# Patient Record
Sex: MALE | Race: WHITE | Employment: OTHER | URBAN - METROPOLITAN AREA
[De-identification: names, ages, dates, MRNs, and addresses within clinical notes are randomized per-mention and may not be internally consistent; named-entity substitution may affect disease eponyms.]

---

## 2017-02-06 NOTE — TELEPHONE ENCOUNTER
LOV 7/8/16. LOV for anxiety was 7/13/15. No future appointments. Refill request for Citalopram.Depression/anxiety have not been addressed since 7/2015. Routed to Dr Kevin Romeo.

## 2017-03-01 ENCOUNTER — TELEPHONE (OUTPATIENT)
Dept: FAMILY MEDICINE CLINIC | Facility: CLINIC | Age: 57
End: 2017-03-01

## 2017-03-01 NOTE — TELEPHONE ENCOUNTER
Pt had a scheduled appt for 3/2/17 w/Dr Roney Odell. ..he thought it was for today 3/1/17 at 8:45. I did offer other times for him but he DECLINED. Pt was upset and stated that he will probably go to another office. Appt was cancelled per pt

## 2017-03-01 NOTE — TELEPHONE ENCOUNTER
LOV 7/8/16 was acute. LOV for depression was 7/13/15. No future appointments. Pt states that he received an automated call yesterday stating that he had appointment today. When he came for appt, he was told that his appt is actually tomorrow.  Pt was offe

## 2017-03-07 NOTE — PROGRESS NOTES
Milly Torres is a 64year old male. Patient presents with:  Establish Care: per pt   Medication Request      HPI:   Pt is switching offices, as this is much closer and easier to get to for him. Needs a refill of citalopram. Takes 40 mg per day.  Has b Yes           0.0 oz/week       0 Standard drinks or equivalent per week       Comment: special occ - weddings        BP Readings from Last 6 Encounters:  03/07/17 : 110/68  07/08/16 : 128/76  06/30/16 : 138/87  02/02/16 : 120/80  12/30/15 : 132/78  08/05/ Take 1 tablet (40 mg total) by mouth daily. The patient indicates understanding of these issues and agrees to the plan.

## 2017-05-11 PROCEDURE — 80053 COMPREHEN METABOLIC PANEL: CPT | Performed by: FAMILY MEDICINE

## 2017-05-11 PROCEDURE — 80061 LIPID PANEL: CPT | Performed by: FAMILY MEDICINE

## 2017-05-11 PROCEDURE — 83036 HEMOGLOBIN GLYCOSYLATED A1C: CPT | Performed by: FAMILY MEDICINE

## 2017-05-11 NOTE — PROGRESS NOTES
Helen Yang is a 64year old male who presents for a complete physical exam.   HPI:   Pt complains of nothing today, other than achy joints from arthritis. This gets worse with the weather, especially rain.      Depression: on citalopram. He is doing ok bronchitis    • Cellulitis    • Conjunctivitis    • Tinea pedis    • Vertigo    • Preoperative examination, unspecified    • Pre-operative cardiovascular examination    • Visual impairment      glassses   • Bowel obstruction [facility]) March 2015   • Depression arthritis as above.    NEURO: denies headaches that are new or different, numbness all over from previous spinal injury, not getting worse or changing   PSYCHE: + depression as above, no anxiety, no SI or HI.   HEMATOLOGIC: denies hx of anemia  ENDOCRINE: d Orders Placed This Encounter  Lipid Panel [E]  Comp Metabolic Panel (14) [E]  Hemoglobin A1C [E]  *Venipuncture    Meds & Refills for this Visit:  Signed Prescriptions Disp Refills    Citalopram Hydrobromide 40 MG Oral Tab 90 tablet 1      Sig: Take 1

## 2017-05-15 ENCOUNTER — TELEPHONE (OUTPATIENT)
Dept: FAMILY MEDICINE CLINIC | Facility: CLINIC | Age: 57
End: 2017-05-15

## 2017-05-15 NOTE — TELEPHONE ENCOUNTER
Yes, we took care of everything. You can cancel the physical. He needs to schedule for his colonoscopy consult with Dr. Ulysees Kayser, however (just a reminder). Thanks.

## 2017-06-14 ENCOUNTER — OFFICE VISIT (OUTPATIENT)
Dept: FAMILY MEDICINE CLINIC | Facility: CLINIC | Age: 57
End: 2017-06-14

## 2017-06-14 VITALS
RESPIRATION RATE: 18 BRPM | DIASTOLIC BLOOD PRESSURE: 60 MMHG | SYSTOLIC BLOOD PRESSURE: 132 MMHG | HEART RATE: 78 BPM | OXYGEN SATURATION: 98 % | TEMPERATURE: 99 F

## 2017-06-14 DIAGNOSIS — J02.9 PHARYNGITIS, UNSPECIFIED ETIOLOGY: Primary | ICD-10-CM

## 2017-06-14 DIAGNOSIS — J06.9 UPPER RESPIRATORY INFECTION WITH COUGH AND CONGESTION: ICD-10-CM

## 2017-06-14 PROCEDURE — 87880 STREP A ASSAY W/OPTIC: CPT | Performed by: NURSE PRACTITIONER

## 2017-06-14 PROCEDURE — 87081 CULTURE SCREEN ONLY: CPT | Performed by: NURSE PRACTITIONER

## 2017-06-14 PROCEDURE — 99213 OFFICE O/P EST LOW 20 MIN: CPT | Performed by: NURSE PRACTITIONER

## 2017-06-14 NOTE — PROGRESS NOTES
CHIEF COMPLAINT:   Patient presents with:  Nasal Congestion  Sore Throat  Fever        HPI:   Malina Nielsen is a 64year old male presents to clinic with complaint of sore throat. Patient has had for 2 days. Symptoms have been contsant since onset.   Pa NOSE: nostrils patent, clear nasal discharge, nasal mucosa mildly erythematous  THROAT: oral mucosa pink, moist. Posterior pharynx erythematous and injected. No exudates. Tonsils 0/4.   Breath is not malodorous   NECK: supple  LUNGS: clear to auscultation b 6. Follow up with PMD in 3-4 days for reeval. Follow up sooner or go to the emergency department immediately if symptoms worsen, change, or if you have any concerns.       Self-Care for Sore Throats  Sore throats happen for many reasons, such as colds, mika · When you’re around someone with a sore throat or cold, wash your hands often to keep viruses or bacteria from spreading. · Don’t strain your vocal cords.   Call your healthcare provider  Contact your healthcare provider if you have:  · A temperature over · Gargle every 2 hours with 1/4 teaspoon of salt dissolved in 1/2 cup of warm water. Suck on throat lozenges and cough drops to moisten your throat. · Cough medicines are available but it is unclear how effective they actually are.   · Take acetaminophen o

## 2017-06-26 ENCOUNTER — HOSPITAL ENCOUNTER (OUTPATIENT)
Dept: GENERAL RADIOLOGY | Age: 57
Discharge: HOME | End: 2017-06-26
Attending: FAMILY MEDICINE
Payer: COMMERCIAL

## 2017-06-26 ENCOUNTER — OFFICE VISIT (OUTPATIENT)
Dept: FAMILY MEDICINE CLINIC | Facility: CLINIC | Age: 57
End: 2017-06-26

## 2017-06-26 VITALS
TEMPERATURE: 97 F | RESPIRATION RATE: 16 BRPM | SYSTOLIC BLOOD PRESSURE: 122 MMHG | HEIGHT: 70 IN | DIASTOLIC BLOOD PRESSURE: 78 MMHG | WEIGHT: 227.63 LBS | HEART RATE: 76 BPM | BODY MASS INDEX: 32.59 KG/M2

## 2017-06-26 DIAGNOSIS — M17.11 ARTHRITIS OF RIGHT KNEE: ICD-10-CM

## 2017-06-26 DIAGNOSIS — M25.561 ACUTE PAIN OF RIGHT KNEE: Primary | ICD-10-CM

## 2017-06-26 DIAGNOSIS — M25.561 ACUTE PAIN OF RIGHT KNEE: ICD-10-CM

## 2017-06-26 PROCEDURE — 99214 OFFICE O/P EST MOD 30 MIN: CPT | Performed by: FAMILY MEDICINE

## 2017-06-26 PROCEDURE — 73562 X-RAY EXAM OF KNEE 3: CPT | Performed by: FAMILY MEDICINE

## 2017-06-26 RX ORDER — MELOXICAM 15 MG/1
15 TABLET ORAL DAILY
Qty: 30 TABLET | Refills: 0 | Status: SHIPPED | OUTPATIENT
Start: 2017-06-26 | End: 2017-07-23

## 2017-06-26 NOTE — PROGRESS NOTES
Sandra Ortiz is a 64year old male.     CC:  Patient presents with:  Musculoskeletal Problem: per pt, right knee issues      HPI:  The patient has noted musculoskeletal pain:  Location: right  knee  Duration: 2 week(s)  Injury: none recently, he did ha UNLISTED      Comment: cervical vertebral fusion  1999: VASECTOMY   Family History   Problem Relation Age of Onset   • Cancer Mother      brain   • Cancer Paternal Grandfather      brain   • Other[other] [OTHER] Son      POTS, cyclical vomiting    • Psychi knee exam:  Full ROM  No effusion or tenderness. NEURO: not examined     ASSESSMENT AND PLAN    1. Acute pain of right knee  Await xray  Start Mobic  - XR KNEE (1 OR 2 VIEWS), RIGHT (CPT=73560); Future    2.  Arthritis of right knee  As above  - XR KNEE (

## 2017-07-24 RX ORDER — MELOXICAM 15 MG/1
TABLET ORAL
Qty: 30 TABLET | Refills: 0 | Status: SHIPPED | OUTPATIENT
Start: 2017-07-24 | End: 2017-08-27

## 2017-08-28 RX ORDER — MELOXICAM 15 MG/1
TABLET ORAL
Qty: 30 TABLET | Refills: 0 | Status: SHIPPED | OUTPATIENT
Start: 2017-08-28 | End: 2017-09-26

## 2017-09-26 RX ORDER — MELOXICAM 15 MG/1
TABLET ORAL
Qty: 30 TABLET | Refills: 0 | Status: SHIPPED | OUTPATIENT
Start: 2017-09-26 | End: 2017-10-28

## 2017-09-26 NOTE — TELEPHONE ENCOUNTER
Script sent. I see he had a knee injection today, if pain gets better after that, he should stop the mobic.

## 2017-10-20 NOTE — TELEPHONE ENCOUNTER
Patient states the injection helped with the swelling but did not help with the pain.   Has f/u with orth 10/24/17

## 2017-10-28 RX ORDER — MELOXICAM 15 MG/1
TABLET ORAL
Qty: 30 TABLET | Refills: 0 | Status: SHIPPED | OUTPATIENT
Start: 2017-10-28 | End: 2017-11-29

## 2017-10-28 NOTE — TELEPHONE ENCOUNTER
Script sent, but if this is not helping his knee pain, then he should not keep taking it as it will cause other issues with stomach, kidneys and increase risk of heart disease. Looks like he is seeing Dr. Leila Broderick for knee and has MRI pending.

## 2017-10-28 NOTE — TELEPHONE ENCOUNTER
Patient states medication does not help with his knee pain but it does help with he general arthritis pain.   Patient states he thought he was taking meloxicam because it is better for him than aleve and would not cause problems with stomach,kidney or incre

## 2017-11-07 ENCOUNTER — TELEPHONE (OUTPATIENT)
Dept: FAMILY MEDICINE CLINIC | Facility: CLINIC | Age: 57
End: 2017-11-07

## 2017-11-07 ENCOUNTER — OFFICE VISIT (OUTPATIENT)
Dept: FAMILY MEDICINE CLINIC | Facility: CLINIC | Age: 57
End: 2017-11-07

## 2017-11-07 VITALS
BODY MASS INDEX: 32.73 KG/M2 | HEART RATE: 70 BPM | WEIGHT: 228.63 LBS | DIASTOLIC BLOOD PRESSURE: 70 MMHG | TEMPERATURE: 98 F | RESPIRATION RATE: 14 BRPM | SYSTOLIC BLOOD PRESSURE: 120 MMHG | HEIGHT: 70 IN

## 2017-11-07 DIAGNOSIS — K08.9 POOR DENTITION: ICD-10-CM

## 2017-11-07 DIAGNOSIS — F32.1 MODERATE SINGLE CURRENT EPISODE OF MAJOR DEPRESSIVE DISORDER (HCC): ICD-10-CM

## 2017-11-07 DIAGNOSIS — G47.63 BRUXISM, SLEEP-RELATED: ICD-10-CM

## 2017-11-07 DIAGNOSIS — Z82.61 FAMILY HISTORY OF RHEUMATOID ARTHRITIS: ICD-10-CM

## 2017-11-07 DIAGNOSIS — M20.002 ACQUIRED BILATERAL FINGER DEFORMITY: Primary | ICD-10-CM

## 2017-11-07 DIAGNOSIS — M20.001 ACQUIRED BILATERAL FINGER DEFORMITY: Primary | ICD-10-CM

## 2017-11-07 DIAGNOSIS — R25.1 TREMOR OF BOTH HANDS: ICD-10-CM

## 2017-11-07 PROCEDURE — 99214 OFFICE O/P EST MOD 30 MIN: CPT | Performed by: FAMILY MEDICINE

## 2017-11-07 PROCEDURE — 84443 ASSAY THYROID STIM HORMONE: CPT | Performed by: FAMILY MEDICINE

## 2017-11-07 PROCEDURE — 86431 RHEUMATOID FACTOR QUANT: CPT | Performed by: FAMILY MEDICINE

## 2017-11-07 PROCEDURE — 82607 VITAMIN B-12: CPT | Performed by: FAMILY MEDICINE

## 2017-11-07 PROCEDURE — 86140 C-REACTIVE PROTEIN: CPT | Performed by: FAMILY MEDICINE

## 2017-11-07 PROCEDURE — 84550 ASSAY OF BLOOD/URIC ACID: CPT | Performed by: FAMILY MEDICINE

## 2017-11-07 PROCEDURE — 36415 COLL VENOUS BLD VENIPUNCTURE: CPT | Performed by: FAMILY MEDICINE

## 2017-11-07 PROCEDURE — 85652 RBC SED RATE AUTOMATED: CPT | Performed by: FAMILY MEDICINE

## 2017-11-07 PROCEDURE — 82746 ASSAY OF FOLIC ACID SERUM: CPT | Performed by: FAMILY MEDICINE

## 2017-11-07 PROCEDURE — 86780 TREPONEMA PALLIDUM: CPT | Performed by: FAMILY MEDICINE

## 2017-11-07 PROCEDURE — 86038 ANTINUCLEAR ANTIBODIES: CPT | Performed by: FAMILY MEDICINE

## 2017-11-07 RX ORDER — BUPROPION HYDROCHLORIDE 150 MG/1
150 TABLET ORAL DAILY
Qty: 30 TABLET | Refills: 1 | Status: SHIPPED | OUTPATIENT
Start: 2017-11-07 | End: 2017-12-12

## 2017-11-07 NOTE — PROGRESS NOTES
Dorian Galvan is a 64year old male. Patient presents with:  Arthritis      HPI:   Arthritis: taking meloxicam every night. Not helping much.  Saw ortho and recommended knee replacement, but he needs to get his teeth taken care of first. He has severe br Rfl: 1      Past Medical History:   Diagnosis Date   • Acute bronchitis    • Bowel obstruction March 2015   • Cellulitis    • Conjunctivitis    • Depression    • Pre-operative cardiovascular examination    • Preoperative examination, unspecified    • Tinea frustrations     ASSESSMENT AND PLAN:     Acquired bilateral finger deformity  (primary encounter diagnosis)  Family history of rheumatoid arthritis  Tremor of both hands  Moderate single current episode of major depressive disorder (hcc)  Poor dentition Visit:  Signed Prescriptions Disp Refills    BuPROPion HCl ER, XL, 150 MG Oral Tablet 24 Hr 30 tablet 1      Sig: Take 1 tablet (150 mg total) by mouth daily. The patient indicates understanding of these issues and agrees to the plan.

## 2017-11-07 NOTE — PATIENT INSTRUCTIONS
[id number]  [facility] , [address] [place] [address]  Appointments and information: [phone]  [email or web address]

## 2017-11-07 NOTE — TELEPHONE ENCOUNTER
Spoke with pharmacist at [facility] who states wellbutrin may increase the serum concentration of citalopram

## 2017-11-16 DIAGNOSIS — F32.9 MAJOR DEPRESSIVE DISORDER WITH SINGLE EPISODE, REMISSION STATUS UNSPECIFIED: ICD-10-CM

## 2017-11-16 RX ORDER — CITALOPRAM 40 MG/1
TABLET ORAL
Qty: 90 TABLET | Refills: 1 | Status: SHIPPED | OUTPATIENT
Start: 2017-11-16 | End: 2018-05-27

## 2017-11-29 RX ORDER — MELOXICAM 15 MG/1
TABLET ORAL
Qty: 30 TABLET | Refills: 5 | Status: SHIPPED | OUTPATIENT
Start: 2017-11-29 | End: 2018-04-01 | Stop reason: ALTCHOICE

## 2017-12-12 ENCOUNTER — HOSPITAL ENCOUNTER (OUTPATIENT)
Dept: GENERAL RADIOLOGY | Age: 57
Discharge: HOME | End: 2017-12-12
Attending: FAMILY MEDICINE
Payer: COMMERCIAL

## 2017-12-12 ENCOUNTER — OFFICE VISIT (OUTPATIENT)
Dept: FAMILY MEDICINE CLINIC | Facility: CLINIC | Age: 57
End: 2017-12-12

## 2017-12-12 VITALS
SYSTOLIC BLOOD PRESSURE: 130 MMHG | DIASTOLIC BLOOD PRESSURE: 82 MMHG | OXYGEN SATURATION: 98 % | BODY MASS INDEX: 32 KG/M2 | WEIGHT: 225 LBS | RESPIRATION RATE: 14 BRPM | TEMPERATURE: 98 F | HEART RATE: 79 BPM

## 2017-12-12 DIAGNOSIS — M54.2 NECK PAIN OF OVER 3 MONTHS DURATION: ICD-10-CM

## 2017-12-12 DIAGNOSIS — F32.1 MODERATE SINGLE CURRENT EPISODE OF MAJOR DEPRESSIVE DISORDER (HCC): ICD-10-CM

## 2017-12-12 DIAGNOSIS — M25.512 ACUTE PAIN OF LEFT SHOULDER: Primary | ICD-10-CM

## 2017-12-12 DIAGNOSIS — M25.512 ACUTE PAIN OF LEFT SHOULDER: ICD-10-CM

## 2017-12-12 DIAGNOSIS — M24.012 LOOSE BODY IN SHOULDER JOINT, LEFT: ICD-10-CM

## 2017-12-12 PROCEDURE — 73030 X-RAY EXAM OF SHOULDER: CPT | Performed by: FAMILY MEDICINE

## 2017-12-12 PROCEDURE — 99214 OFFICE O/P EST MOD 30 MIN: CPT | Performed by: FAMILY MEDICINE

## 2017-12-12 RX ORDER — GABAPENTIN 100 MG/1
100 CAPSULE ORAL 2 TIMES DAILY
Qty: 60 CAPSULE | Refills: 0 | Status: SHIPPED | OUTPATIENT
Start: 2017-12-12 | End: 2018-01-26 | Stop reason: DRUGHIGH

## 2017-12-12 RX ORDER — BUPROPION HYDROCHLORIDE 300 MG/1
300 TABLET ORAL DAILY
Qty: 30 TABLET | Refills: 1 | Status: SHIPPED | OUTPATIENT
Start: 2017-12-12 | End: 2018-01-23 | Stop reason: DRUGHIGH

## 2017-12-12 NOTE — PROGRESS NOTES
Robina Vargas is a 64year old male. Patient presents with:  Shoulder Pain: Left shoulder-thinks tore rotator cuff  Neck Pain      HPI:   Left shoulder pain x 2 weeks, but bothering him more x  2 days.  Right now it is a dull ache, burns when he lays on - weddings        BP Readings from Last 6 Encounters:  12/12/17 : 130/82  11/07/17 : 120/70  06/26/17 : 122/78  06/14/17 : 132/60  05/11/17 : 124/80  03/07/17 : 110/68      Wt Readings from Last 6 Encounters:  12/12/17 : 225 lb  11/07/17 : 228 lb 9.6 oz  0 (CPT=73030); Future  -     gabapentin 100 MG Oral Cap; Take 1 capsule (100 mg total) by mouth 2 (two) times daily.  Start nightly, increase to BID after 2-3 days if tolerated  -     OP REFERRAL TO [facility]  -     ORTHOPEDIC - INTERNAL

## 2017-12-26 ENCOUNTER — OFFICE VISIT (OUTPATIENT)
Dept: FAMILY MEDICINE CLINIC | Facility: CLINIC | Age: 57
End: 2017-12-26

## 2017-12-26 VITALS
WEIGHT: 220 LBS | OXYGEN SATURATION: 97 % | SYSTOLIC BLOOD PRESSURE: 126 MMHG | BODY MASS INDEX: 32 KG/M2 | TEMPERATURE: 99 F | RESPIRATION RATE: 14 BRPM | DIASTOLIC BLOOD PRESSURE: 80 MMHG | HEART RATE: 71 BPM

## 2017-12-26 DIAGNOSIS — H65.01 RIGHT ACUTE SEROUS OTITIS MEDIA, RECURRENCE NOT SPECIFIED: Primary | ICD-10-CM

## 2017-12-26 DIAGNOSIS — R10.11 RUQ ABDOMINAL PAIN: ICD-10-CM

## 2017-12-26 DIAGNOSIS — K29.50 CHRONIC GASTRITIS WITHOUT BLEEDING, UNSPECIFIED GASTRITIS TYPE: ICD-10-CM

## 2017-12-26 PROCEDURE — 99214 OFFICE O/P EST MOD 30 MIN: CPT | Performed by: FAMILY MEDICINE

## 2017-12-26 RX ORDER — OMEPRAZOLE 40 MG/1
40 CAPSULE, DELAYED RELEASE ORAL DAILY
Qty: 30 CAPSULE | Refills: 1 | Status: SHIPPED | OUTPATIENT
Start: 2017-12-26 | End: 2018-05-17 | Stop reason: ALTCHOICE

## 2017-12-26 RX ORDER — AMOXICILLIN 500 MG/1
500 CAPSULE ORAL 3 TIMES DAILY
Qty: 30 CAPSULE | Refills: 0 | Status: SHIPPED | OUTPATIENT
Start: 2017-12-26 | End: 2018-01-05

## 2017-12-26 NOTE — PROGRESS NOTES
Ana Valdovinos is a 62year old male. Patient presents with: Follow - Up: RA  Ear Pain  Stomach Pain: ulcer? HPI:   Quinten Alva off a ladder last Thursday from a three step ladder, landed on the back side of him right hip. No pap or snap.  Was able to get DAILY Disp: 30 tablet Rfl: 5   CITALOPRAM HYDROBROMIDE 40 MG Oral Tab TAKE 1 TABLET BY MOUTH DAILY Disp: 90 tablet Rfl: 1      Past Medical History:   Diagnosis Date   • Acute bronchitis    • Bowel obstruction March 2015   • Cellulitis    • Conjunctivitis in RUQ, negative rod's sign, lesser tenderness in RLQ. EXTREMITIES: no edema  Musc: no bony tenderness of hips, no pain with hip and knee flexion and internal/external rotation, tenderness more in right buttocks.      ASSESSMENT AND PLAN:     Right acu

## 2017-12-28 ENCOUNTER — TELEPHONE (OUTPATIENT)
Dept: FAMILY MEDICINE CLINIC | Facility: CLINIC | Age: 57
End: 2017-12-28

## 2017-12-28 NOTE — TELEPHONE ENCOUNTER
Spoke with Ryan Hernandez at [facility] who states PPIs need PA    Spoke with Tisha Martin in [place] department who will fax form to our office.

## 2018-01-05 NOTE — TELEPHONE ENCOUNTER
[facility]. Per Dr Shannon Griffiths patient can take OTC omeprazole. Patient notified. States he already picked up OTC med.   Advised patient to let office know how his symptoms are in 2 weeks

## 2018-01-09 ENCOUNTER — TELEPHONE (OUTPATIENT)
Dept: FAMILY MEDICINE CLINIC | Facility: CLINIC | Age: 58
End: 2018-01-09

## 2018-01-09 ENCOUNTER — HOSPITAL ENCOUNTER (OUTPATIENT)
Dept: GENERAL RADIOLOGY | Age: 58
Discharge: HOME | End: 2018-01-09
Attending: INTERNAL MEDICINE
Payer: COMMERCIAL

## 2018-01-09 DIAGNOSIS — M79.641 BILATERAL HAND PAIN: ICD-10-CM

## 2018-01-09 DIAGNOSIS — M79.642 BILATERAL HAND PAIN: ICD-10-CM

## 2018-01-09 DIAGNOSIS — R76.8 RHEUMATOID FACTOR POSITIVE: ICD-10-CM

## 2018-01-09 DIAGNOSIS — M79.643 PAIN OF HAND, UNSPECIFIED LATERALITY: Primary | ICD-10-CM

## 2018-01-09 PROCEDURE — 86803 HEPATITIS C AB TEST: CPT | Performed by: INTERNAL MEDICINE

## 2018-01-09 PROCEDURE — 73130 X-RAY EXAM OF HAND: CPT | Performed by: INTERNAL MEDICINE

## 2018-01-09 PROCEDURE — 86235 NUCLEAR ANTIGEN ANTIBODY: CPT | Performed by: INTERNAL MEDICINE

## 2018-01-09 NOTE — TELEPHONE ENCOUNTER
Patient seen the Rheumatologist, Dr Ellen Solares today and has a follow up appt on Feb 5, 2018. A referral was not done. Can Dr Rosemary Ardon please put a referral in to cover today's visit and the follow up visit in February.  Please call patient when Referral is done

## 2018-01-11 ENCOUNTER — TELEPHONE (OUTPATIENT)
Dept: FAMILY MEDICINE CLINIC | Facility: CLINIC | Age: 58
End: 2018-01-11

## 2018-01-11 DIAGNOSIS — Z79.1 NSAID LONG-TERM USE: Primary | ICD-10-CM

## 2018-01-11 NOTE — TELEPHONE ENCOUNTER
Patient notified and verbalized understanding. States he is fine with continuing and monitoring the enzymes. Per Dr Harley Ramos, recheck CMP in 2 months. Patient notified and verbalized understanding.   Future lab ordered, recall in epic

## 2018-01-11 NOTE — TELEPHONE ENCOUNTER
Most antiinflammatories can cause elevations in liver and kidney enzymes. If we avoid those, then we are left with either narcotics, or antidepressants and nerve pain meds.  I don't like to start narcotics for chronic pain because they are not good to take

## 2018-01-11 NOTE — TELEPHONE ENCOUNTER
Rheumatologist did lab work on patient, they called him and said his liver enzymes are slightly elevated and they want him to discontinue taking the meloxicam. He is wondering if there is anything he can take in place of the meloxicam. Please call back.

## 2018-01-23 ENCOUNTER — TELEPHONE (OUTPATIENT)
Dept: FAMILY MEDICINE CLINIC | Facility: CLINIC | Age: 58
End: 2018-01-23

## 2018-01-23 RX ORDER — BUPROPION HYDROCHLORIDE 450 MG/1
1 TABLET, FILM COATED, EXTENDED RELEASE ORAL DAILY
Qty: 30 TABLET | Refills: 1 | Status: SHIPPED | OUTPATIENT
Start: 2018-01-23 | End: 2018-02-22

## 2018-01-23 NOTE — TELEPHONE ENCOUNTER
Last refilled on 12/12/17 for # 30 with 1 rf. Last seen on 12/26/17. Future Appointments  Date Time Provider Alok Alleni   2/5/2018 10:00 AM Moisés Chavez MD [facility]      The last time he was seen it was increased.  (12/12/17 increas

## 2018-01-23 NOTE — TELEPHONE ENCOUNTER
PT CALLED AND ADV THAT HE IS OUT OF MEDS:    BuPROPion HCl ER, XL, 300 MG Oral Tablet 24 Hr    PT DID NOT KNOW IF DR WANTED PT TO CONTINUE ON MEDS OR TRY SOMETHING DIFFERENT    IF DR WANTS PT TO CONTINUE PLEASE SEND REFILL TO     [facility] &

## 2018-01-23 NOTE — TELEPHONE ENCOUNTER
Was he \"cranky\" before he stopped the medication, or since then? I can bump it up to 450 mg po qd if he did not notice much of a difference. Otherwise, lets get him back on the 300 mg dose. Let me know.

## 2018-01-26 ENCOUNTER — TELEPHONE (OUTPATIENT)
Dept: FAMILY MEDICINE CLINIC | Facility: CLINIC | Age: 58
End: 2018-01-26

## 2018-01-26 RX ORDER — GABAPENTIN 300 MG/1
300 CAPSULE ORAL 3 TIMES DAILY
Qty: 90 CAPSULE | Refills: 0 | Status: SHIPPED | OUTPATIENT
Start: 2018-01-26 | End: 2018-02-25

## 2018-01-26 NOTE — TELEPHONE ENCOUNTER
Patient is out of gabapentin not sure if he is suppose to keep taking it? If he is then we need to call in a refill to [facility] on [address]. Please call patient back.

## 2018-01-26 NOTE — TELEPHONE ENCOUNTER
Patient notified and verbalized understanding. States he would like to try increase dose. Per KE, will send script for TID but start at BID. If BID works keep at that dose.  If it makes him too sleepy let office know    Patient notified and verbalized

## 2018-01-26 NOTE — TELEPHONE ENCOUNTER
We can either stop it or try a higher dose. You are taking a very low dose of that, 300 mg bid or tid might help more if it doesn't make you too sleepy.

## 2018-01-30 ENCOUNTER — HOSPITAL ENCOUNTER (OUTPATIENT)
Dept: ULTRASOUND IMAGING | Facility: HOSPITAL | Age: 58
Discharge: HOME | End: 2018-01-30
Attending: INTERNAL MEDICINE
Payer: COMMERCIAL

## 2018-01-30 DIAGNOSIS — M79.642 BILATERAL HAND PAIN: ICD-10-CM

## 2018-01-30 DIAGNOSIS — R76.8 RHEUMATOID FACTOR POSITIVE: ICD-10-CM

## 2018-01-30 DIAGNOSIS — M79.641 BILATERAL HAND PAIN: ICD-10-CM

## 2018-01-30 PROCEDURE — 76881 US COMPL JOINT R-T W/IMG: CPT | Performed by: INTERNAL MEDICINE

## 2018-02-01 ENCOUNTER — OFFICE VISIT (OUTPATIENT)
Dept: FAMILY MEDICINE CLINIC | Facility: CLINIC | Age: 58
End: 2018-02-01

## 2018-02-01 VITALS
HEART RATE: 72 BPM | WEIGHT: 225 LBS | RESPIRATION RATE: 16 BRPM | BODY MASS INDEX: 31 KG/M2 | SYSTOLIC BLOOD PRESSURE: 138 MMHG | DIASTOLIC BLOOD PRESSURE: 90 MMHG | TEMPERATURE: 97 F

## 2018-02-01 DIAGNOSIS — T50.905A ADVERSE EFFECT OF DRUG, INITIAL ENCOUNTER: ICD-10-CM

## 2018-02-01 DIAGNOSIS — R42 VERTIGO: Primary | ICD-10-CM

## 2018-02-01 PROCEDURE — 99214 OFFICE O/P EST MOD 30 MIN: CPT | Performed by: FAMILY MEDICINE

## 2018-02-01 RX ORDER — MECLIZINE HYDROCHLORIDE 25 MG/1
25 TABLET ORAL 3 TIMES DAILY PRN
Qty: 15 TABLET | Refills: 0 | Status: SHIPPED | OUTPATIENT
Start: 2018-02-01 | End: 2018-05-17 | Stop reason: ALTCHOICE

## 2018-02-01 NOTE — PROGRESS NOTES
Guadalupe Hardin is a 62year old male. Patient presents with:  Vertigo: per pt      HPI:   Had one episode of vertigo 3 days ago.  2nd night not as bad, didn't take it last night because he has to get up in the AM. Takes it at 10 pm.   Started gabapentin M special occ - weddings        BP Readings from Last 6 Encounters:  02/01/18 : 138/90  01/09/18 : 120/80  12/26/17 : 126/80  12/12/17 : 130/82  11/07/17 : 120/70  06/26/17 : 122/78      Wt Readings from Last 6 Encounters:  02/01/18 : 225 lb  01/09/18 : 224 Prescriptions Disp Refills    Meclizine HCl 25 MG Oral Tab 15 tablet 0      Sig: Take 1 tablet (25 mg total) by mouth 3 (three) times daily as needed for Dizziness. The patient indicates understanding of these issues and agrees to the plan.

## 2018-02-05 PROBLEM — R76.8 RHEUMATOID FACTOR POSITIVE: Status: ACTIVE | Noted: 2018-02-05

## 2018-02-15 ENCOUNTER — TELEPHONE (OUTPATIENT)
Dept: FAMILY MEDICINE CLINIC | Facility: CLINIC | Age: 58
End: 2018-02-15

## 2018-02-15 NOTE — TELEPHONE ENCOUNTER
Pt had a tooth pulled and is taking Hydrocodon at night. Still awake at 3:00 am. Does KE think it is interaction?

## 2018-02-15 NOTE — TELEPHONE ENCOUNTER
Patient states he is taking hydrocodone for pain after tooth being pulled. Is also on gabapentin, citalopram and wellbutrin which he always takes. Patient states he is not taking prednisone every day as he forgets.  If he remembers it he takes in the [employer]

## 2018-02-15 NOTE — TELEPHONE ENCOUNTER
Try stopping the norco (which is the same as hydrocodone) and see if that helps. Hopefully the tooth pain should be getting better by now. If you need it, don't take it so close to bed time.

## 2018-02-26 PROBLEM — M15.0 PRIMARY OSTEOARTHRITIS INVOLVING MULTIPLE JOINTS: Status: ACTIVE | Noted: 2018-02-26

## 2018-02-26 PROBLEM — M15.9 PRIMARY OSTEOARTHRITIS INVOLVING MULTIPLE JOINTS: Status: ACTIVE | Noted: 2018-02-26

## 2018-02-26 RX ORDER — GABAPENTIN 300 MG/1
CAPSULE ORAL
Qty: 90 CAPSULE | Refills: 0 | Status: SHIPPED | OUTPATIENT
Start: 2018-02-26 | End: 2018-05-17 | Stop reason: ALTCHOICE

## 2018-02-26 NOTE — TELEPHONE ENCOUNTER
LOV:  2/1/18  Last Refill:  1/26/18  #90 0 RF    Future Appointments  Date Time Provider Alok Darling   2/26/2018 10:20 AM Reshma Scott MD [facility]

## 2018-03-14 ENCOUNTER — TELEPHONE (OUTPATIENT)
Dept: FAMILY MEDICINE CLINIC | Facility: CLINIC | Age: 58
End: 2018-03-14

## 2018-03-27 PROCEDURE — 86704 HEP B CORE ANTIBODY TOTAL: CPT | Performed by: INTERNAL MEDICINE

## 2018-03-27 PROCEDURE — 86256 FLUORESCENT ANTIBODY TITER: CPT | Performed by: INTERNAL MEDICINE

## 2018-03-27 PROCEDURE — 83516 IMMUNOASSAY NONANTIBODY: CPT | Performed by: INTERNAL MEDICINE

## 2018-03-27 PROCEDURE — 86706 HEP B SURFACE ANTIBODY: CPT | Performed by: INTERNAL MEDICINE

## 2018-03-27 PROCEDURE — 87340 HEPATITIS B SURFACE AG IA: CPT | Performed by: INTERNAL MEDICINE

## 2018-04-16 NOTE — PROGRESS NOTES
Sandra Ortiz is a 62year old male. Patient presents with:  Depression      HPI:   Depression: not feeling more depressed. More easily angered. More irritable. His wife is not happy with how he is doing.  She tends to make him more angry and set him off tobacco: Never Used                      Alcohol use:  No               Comment: special occ - weddings        BP Readings from Last 6 Encounters:  04/16/18 : 134/82  03/27/18 : 122/82  02/26/18 : 132/72  02/05/18 : 120/80  02/01/18 : 138/90  01/09/18 : 120 in this encounter. Meds & Refills for this Visit:  Signed Prescriptions Disp Refills    BuPROPion HCl ER, XL, 300 MG Oral Tablet 24 Hr 30 tablet 0      Sig: Take 1 tablet (300 mg total) by mouth daily.                  The patient indicates und

## 2018-05-17 ENCOUNTER — OFFICE VISIT (OUTPATIENT)
Dept: FAMILY MEDICINE CLINIC | Facility: CLINIC | Age: 58
End: 2018-05-17

## 2018-05-17 VITALS
TEMPERATURE: 99 F | SYSTOLIC BLOOD PRESSURE: 120 MMHG | BODY MASS INDEX: 31 KG/M2 | HEART RATE: 84 BPM | RESPIRATION RATE: 16 BRPM | WEIGHT: 221.38 LBS | DIASTOLIC BLOOD PRESSURE: 76 MMHG

## 2018-05-17 DIAGNOSIS — F32.1 CURRENT MODERATE EPISODE OF MAJOR DEPRESSIVE DISORDER WITHOUT PRIOR EPISODE (HCC): ICD-10-CM

## 2018-05-17 DIAGNOSIS — M25.561 CHRONIC PAIN OF RIGHT KNEE: ICD-10-CM

## 2018-05-17 DIAGNOSIS — M15.9 PRIMARY OSTEOARTHRITIS INVOLVING MULTIPLE JOINTS: Primary | ICD-10-CM

## 2018-05-17 DIAGNOSIS — R76.8 RHEUMATOID FACTOR POSITIVE: ICD-10-CM

## 2018-05-17 DIAGNOSIS — M17.11 PRIMARY OSTEOARTHRITIS OF RIGHT KNEE: ICD-10-CM

## 2018-05-17 DIAGNOSIS — G89.29 CHRONIC PAIN OF RIGHT KNEE: ICD-10-CM

## 2018-05-17 PROCEDURE — 99214 OFFICE O/P EST MOD 30 MIN: CPT | Performed by: FAMILY MEDICINE

## 2018-05-17 RX ORDER — BUPROPION HYDROCHLORIDE 300 MG/1
300 TABLET ORAL DAILY
Qty: 30 TABLET | Refills: 0 | Status: SHIPPED | OUTPATIENT
Start: 2018-05-17 | End: 2018-05-22 | Stop reason: DRUGHIGH

## 2018-05-17 NOTE — PROGRESS NOTES
Aman Su is a 62year old male. Patient presents with: Follow - Up: per pt, follow up on depression  Colon Cancer Screening      HPI:   Depression: not feeling more depressed. He is overall better than last time he was here. PHQ is better.    Esme 226 lb 4.8 oz  03/27/18 : 225 lb  02/26/18 : 219 lb  02/05/18 : 225 lb  02/01/18 : 225 lb      REVIEW OF SYSTEMS:   GENERAL HEALTH: feels well no complaints other than above   SKIN: denies any unusual skin lesions or rashes  RESPIRATORY: denies shortness o covered. No orders of the defined types were placed in this encounter.               Meds & Refills for this Visit:  Signed Prescriptions Disp Refills    BuPROPion HCl ER, XL, 300 MG Oral Tablet 24 Hr 30 tablet 0      Sig: Take 1 tablet (300 mg tota

## 2018-05-21 ENCOUNTER — TELEPHONE (OUTPATIENT)
Dept: FAMILY MEDICINE CLINIC | Facility: CLINIC | Age: 58
End: 2018-05-21

## 2018-05-21 NOTE — TELEPHONE ENCOUNTER
Patient needs script for Bupropion 450 mg.   This was discussed at 5/17/18 OV    Routed to Dr Shannon Griffiths to advise

## 2018-05-22 RX ORDER — BUPROPION HYDROCHLORIDE 450 MG/1
450 TABLET, FILM COATED, EXTENDED RELEASE ORAL DAILY
Qty: 90 TABLET | Refills: 0 | Status: SHIPPED | OUTPATIENT
Start: 2018-05-22 | End: 2018-08-28

## 2018-05-23 ENCOUNTER — TELEPHONE (OUTPATIENT)
Dept: FAMILY MEDICINE CLINIC | Facility: CLINIC | Age: 58
End: 2018-05-23

## 2018-05-23 NOTE — TELEPHONE ENCOUNTER
Pt stopped in and picked up disc and radiology report that was previously requested. Had pt sign prepared med auth form and made bar code from this note and sent to scanning.

## 2018-05-27 DIAGNOSIS — F32.9 MAJOR DEPRESSIVE DISORDER WITH SINGLE EPISODE, REMISSION STATUS UNSPECIFIED: ICD-10-CM

## 2018-05-29 RX ORDER — CITALOPRAM 40 MG/1
TABLET ORAL
Qty: 90 TABLET | Refills: 1 | Status: ON HOLD | OUTPATIENT
Start: 2018-05-29 | End: 2018-09-19

## 2018-06-21 DIAGNOSIS — F32.1 CURRENT MODERATE EPISODE OF MAJOR DEPRESSIVE DISORDER WITHOUT PRIOR EPISODE (HCC): ICD-10-CM

## 2018-06-21 RX ORDER — BUPROPION HYDROCHLORIDE 300 MG/1
TABLET ORAL
Qty: 30 TABLET | Refills: 0 | OUTPATIENT
Start: 2018-06-21

## 2018-06-21 NOTE — TELEPHONE ENCOUNTER
Last refill - Bupropion  mg - 5/22/18 - #90   Last office visit - 5/17/18  Contacted patient. States he has been taking 300 mg. Has not started 450 mg yet. He will start the Bupropion  mg now.

## 2018-07-12 ENCOUNTER — TELEPHONE (OUTPATIENT)
Dept: FAMILY MEDICINE CLINIC | Facility: CLINIC | Age: 58
End: 2018-07-12

## 2018-07-12 NOTE — TELEPHONE ENCOUNTER
Patient walked into office with questions regarding treatment of knee issues. States he initially saw Dr Cynthia Pierce and had a second opinion at [facility]. Per Dr Cynthia Pierce he is not a candidate for knee replacement until he has his teeth issues fixed.  Has had

## 2018-07-12 NOTE — TELEPHONE ENCOUNTER
Patient notified and verbalized understanding. Patient states he has no further questions at this time.

## 2018-07-12 NOTE — TELEPHONE ENCOUNTER
It is not my place to decide what surgical procedure he needs. Generally a scope is a lot easier and much easier recovery than a total replacement. You can bring your MRI in to the surgeon so he has it for his files.  You can also ask him why he is recommen

## 2018-07-17 ENCOUNTER — HOSPITAL ENCOUNTER (OUTPATIENT)
Dept: GENERAL RADIOLOGY | Age: 58
Discharge: HOME | End: 2018-07-17
Attending: ORTHOPAEDIC SURGERY
Payer: COMMERCIAL

## 2018-07-17 DIAGNOSIS — M17.11 PRIMARY OSTEOARTHRITIS OF RIGHT KNEE: ICD-10-CM

## 2018-07-17 PROCEDURE — 73562 X-RAY EXAM OF KNEE 3: CPT | Performed by: ORTHOPAEDIC SURGERY

## 2018-07-17 PROCEDURE — 73564 X-RAY EXAM KNEE 4 OR MORE: CPT | Performed by: ORTHOPAEDIC SURGERY

## 2018-07-19 ENCOUNTER — HOSPITAL ENCOUNTER (OUTPATIENT)
Dept: MRI IMAGING | Facility: HOSPITAL | Age: 58
Discharge: HOME | End: 2018-07-19
Attending: INTERNAL MEDICINE
Payer: COMMERCIAL

## 2018-07-19 DIAGNOSIS — M79.641 RIGHT HAND PAIN: ICD-10-CM

## 2018-07-19 DIAGNOSIS — R76.8 RHEUMATOID FACTOR POSITIVE: ICD-10-CM

## 2018-07-19 PROCEDURE — A9576 INJ PROHANCE MULTIPACK: HCPCS | Performed by: INTERNAL MEDICINE

## 2018-07-19 PROCEDURE — 73220 MRI UPPR EXTREMITY W/O&W/DYE: CPT | Performed by: INTERNAL MEDICINE

## 2018-07-28 ENCOUNTER — TELEPHONE (OUTPATIENT)
Dept: FAMILY MEDICINE CLINIC | Facility: CLINIC | Age: 58
End: 2018-07-28

## 2018-07-28 NOTE — TELEPHONE ENCOUNTER
DOROTHYF 5/22/18 #90    Called the pharmacy and he has 2 refills left on the script, I asked them to get this ready for the pt and they are but the pharmcy doesn't stock this med and they have to order it and they will get it on Monday      Spoke with the p

## 2018-07-28 NOTE — TELEPHONE ENCOUNTER
Patient needs a refill on his FORFIVO  MG. He is out of medication. BuPROPion HCl ER, XL, 450 MG Oral Tablet 24 Hr 90 tablet 0 5/22/2018 6/21/2018   Sig :  Take 450 mg by mouth daily. Route: [facility] [place] [address].

## 2018-08-08 ENCOUNTER — TELEPHONE (OUTPATIENT)
Dept: FAMILY MEDICINE CLINIC | Facility: CLINIC | Age: 58
End: 2018-08-08

## 2018-08-08 ENCOUNTER — HOSPITAL ENCOUNTER (OUTPATIENT)
Age: 58
Discharge: HOME | End: 2018-08-08
Attending: FAMILY MEDICINE
Payer: COMMERCIAL

## 2018-08-08 ENCOUNTER — APPOINTMENT (OUTPATIENT)
Dept: CT IMAGING | Age: 58
End: 2018-08-08
Attending: FAMILY MEDICINE
Payer: COMMERCIAL

## 2018-08-08 VITALS
HEART RATE: 73 BPM | OXYGEN SATURATION: 97 % | BODY MASS INDEX: 30.1 KG/M2 | RESPIRATION RATE: 20 BRPM | WEIGHT: 215 LBS | TEMPERATURE: 98 F | DIASTOLIC BLOOD PRESSURE: 80 MMHG | SYSTOLIC BLOOD PRESSURE: 126 MMHG | HEIGHT: 71 IN

## 2018-08-08 DIAGNOSIS — N20.0 NEPHROLITHIASIS: Primary | ICD-10-CM

## 2018-08-08 DIAGNOSIS — R30.0 DYSURIA: ICD-10-CM

## 2018-08-08 LAB
#LYMPHOCYTE IC: 2.3 X10ˆ3/UL (ref 0.9–3.2)
#MXD IC: 0.9 X10ˆ3/UL (ref 0.1–1)
#NEUTROPHIL IC: 3.7 X10ˆ3/UL (ref 1.3–6.7)
CREAT SERPL-MCNC: 0.8 MG/DL (ref 0.7–1.3)
GLUCOSE BLD-MCNC: 146 MG/DL (ref 70–99)
HCT IC: 38.2 % (ref 37–54)
HGB IC: 13.7 G/DL (ref 13–17)
ISTAT BUN: 15 MG/DL (ref 8–20)
ISTAT CHLORIDE: 107 MMOL/L (ref 101–111)
ISTAT HEMATOCRIT: 37 % (ref 37–53)
ISTAT IONIZED CALCIUM: 0.83 MMOL/L
ISTAT POTASSIUM: 3.9 MMOL/L (ref 3.6–5.1)
ISTAT SODIUM: 138 MMOL/L (ref 136–144)
LYMPHOCYTES NFR BLD AUTO: 33.9 %
MCH IC: 31.4 PG (ref 27–33.2)
MCHC IC: 35.9 G/DL (ref 31–37)
MCV IC: 87.6 FL (ref 80–99)
MIXED CELL %: 12.7 %
NEUTROPHILS NFR BLD AUTO: 53.4 %
POCT BILIRUBIN URINE: NEGATIVE
POCT BLOOD URINE: NEGATIVE
POCT GLUCOSE URINE: NEGATIVE MG/DL
POCT KETONE URINE: NEGATIVE MG/DL
POCT LEUKOCYTE ESTERASE URINE: NEGATIVE
POCT NITRITE URINE: NEGATIVE
POCT PH URINE: 5.5 (ref 5–8)
POCT PROTEIN URINE: 30 MG/DL
POCT SPECIFIC GRAVITY URINE: 1.03
POCT URINE CLARITY: CLEAR
POCT URINE COLOR: YELLOW
POCT UROBILINOGEN URINE: 0.2 MG/DL
RBC IC: 4.36 X10ˆ6/UL (ref 4.3–5.7)
WBC IC: 6.9 X10ˆ3/UL (ref 4–13)

## 2018-08-08 PROCEDURE — 87086 URINE CULTURE/COLONY COUNT: CPT | Performed by: FAMILY MEDICINE

## 2018-08-08 PROCEDURE — 80047 BASIC METABLC PNL IONIZED CA: CPT

## 2018-08-08 PROCEDURE — 99204 OFFICE O/P NEW MOD 45 MIN: CPT

## 2018-08-08 PROCEDURE — 36415 COLL VENOUS BLD VENIPUNCTURE: CPT

## 2018-08-08 PROCEDURE — 81002 URINALYSIS NONAUTO W/O SCOPE: CPT | Performed by: FAMILY MEDICINE

## 2018-08-08 PROCEDURE — 85025 COMPLETE CBC W/AUTO DIFF WBC: CPT | Performed by: FAMILY MEDICINE

## 2018-08-08 PROCEDURE — 99214 OFFICE O/P EST MOD 30 MIN: CPT

## 2018-08-08 PROCEDURE — 74176 CT ABD & PELVIS W/O CONTRAST: CPT | Performed by: FAMILY MEDICINE

## 2018-08-08 RX ORDER — BUPROPION HYDROCHLORIDE 450 MG/1
TABLET, FILM COATED, EXTENDED RELEASE ORAL
COMMUNITY
End: 2018-09-01 | Stop reason: ALTCHOICE

## 2018-08-08 RX ORDER — CIPROFLOXACIN 500 MG/1
500 TABLET, FILM COATED ORAL 2 TIMES DAILY
Qty: 20 TABLET | Refills: 0 | Status: SHIPPED | OUTPATIENT
Start: 2018-08-08 | End: 2018-08-18

## 2018-08-08 NOTE — TELEPHONE ENCOUNTER
I would like him to go to . They can do imaging to look for stones if needed and can check urine for infection. I'm am worried about infection and if he has infection plus stones, that is bad and needs immediate treatment. Please go to . Thanks.

## 2018-08-08 NOTE — ED PROVIDER NOTES
Patient Seen in: [facility] [place]    History   Patient presents with:  Back Pain  Urinary Symptoms (urologic)    Stated Complaint: kidney pain    HPI  63 yo M with hx of multiple kidney stones in the past now with burning on urination and air)    Current:/80   Pulse 73   Temp 98.2 °F (36.8 °C) (Temporal)   Resp 20   Ht 180.3 cm (5' 11\")   Wt 97.5 kg   SpO2 97%   BMI 29.99 kg/m²         Physical Exam      GEN: Not in any acute distress, making good conversation, answering appropriatel ROUTINE (3 VIEWS), LEFT (CPT=73562)  TECHNIQUE:  Three views were obtained including patellar view. COMPARISON:  Toni Balderas KNEE, COMPLETE (4 OR MORE VIEWS), RIGHT (GPT=17251), 7/17/2018, 11:30.   INDICATIONS:  Pain  PATIENT STATED HISTORY: (As transcri abnormal immunological findings in serum M79.641 Pain in right hand  TECHNIQUE:  A complete multi-planar MRI of the hand was performed with and without intravenous gadolinium contrast.   PATIENT STATED HISTORY:(As transcribed by Technologist)  Patient comp (KUB) (1 AP VIEW)  (CPT=74000), 12/30/2015, 16:35. INDICATIONS:  kidney pain  TECHNIQUE:  Unenhanced multislice CT scanning from above the kidneys to below the urinary bladder.   2D rendering are generated on the CT scanner workstation to localize potentia Aniya Faith MD on 8/08/2018 at 15:56     Approved by: Aniya Faith MD          Reviewed CT RESULTS with the patient. PRESUMPTIVE coverage for UTI - plan of care as below. Patient verbalized understanding and agreed with the plan.      Patient

## 2018-08-08 NOTE — TELEPHONE ENCOUNTER
PT CALLED AND ADV THAT HE THINKS HE HAS KIDNEY STONES. PT ADV LOTS OF PAIN WHEN URINATING. NO BLEEDING, LOTS OF PAIN NOW TRAVELING AROUND TO BACK. PT WONDERING IF HE CAN GET SOMETHING FOR PAIN OR WOULD DR LIKE TO SEE HIM.     PHARMACY Yan An

## 2018-08-08 NOTE — TELEPHONE ENCOUNTER
Patient notified and verbalized understanding. Patient knows were UC is in [facility] and will go there now.

## 2018-08-20 ENCOUNTER — OFFICE VISIT (OUTPATIENT)
Dept: FAMILY MEDICINE CLINIC | Facility: CLINIC | Age: 58
End: 2018-08-20
Payer: COMMERCIAL

## 2018-08-20 VITALS
HEART RATE: 92 BPM | SYSTOLIC BLOOD PRESSURE: 132 MMHG | DIASTOLIC BLOOD PRESSURE: 80 MMHG | TEMPERATURE: 97 F | BODY MASS INDEX: 30 KG/M2 | RESPIRATION RATE: 16 BRPM | WEIGHT: 215 LBS

## 2018-08-20 DIAGNOSIS — G25.2 INTENTION TREMOR: ICD-10-CM

## 2018-08-20 DIAGNOSIS — M17.11 PRIMARY OSTEOARTHRITIS OF RIGHT KNEE: Primary | ICD-10-CM

## 2018-08-20 DIAGNOSIS — Z01.818 PRE-OPERATIVE EXAMINATION: ICD-10-CM

## 2018-08-20 DIAGNOSIS — R06.83 SNORING: ICD-10-CM

## 2018-08-20 DIAGNOSIS — Z82.0 FAMILY HISTORY OF PARKINSON DISEASE: ICD-10-CM

## 2018-08-20 DIAGNOSIS — F32.9 MAJOR DEPRESSIVE DISORDER WITH SINGLE EPISODE, REMISSION STATUS UNSPECIFIED: ICD-10-CM

## 2018-08-20 PROCEDURE — 99214 OFFICE O/P EST MOD 30 MIN: CPT | Performed by: FAMILY MEDICINE

## 2018-08-20 NOTE — H&P
Georgina Hassan is a 62year old male who presents for a pre-operative physical exam. Patient is to have right knee total knee replacement, to be done by Dr. Leila Broderick at [facility] on 9/19/18.       HPI:   Pt complains of knee pain for several years, w • Preoperative examination, unspecified    • Tinea pedis    • Vertigo    • Visual impairment     glassses      Past Surgical History:  No date: APPENDECTOMY  No date: APPENDECTOMY  10/2003: CHOLECYSTECTOMY  1999: FOREARM/WRIST SURGERY UNLISTED      Comm thyroid history  ALL/ASTHMA: denies hx of allergy or asthma    EXAM:   /80   Pulse 92   Temp 96.7 °F (35.9 °C) (Temporal)   Resp 16   Wt 215 lb   BMI 29.99 kg/m²   GENERAL: well developed, well nourished,in no apparent distress  SKIN: no rashes,no lee ordered in this encounter    Imaging & Consults:  NEURO - INTERNAL  OP REFERRAL TO DIAGNOSTIC SLEEP STUDY

## 2018-08-23 ENCOUNTER — MED REC SCAN ONLY (OUTPATIENT)
Dept: FAMILY MEDICINE CLINIC | Facility: CLINIC | Age: 58
End: 2018-08-23

## 2018-08-27 ENCOUNTER — LABORATORY ENCOUNTER (OUTPATIENT)
Dept: LAB | Facility: HOSPITAL | Age: 58
End: 2018-08-27
Attending: ORTHOPAEDIC SURGERY
Payer: COMMERCIAL

## 2018-08-27 DIAGNOSIS — M17.11 PRIMARY OSTEOARTHRITIS OF RIGHT KNEE: ICD-10-CM

## 2018-08-27 LAB
ALBUMIN SERPL-MCNC: 4 G/DL (ref 3.5–4.8)
ALBUMIN/GLOB SERPL: 1.1 {RATIO} (ref 1–2)
ALP LIVER SERPL-CCNC: 92 U/L (ref 45–117)
ALT SERPL-CCNC: 82 U/L (ref 17–63)
ANION GAP SERPL CALC-SCNC: 6 MMOL/L (ref 0–18)
ANTIBODY SCREEN: POSITIVE
APTT PPP: 27.7 SECONDS (ref 26.1–34.6)
AST SERPL-CCNC: 37 U/L (ref 15–41)
ATRIAL RATE: 72 BPM
BASOPHILS # BLD AUTO: 0.04 X10(3) UL (ref 0–0.1)
BASOPHILS NFR BLD AUTO: 0.6 %
BILIRUB SERPL-MCNC: 0.6 MG/DL (ref 0.1–2)
BILIRUB UR QL STRIP.AUTO: NEGATIVE
BUN BLD-MCNC: 17 MG/DL (ref 8–20)
BUN/CREAT SERPL: 17.9 (ref 10–20)
CALCIUM BLD-MCNC: 9.2 MG/DL (ref 8.3–10.3)
CHLORIDE SERPL-SCNC: 105 MMOL/L (ref 101–111)
CLARITY UR REFRACT.AUTO: CLEAR
CO2 SERPL-SCNC: 29 MMOL/L (ref 22–32)
COLOR UR AUTO: YELLOW
CREAT BLD-MCNC: 0.95 MG/DL (ref 0.7–1.3)
EOSINOPHIL # BLD AUTO: 0.22 X10(3) UL (ref 0–0.3)
EOSINOPHIL NFR BLD AUTO: 3.5 %
ERYTHROCYTE [DISTWIDTH] IN BLOOD BY AUTOMATED COUNT: 12.8 % (ref 11.5–16)
GLOBULIN PLAS-MCNC: 3.7 G/DL (ref 2.5–4)
GLUCOSE BLD-MCNC: 86 MG/DL (ref 70–99)
GLUCOSE UR STRIP.AUTO-MCNC: NEGATIVE MG/DL
HCT VFR BLD AUTO: 41.9 % (ref 37–53)
HGB BLD-MCNC: 14.5 G/DL (ref 13–17)
IMMATURE GRANULOCYTE COUNT: 0.07 X10(3) UL (ref 0–1)
IMMATURE GRANULOCYTE RATIO %: 1.1 %
INR BLD: 1.03 (ref 0.9–1.1)
KETONES UR STRIP.AUTO-MCNC: NEGATIVE MG/DL
LEUKOCYTE ESTERASE UR QL STRIP.AUTO: NEGATIVE
LYMPHOCYTES # BLD AUTO: 1.99 X10(3) UL (ref 0.9–4)
LYMPHOCYTES NFR BLD AUTO: 31.7 %
M PROTEIN MFR SERPL ELPH: 7.7 G/DL (ref 6.1–8.3)
MCH RBC QN AUTO: 31.1 PG (ref 27–33.2)
MCHC RBC AUTO-ENTMCNC: 34.6 G/DL (ref 31–37)
MCV RBC AUTO: 89.9 FL (ref 80–99)
MONOCYTES # BLD AUTO: 0.68 X10(3) UL (ref 0.1–1)
MONOCYTES NFR BLD AUTO: 10.8 %
NEUTROPHIL ABS PRELIM: 3.28 X10 (3) UL (ref 1.3–6.7)
NEUTROPHILS # BLD AUTO: 3.28 X10(3) UL (ref 1.3–6.7)
NEUTROPHILS NFR BLD AUTO: 52.3 %
NITRITE UR QL STRIP.AUTO: NEGATIVE
OSMOLALITY SERPL CALC.SUM OF ELEC: 291 MOSM/KG (ref 275–295)
P AXIS: 41 DEGREES
P-R INTERVAL: 154 MS
PH UR STRIP.AUTO: 5 [PH] (ref 4.5–8)
PLATELET # BLD AUTO: 178 10(3)UL (ref 150–450)
POTASSIUM SERPL-SCNC: 4.3 MMOL/L (ref 3.6–5.1)
PROT UR STRIP.AUTO-MCNC: NEGATIVE MG/DL
PSA SERPL DL<=0.01 NG/ML-MCNC: 13.9 SECONDS (ref 12.4–14.7)
Q-T INTERVAL: 400 MS
QRS DURATION: 104 MS
QTC CALCULATION (BEZET): 438 MS
R AXIS: 55 DEGREES
RBC # BLD AUTO: 4.66 X10(6)UL (ref 4.3–5.7)
RBC UR QL AUTO: NEGATIVE
RED CELL DISTRIBUTION WIDTH-SD: 41.8 FL (ref 35.1–46.3)
RGTSCRN: 2
RH BLOOD TYPE: POSITIVE
SODIUM SERPL-SCNC: 140 MMOL/L (ref 136–144)
SP GR UR STRIP.AUTO: 1.01 (ref 1–1.03)
T AXIS: 46 DEGREES
UROBILINOGEN UR STRIP.AUTO-MCNC: <2 MG/DL
VENTRICULAR RATE: 72 BPM
WBC # BLD AUTO: 6.3 X10(3) UL (ref 4–13)

## 2018-08-27 PROCEDURE — 81003 URINALYSIS AUTO W/O SCOPE: CPT

## 2018-08-27 PROCEDURE — 93010 ELECTROCARDIOGRAM REPORT: CPT | Performed by: INTERNAL MEDICINE

## 2018-08-27 PROCEDURE — 85610 PROTHROMBIN TIME: CPT

## 2018-08-27 PROCEDURE — 86850 RBC ANTIBODY SCREEN: CPT

## 2018-08-27 PROCEDURE — 36415 COLL VENOUS BLD VENIPUNCTURE: CPT

## 2018-08-27 PROCEDURE — 93005 ELECTROCARDIOGRAM TRACING: CPT

## 2018-08-27 PROCEDURE — 80053 COMPREHEN METABOLIC PANEL: CPT

## 2018-08-27 PROCEDURE — 86922 COMPATIBILITY TEST ANTIGLOB: CPT

## 2018-08-27 PROCEDURE — 87081 CULTURE SCREEN ONLY: CPT

## 2018-08-27 PROCEDURE — 86905 BLOOD TYPING RBC ANTIGENS: CPT

## 2018-08-27 PROCEDURE — 85730 THROMBOPLASTIN TIME PARTIAL: CPT

## 2018-08-27 PROCEDURE — 86870 RBC ANTIBODY IDENTIFICATION: CPT

## 2018-08-27 PROCEDURE — 86901 BLOOD TYPING SEROLOGIC RH(D): CPT

## 2018-08-27 PROCEDURE — 85025 COMPLETE CBC W/AUTO DIFF WBC: CPT

## 2018-08-27 PROCEDURE — 86900 BLOOD TYPING SEROLOGIC ABO: CPT

## 2018-08-28 RX ORDER — BUPROPION HYDROCHLORIDE 450 MG/1
450 TABLET, FILM COATED, EXTENDED RELEASE ORAL DAILY
Qty: 90 TABLET | Refills: 0 | Status: SHIPPED | OUTPATIENT
Start: 2018-08-28 | End: 2018-09-01 | Stop reason: ALTCHOICE

## 2018-08-29 ENCOUNTER — TELEPHONE (OUTPATIENT)
Dept: FAMILY MEDICINE CLINIC | Facility: CLINIC | Age: 58
End: 2018-08-29

## 2018-08-29 DIAGNOSIS — M25.511 CHRONIC RIGHT SHOULDER PAIN: Primary | ICD-10-CM

## 2018-08-29 DIAGNOSIS — G89.29 CHRONIC RIGHT SHOULDER PAIN: Primary | ICD-10-CM

## 2018-08-29 NOTE — TELEPHONE ENCOUNTER
Pt would like to know if CATE can give him something other then [facility], He gets sick on it. His Shoulder is keeping him up at night. Advised CATE is out of office today and will call back tomorrow.    [phone]

## 2018-08-30 ENCOUNTER — ANESTHESIA EVENT (OUTPATIENT)
Dept: SURGERY | Facility: HOSPITAL | Age: 58
DRG: 470 | End: 2018-08-30
Payer: COMMERCIAL

## 2018-08-30 RX ORDER — TRAMADOL HYDROCHLORIDE 50 MG/1
50 TABLET ORAL NIGHTLY PRN
Qty: 15 TABLET | Refills: 0 | Status: SHIPPED
Start: 2018-08-30 | End: 2018-09-11

## 2018-08-30 NOTE — TELEPHONE ENCOUNTER
I can given him some tramadol for a few days. If it is bothering him that much, he should ask Dr. Omkar Reyes about it as well. Thanks.

## 2018-08-31 ENCOUNTER — TELEPHONE (OUTPATIENT)
Dept: FAMILY MEDICINE CLINIC | Facility: CLINIC | Age: 58
End: 2018-08-31

## 2018-08-31 NOTE — TELEPHONE ENCOUNTER
There aren't really other options given your surgery upcoming, I don't want you on any NSAIDs. You take take the tramadol and tylenol as needed for the headache. Ice, heat, whatever helps it feel better.

## 2018-08-31 NOTE — TELEPHONE ENCOUNTER
Pt would like to know if KE can give him something other then the Tramadol, It gives him Headache. And he cant take Norco because it makes him sick.      Please return call to [phone]

## 2018-09-01 NOTE — TELEPHONE ENCOUNTER
Fax from pharmacy stating insurance does not cover forfivo    Do you want to send bupropion 150 and 300 mg tab?

## 2018-09-01 NOTE — TELEPHONE ENCOUNTER
Patient notified and verbalized understanding. Advised neither tramadol or tylenol increase bleeding time.

## 2018-09-18 NOTE — H&P
Sudarshan Soriano Patient Status:  Surgery Admit    12/15/1960 MRN [id number]   [facility] SURGERY Attending Judd Brunner, MD   [facility] Day # 0 PCP Denney Primrose, DO     Date of Admission:  (Not on file) Father    • Diabetes Maternal Grandfather    • Diabetes Brother    • Other (rheumatoid arthritis) Maternal Grandmother       reports that  has never smoked. he has never used smokeless tobacco. He reports that he drinks alcohol.  He reports that he does not

## 2018-09-19 ENCOUNTER — ANESTHESIA (OUTPATIENT)
Dept: SURGERY | Facility: HOSPITAL | Age: 58
DRG: 470 | End: 2018-09-19
Payer: COMMERCIAL

## 2018-09-19 ENCOUNTER — HOSPITAL ENCOUNTER (INPATIENT)
Facility: HOSPITAL | Age: 58
LOS: 2 days | Discharge: HOME HEALTH | DRG: 470 | End: 2018-09-21
Attending: ORTHOPAEDIC SURGERY | Admitting: ORTHOPAEDIC SURGERY
Payer: COMMERCIAL

## 2018-09-19 DIAGNOSIS — M17.11 PRIMARY OSTEOARTHRITIS OF RIGHT KNEE: Primary | ICD-10-CM

## 2018-09-19 PROCEDURE — 0SRC0J9 REPLACEMENT OF RIGHT KNEE JOINT WITH SYNTHETIC SUBSTITUTE, CEMENTED, OPEN APPROACH: ICD-10-PCS | Performed by: ORTHOPAEDIC SURGERY

## 2018-09-19 PROCEDURE — 99233 SBSQ HOSP IP/OBS HIGH 50: CPT | Performed by: HOSPITALIST

## 2018-09-19 PROCEDURE — 3E0U33Z INTRODUCTION OF ANTI-INFLAMMATORY INTO JOINTS, PERCUTANEOUS APPROACH: ICD-10-PCS | Performed by: ANESTHESIOLOGY

## 2018-09-19 DEVICE — ATTUNE KNEE SYSTEM FEMORAL POSTERIOR STABILIZED SIZE 6 RIGHT CEMENTED
Type: IMPLANTABLE DEVICE | Site: KNEE | Status: FUNCTIONAL
Brand: ATTUNE

## 2018-09-19 DEVICE — ATTUNE KNEE SYSTEM TIBIAL INSERT ROTATING PLATFORM POSTERIOR STABILIZED 6 8MM AOX
Type: IMPLANTABLE DEVICE | Site: KNEE | Status: FUNCTIONAL
Brand: ATTUNE

## 2018-09-19 DEVICE — ATTUNE PATELLA MEDIALIZED DOME 41MM CEMENTED AOX
Type: IMPLANTABLE DEVICE | Site: KNEE | Status: FUNCTIONAL
Brand: ATTUNE

## 2018-09-19 DEVICE — ATTUNE KNEE SYSTEM TIBIAL BASE ROTATING PLATFORM SIZE 7 CEMENTED
Type: IMPLANTABLE DEVICE | Site: KNEE | Status: FUNCTIONAL
Brand: ATTUNE

## 2018-09-19 DEVICE — SMARTSET HV HIGH VISCOSITY BONE CEMENT 40G
Type: IMPLANTABLE DEVICE | Site: KNEE | Status: FUNCTIONAL
Brand: SMARTSET

## 2018-09-19 RX ORDER — BUPROPION HYDROCHLORIDE 100 MG/1
200 TABLET, EXTENDED RELEASE ORAL 2 TIMES DAILY
Status: DISCONTINUED | OUTPATIENT
Start: 2018-09-19 | End: 2018-09-21

## 2018-09-19 RX ORDER — MELATONIN
325
Status: DISCONTINUED | OUTPATIENT
Start: 2018-09-20 | End: 2018-09-21

## 2018-09-19 RX ORDER — MEPERIDINE HYDROCHLORIDE 25 MG/ML
12.5 INJECTION INTRAMUSCULAR; INTRAVENOUS; SUBCUTANEOUS AS NEEDED
Status: DISCONTINUED | OUTPATIENT
Start: 2018-09-19 | End: 2018-09-19 | Stop reason: HOSPADM

## 2018-09-19 RX ORDER — KETOROLAC TROMETHAMINE 30 MG/ML
30 INJECTION, SOLUTION INTRAMUSCULAR; INTRAVENOUS EVERY 6 HOURS
Status: COMPLETED | OUTPATIENT
Start: 2018-09-19 | End: 2018-09-20

## 2018-09-19 RX ORDER — SODIUM PHOSPHATE, DIBASIC AND SODIUM PHOSPHATE, MONOBASIC 7; 19 G/133ML; G/133ML
1 ENEMA RECTAL ONCE AS NEEDED
Status: DISCONTINUED | OUTPATIENT
Start: 2018-09-19 | End: 2018-09-21

## 2018-09-19 RX ORDER — METOCLOPRAMIDE HYDROCHLORIDE 5 MG/ML
10 INJECTION INTRAMUSCULAR; INTRAVENOUS EVERY 6 HOURS PRN
Status: ACTIVE | OUTPATIENT
Start: 2018-09-19 | End: 2018-09-21

## 2018-09-19 RX ORDER — OXYCODONE HYDROCHLORIDE 10 MG/1
10 TABLET ORAL EVERY 4 HOURS PRN
Status: DISPENSED | OUTPATIENT
Start: 2018-09-19 | End: 2018-09-21

## 2018-09-19 RX ORDER — HYDROCODONE BITARTRATE AND ACETAMINOPHEN 10; 325 MG/1; MG/1
1-2 TABLET ORAL EVERY 4 HOURS PRN
Qty: 80 TABLET | Refills: 0 | Status: SHIPPED | OUTPATIENT
Start: 2018-09-19 | End: 2018-11-13

## 2018-09-19 RX ORDER — BISACODYL 10 MG
10 SUPPOSITORY, RECTAL RECTAL
Status: DISCONTINUED | OUTPATIENT
Start: 2018-09-19 | End: 2018-09-21

## 2018-09-19 RX ORDER — TIZANIDINE 4 MG/1
4 TABLET ORAL 3 TIMES DAILY PRN
Status: DISCONTINUED | OUTPATIENT
Start: 2018-09-19 | End: 2018-09-21

## 2018-09-19 RX ORDER — ACETAMINOPHEN 325 MG/1
650 TABLET ORAL ONCE
Status: COMPLETED | OUTPATIENT
Start: 2018-09-19 | End: 2018-09-19

## 2018-09-19 RX ORDER — ZOLPIDEM TARTRATE 5 MG/1
5 TABLET ORAL NIGHTLY PRN
Status: DISCONTINUED | OUTPATIENT
Start: 2018-09-19 | End: 2018-09-21

## 2018-09-19 RX ORDER — MIDAZOLAM HYDROCHLORIDE 1 MG/ML
1 INJECTION INTRAMUSCULAR; INTRAVENOUS EVERY 5 MIN PRN
Status: DISCONTINUED | OUTPATIENT
Start: 2018-09-19 | End: 2018-09-19 | Stop reason: HOSPADM

## 2018-09-19 RX ORDER — CEFAZOLIN SODIUM/WATER 2 G/20 ML
SYRINGE (ML) INTRAVENOUS
Status: DISPENSED
Start: 2018-09-19 | End: 2018-09-19

## 2018-09-19 RX ORDER — CEFAZOLIN SODIUM/WATER 2 G/20 ML
2 SYRINGE (ML) INTRAVENOUS EVERY 8 HOURS
Status: COMPLETED | OUTPATIENT
Start: 2018-09-19 | End: 2018-09-20

## 2018-09-19 RX ORDER — DIPHENHYDRAMINE HYDROCHLORIDE 50 MG/ML
12.5 INJECTION INTRAMUSCULAR; INTRAVENOUS EVERY 4 HOURS PRN
Status: DISCONTINUED | OUTPATIENT
Start: 2018-09-19 | End: 2018-09-21

## 2018-09-19 RX ORDER — ACETAMINOPHEN 325 MG/1
650 TABLET ORAL 4 TIMES DAILY
Status: COMPLETED | OUTPATIENT
Start: 2018-09-19 | End: 2018-09-20

## 2018-09-19 RX ORDER — SCOLOPAMINE TRANSDERMAL SYSTEM 1 MG/1
1 PATCH, EXTENDED RELEASE TRANSDERMAL ONCE
Status: DISCONTINUED | OUTPATIENT
Start: 2018-09-19 | End: 2018-09-21

## 2018-09-19 RX ORDER — SENNOSIDES 8.6 MG
17.2 TABLET ORAL NIGHTLY
Status: DISCONTINUED | OUTPATIENT
Start: 2018-09-19 | End: 2018-09-21

## 2018-09-19 RX ORDER — DIPHENHYDRAMINE HYDROCHLORIDE 50 MG/ML
12.5 INJECTION INTRAMUSCULAR; INTRAVENOUS AS NEEDED
Status: DISCONTINUED | OUTPATIENT
Start: 2018-09-19 | End: 2018-09-19 | Stop reason: HOSPADM

## 2018-09-19 RX ORDER — ESCITALOPRAM OXALATE 20 MG/1
20 TABLET ORAL EVERY MORNING
Status: DISCONTINUED | OUTPATIENT
Start: 2018-09-19 | End: 2018-09-21

## 2018-09-19 RX ORDER — NALOXONE HYDROCHLORIDE 0.4 MG/ML
80 INJECTION, SOLUTION INTRAMUSCULAR; INTRAVENOUS; SUBCUTANEOUS AS NEEDED
Status: DISCONTINUED | OUTPATIENT
Start: 2018-09-19 | End: 2018-09-19 | Stop reason: HOSPADM

## 2018-09-19 RX ORDER — ACETAMINOPHEN 325 MG/1
TABLET ORAL
Status: COMPLETED
Start: 2018-09-19 | End: 2018-09-19

## 2018-09-19 RX ORDER — METHYLPREDNISOLONE ACETATE 80 MG/ML
INJECTION, SUSPENSION INTRA-ARTICULAR; INTRALESIONAL; INTRAMUSCULAR; SOFT TISSUE AS NEEDED
Status: DISCONTINUED | OUTPATIENT
Start: 2018-09-19 | End: 2018-09-19 | Stop reason: HOSPADM

## 2018-09-19 RX ORDER — BUPIVACAINE HYDROCHLORIDE 5 MG/ML
INJECTION, SOLUTION EPIDURAL; INTRACAUDAL AS NEEDED
Status: DISCONTINUED | OUTPATIENT
Start: 2018-09-19 | End: 2018-09-19 | Stop reason: HOSPADM

## 2018-09-19 RX ORDER — DIPHENHYDRAMINE HCL 25 MG
25 CAPSULE ORAL EVERY 4 HOURS PRN
Status: DISCONTINUED | OUTPATIENT
Start: 2018-09-19 | End: 2018-09-21

## 2018-09-19 RX ORDER — MORPHINE SULFATE 4 MG/ML
3 INJECTION, SOLUTION INTRAMUSCULAR; INTRAVENOUS
Status: ACTIVE | OUTPATIENT
Start: 2018-09-19 | End: 2018-09-20

## 2018-09-19 RX ORDER — DIPHENHYDRAMINE HYDROCHLORIDE 50 MG/ML
25 INJECTION INTRAMUSCULAR; INTRAVENOUS ONCE AS NEEDED
Status: ACTIVE | OUTPATIENT
Start: 2018-09-19 | End: 2018-09-19

## 2018-09-19 RX ORDER — MORPHINE SULFATE 4 MG/ML
2 INJECTION, SOLUTION INTRAMUSCULAR; INTRAVENOUS EVERY 5 MIN PRN
Status: DISCONTINUED | OUTPATIENT
Start: 2018-09-19 | End: 2018-09-19 | Stop reason: HOSPADM

## 2018-09-19 RX ORDER — CITALOPRAM 40 MG/1
40 TABLET ORAL DAILY
COMMUNITY
End: 2018-12-03

## 2018-09-19 RX ORDER — ACETAMINOPHEN 500 MG
1000 TABLET ORAL ONCE
Status: DISCONTINUED | OUTPATIENT
Start: 2018-09-19 | End: 2018-09-19 | Stop reason: HOSPADM

## 2018-09-19 RX ORDER — ONDANSETRON 2 MG/ML
4 INJECTION INTRAMUSCULAR; INTRAVENOUS AS NEEDED
Status: DISCONTINUED | OUTPATIENT
Start: 2018-09-19 | End: 2018-09-19 | Stop reason: HOSPADM

## 2018-09-19 RX ORDER — BUPROPION HYDROCHLORIDE 150 MG/1
150 TABLET ORAL DAILY
Status: DISCONTINUED | OUTPATIENT
Start: 2018-09-19 | End: 2018-09-19 | Stop reason: SDUPTHER

## 2018-09-19 RX ORDER — POLYETHYLENE GLYCOL 3350 17 G/17G
17 POWDER, FOR SOLUTION ORAL DAILY PRN
Status: DISCONTINUED | OUTPATIENT
Start: 2018-09-19 | End: 2018-09-21

## 2018-09-19 RX ORDER — SODIUM CHLORIDE, SODIUM LACTATE, POTASSIUM CHLORIDE, CALCIUM CHLORIDE 600; 310; 30; 20 MG/100ML; MG/100ML; MG/100ML; MG/100ML
INJECTION, SOLUTION INTRAVENOUS CONTINUOUS
Status: DISCONTINUED | OUTPATIENT
Start: 2018-09-19 | End: 2018-09-21

## 2018-09-19 RX ORDER — OXYCODONE HYDROCHLORIDE 15 MG/1
15 TABLET ORAL EVERY 4 HOURS PRN
Status: ACTIVE | OUTPATIENT
Start: 2018-09-19 | End: 2018-09-21

## 2018-09-19 RX ORDER — OXYCODONE HYDROCHLORIDE 5 MG/1
5 TABLET ORAL EVERY 4 HOURS PRN
Status: DISPENSED | OUTPATIENT
Start: 2018-09-19 | End: 2018-09-21

## 2018-09-19 RX ORDER — ONDANSETRON 2 MG/ML
4 INJECTION INTRAMUSCULAR; INTRAVENOUS EVERY 4 HOURS PRN
Status: ACTIVE | OUTPATIENT
Start: 2018-09-19 | End: 2018-09-21

## 2018-09-19 RX ORDER — SODIUM CHLORIDE, SODIUM LACTATE, POTASSIUM CHLORIDE, CALCIUM CHLORIDE 600; 310; 30; 20 MG/100ML; MG/100ML; MG/100ML; MG/100ML
INJECTION, SOLUTION INTRAVENOUS CONTINUOUS
Status: DISCONTINUED | OUTPATIENT
Start: 2018-09-19 | End: 2018-09-19 | Stop reason: HOSPADM

## 2018-09-19 RX ORDER — CEFAZOLIN SODIUM/WATER 2 G/20 ML
2 SYRINGE (ML) INTRAVENOUS ONCE
Status: DISCONTINUED | OUTPATIENT
Start: 2018-09-19 | End: 2018-09-19 | Stop reason: HOSPADM

## 2018-09-19 RX ORDER — DOCUSATE SODIUM 100 MG/1
100 CAPSULE, LIQUID FILLED ORAL 2 TIMES DAILY
Status: DISCONTINUED | OUTPATIENT
Start: 2018-09-19 | End: 2018-09-21

## 2018-09-19 RX ORDER — BUPROPION HYDROCHLORIDE 300 MG/1
300 TABLET ORAL DAILY
Status: DISCONTINUED | OUTPATIENT
Start: 2018-09-19 | End: 2018-09-19 | Stop reason: SDUPTHER

## 2018-09-19 NOTE — OPERATIVE REPORT
PREOPERATIVE DIAGNOSIS:  1 - Left knee osteoarthritis. 2 - Right knee osteoarthritis. POSTOPERATIVE DIAGNOSIS:   1 - Left knee osteoarthritis. 2 - Right knee osteoarthritis. PROCEDURE PERFORMED:   1 - Cemented right total knee arthroplasty.    2 - Inj for the tibia and 10 mm was taken off the posterior patella. Sizing was performed and a size 41 patella was selected. Three drill holes were placed. Trial was performed with a 6 femur, 7 tibia, 8 mm insert and 41 mm patella.  This gave good varus and valg

## 2018-09-19 NOTE — PHYSICAL THERAPY NOTE
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 Ambulated without AD.  Wife & son will be able to assist during recover currently have. ..  -   Turning over in bed (including adjusting bedclothes, sheets and blankets)?: A Little   -   Sitting down on and standing up from a chair with arms (e.g., wheelchair, bedside commode, etc.): A Little   -   Moving from lying on back to met;Call light within reach;RN aware of session/findings; All patient questions and concerns addressed;SCDs in place; Ice applied; Family present; Discussed recommendations with /    ASSESSMENT   Patient is a 62year old male admitted Patient is able to demonstrate transfers Sit to/from Stand at assistance level: supervison    Goal #3    Patient is able to ambulate 300 feet with assistive device at assistance level:Supervision    Goal #4    Patient will negotiate 4 stairs/one curb w/ a

## 2018-09-19 NOTE — CONSULTS
[facility]  [address] Patient Status:  Inpatient    12/15/1960 MRN [id number]   [facility] 3SW-A Attending Charisse Monroe MD   [address] Day # 0 PCP Gurvinder Riley DO     Reason for consult: med mgmt    Requested by History:   Family History   Problem Relation Age of Onset   • Cancer Mother         brain   • Cancer Paternal Grandfather         brain   • Psychiatric Son         Suicide attempt   • Other (Other) Son         POTS, cyclical vomiting    • Other (Other) Fat ROM RLE  Extremities: No edema or cyanosis. Integument: No rashes or lesions. Psychiatric: Appropriate mood and affect. Diagnostic Data:      Labs:  No results for input(s): WBC, HGB, MCV, PLT, BAND, INR in the last 168 hours.     Invalid input(s):

## 2018-09-19 NOTE — ANESTHESIA PREPROCEDURE EVALUATION
PRE-OP EVALUATION    Patient Name: Casimiro Corea    Pre-op Diagnosis: Primary osteoarthritis of right knee [M17.11]    Procedure(s):  RIGHT TOTAL KNEE REPLACEMENT, LEFT KNEE CORTISONE INJECTION    Surgeon(s) and Role:     Kaitlin Ocasio MD - Primary Disp: 60 tablet Rfl: 1   [DISCONTINUED] omeprazole 20 MG Oral Capsule Delayed Release Take 1 capsule (20 mg total) by mouth daily. Before meal with diclofenac use Disp: 30 capsule Rfl: 1       Allergies: Sulfa Antibiotics; Eggs Or Egg-Derived Products;  Nor CA 9.2 08/27/2018            Airway      Mallampati: II  Mouth opening: >3 FB  TM distance: > 6 cm  Neck ROM: full Cardiovascular    Cardiovascular exam normal.         Dental    No notable dental history.          Pulmonary    Pulmonary exam normal.

## 2018-09-19 NOTE — ANESTHESIA POSTPROCEDURE EVALUATION
[address] Patient Status:  Surgery Admit   Age/Gender 62year old male MRN [id number]   Location [address] [facility] [facility] [address] [facility] [address] Andre MD   Hosp Day # 0 PCP Sadaf Connor DO       Anesthesia Post-op N

## 2018-09-20 LAB
ERYTHROCYTE [DISTWIDTH] IN BLOOD BY AUTOMATED COUNT: 13.1 % (ref 11.5–16)
HCT VFR BLD AUTO: 35.2 % (ref 37–53)
HGB BLD-MCNC: 11.7 G/DL (ref 13–17)
MCH RBC QN AUTO: 30.3 PG (ref 27–33.2)
MCHC RBC AUTO-ENTMCNC: 33.2 G/DL (ref 31–37)
MCV RBC AUTO: 91.2 FL (ref 80–99)
PLATELET # BLD AUTO: 187 10(3)UL (ref 150–450)
RBC # BLD AUTO: 3.86 X10(6)UL (ref 4.3–5.7)
RED CELL DISTRIBUTION WIDTH-SD: 42.5 FL (ref 35.1–46.3)
WBC # BLD AUTO: 11.6 X10(3) UL (ref 4–13)

## 2018-09-20 PROCEDURE — 99232 SBSQ HOSP IP/OBS MODERATE 35: CPT | Performed by: HOSPITALIST

## 2018-09-20 RX ORDER — HYDROCODONE BITARTRATE AND ACETAMINOPHEN 10; 325 MG/1; MG/1
1 TABLET ORAL EVERY 4 HOURS PRN
Status: DISCONTINUED | OUTPATIENT
Start: 2018-09-21 | End: 2018-09-21

## 2018-09-20 RX ORDER — HYDROCODONE BITARTRATE AND ACETAMINOPHEN 10; 325 MG/1; MG/1
2 TABLET ORAL EVERY 4 HOURS PRN
Status: DISCONTINUED | OUTPATIENT
Start: 2018-09-21 | End: 2018-09-21

## 2018-09-20 RX ORDER — HYDROCODONE BITARTRATE AND ACETAMINOPHEN 10; 325 MG/1; MG/1
2 TABLET ORAL EVERY 4 HOURS PRN
Status: DISCONTINUED | OUTPATIENT
Start: 2018-09-20 | End: 2018-09-20

## 2018-09-20 RX ORDER — HYDROCODONE BITARTRATE AND ACETAMINOPHEN 10; 325 MG/1; MG/1
1 TABLET ORAL EVERY 4 HOURS PRN
Status: DISCONTINUED | OUTPATIENT
Start: 2018-09-20 | End: 2018-09-20

## 2018-09-20 NOTE — PROGRESS NOTES
[address] Patient Status:  Inpatient    12/15/1960 MRN [id number]   [facility] 3SW-A Attending Corinna Díaz MD   [address] Day # 1 PCP Valentin Ramírez DO     Subjective:  S/P RIGHT Total Knee Arthroplasty  Systemic or

## 2018-09-20 NOTE — PAYOR COMM NOTE
--------------  ADMISSION REVIEW     Payor: Félix Newman [employer]  Subscriber #:  [id number]  Authorization Number: [id number]    Admit date: 9/19/18  Admit time: [address]       Admitting Physician: Omar Patterson MD  Attending Physician:  Kymberly Grande wrist  No date: LEG/ANKLE SURGERY PROC UNLISTED      Comment:  fx R leg  No date: OTHER SURGICAL HISTORY      Comment:  exploratory laparotomy  No date: OTHER SURGICAL HISTORY      Comment:  partial colectomy  3/2011: [address] positive     Primary osteoarthritis involving multiple joints    Plan is for cemented right total knee arthroplasty  Kurt Line  9/18/2018  5:29 PM    Operative Report  PREOPERATIVE DIAGNOSIS:  1 - Left knee osteoarthritis.    2 - Right knee osteoarthr was performed and a size 7 fit well. There were equal medial and lateral gaps when checked with a spacer. Equal flexion and extension gaps were obtained. The stem cut was made for the tibia and 10 mm was taken off the posterior patella.  Sizing was perform Action Dose Route User    9/20/2018 0810 Given 2.5 mg Oral Sheila Hobson RN      ceFAZolin sodium (ANCEF/KEFZOL) 2 GM/20ML premix IV syringe 2 g     Date Action Dose Route User    9/20/2018 0531 Given 2 g Intravenous Gustavo Lamar RN    9/19/2018 ADMISSION    PLEASE FAX ALL INPT DAYS AS AUTHORIZED ALONG W/NRD

## 2018-09-20 NOTE — PHYSICAL THERAPY NOTE
PHYSICAL THERAPY KNEE TREATMENT NOTE - INPATIENT     Room Number: 383/383-A     Session: 1&2    Number of Visits to Meet Established Goals: 3    Presenting Problem: S/p Cemented Right TKA & Left knee injection on 09/19/18    Problem List  Active Problems: 5  Location: Right knee @ surgical site  Management Techniques: Activity promotion; Body mechanics;Breathing techniques;Relaxation;Repositioning    BALANCE  Static Sitting: Good  Dynamic Sitting: Good  Static Standing: Fair -  Dynamic Standing: Poor +    AC occasional assist and cues for technique. Pt continues to benefit from gait training, ambulated with  feet with sup assist. Pt needs cues to correct proper gait pattern during mobility. Pt was trained on stairs with CGA/sup for safety.  Pt negotiated RECOMMENDATIONS  PT Discharge Recommendations: Home with home health PT    PLAN  PT Treatment Plan: Bed mobility; Endurance; Energy conservation;Patient education; Family education;Gait training;Neuromuscular re-educate;Range of motion;Strengthening;Stoop tra

## 2018-09-20 NOTE — HOME CARE LIAISON
REFERRAL RECEIVED FROM CO[facility] HERNANDEZ. MET WITH PATIENT AT BEDSIDE. PATIENT IS AGREEABLE TO [address] [facility] RN/PT.

## 2018-09-20 NOTE — PROGRESS NOTES
[facility]  Progress Note     Aman Su Patient Status:  Inpatient    12/15/1960 MRN [id number]   [facility] 3SW-A Attending Maria Isabel Rivas MD   [address] Day # 1 PCP Tracy Zarco DO     Chief Complaint: med mgmt    S: Patient anemia  1. monitor  3. Depression  1. Cont. Home regimen      Plan of care: doing well.     Quality:  · DVT Prophylaxis: apixaban  · CODE status: full  · Landry: no  · Central line: no    Estimated date of discharge: TBD  Discharge is dependent on: clinical

## 2018-09-20 NOTE — CM/SW NOTE
09/20/18 1700   CM/SW Referral Data   Referral Source Physician   Reason for Referral Discharge planning   Informant Patient;Edward Staff   Social History   Suicidal Ideation/Depression/Mental Health Issues anxious   Patient Info   Patient's Mental Stat

## 2018-09-20 NOTE — BH PROGRESS NOTE
[facility] Resource Referral Counselor Note    Negro Muñoz Patient Status:  Inpatient    12/15/1960 MRN [id number]   [facility] 3SW-A Attending Alo Diaz MD   [facility] Day # 1 PCP DO KASSI Nina(subjective) The p

## 2018-09-20 NOTE — OCCUPATIONAL THERAPY NOTE
OCCUPATIONAL THERAPY QUICK EVALUATION - INPATIENT    Room Number: 383/383-A  Evaluation Date: 9/20/2018     Type of Evaluation: Quick Eval  Presenting Problem: s/p R TKA; L knee cortisone injection    Physician Order: IP Consult to Occupational Therapy  Re colectomy  3/2011: SPINE SURGERY PROCEDURE UNLISTED      Comment:  cervical vertebral fusion  1999: VASECTOMY    OCCUPATIONAL PROFILE    HOME SITUATION  Type of Home: House  Home Layout: Two level  Lives With: Spouse;Son(18 years old son who is studying to independent  Sit to Stand: Supervision    Skilled Therapy Provided: Patient educated on OT role, goals, recommendations, safety and precautions and ae/dme.     Patient was introduced to lower body dressing equipment and instructed in the use of reacher and presents with no skilled Occupational Therapy needs at this time. Patient discharged from Occupational Therapy services. Please re-order if a new functional limitation presents during this admission.     Patient was able to achieve the following goals:  P

## 2018-09-21 VITALS
TEMPERATURE: 98 F | WEIGHT: 221.44 LBS | HEIGHT: 71 IN | HEART RATE: 58 BPM | SYSTOLIC BLOOD PRESSURE: 117 MMHG | OXYGEN SATURATION: 91 % | BODY MASS INDEX: 31 KG/M2 | RESPIRATION RATE: 16 BRPM | DIASTOLIC BLOOD PRESSURE: 66 MMHG

## 2018-09-21 LAB
ERYTHROCYTE [DISTWIDTH] IN BLOOD BY AUTOMATED COUNT: 13.4 % (ref 11.5–16)
HCT VFR BLD AUTO: 32.8 % (ref 37–53)
HGB BLD-MCNC: 11.3 G/DL (ref 13–17)
MCH RBC QN AUTO: 32 PG (ref 27–33.2)
MCHC RBC AUTO-ENTMCNC: 34.5 G/DL (ref 31–37)
MCV RBC AUTO: 92.9 FL (ref 80–99)
PLATELET # BLD AUTO: 166 10(3)UL (ref 150–450)
RBC # BLD AUTO: 3.53 X10(6)UL (ref 4.3–5.7)
RED CELL DISTRIBUTION WIDTH-SD: 45.6 FL (ref 35.1–46.3)
WBC # BLD AUTO: 11.6 X10(3) UL (ref 4–13)

## 2018-09-21 PROCEDURE — 99232 SBSQ HOSP IP/OBS MODERATE 35: CPT | Performed by: HOSPITALIST

## 2018-09-21 NOTE — DISCHARGE SUMMARY
[facility]  Discharge Summary    Ana Valdovinos Patient Status:  Inpatient    12/15/1960 MRN [id number]   [facility] 3SW-A Attending No att. providers found   Hosp Day # 2 PCP Seun Garcia DO     Date of Admission: 2018 Disposit * This list has 2 medication(s) that are the same as other medications prescribed for you. Read the directions carefully, and ask your doctor or other care provider to review them with you.             STOP taking these medications    ACETAMINOPHEN EXTRA S

## 2018-09-21 NOTE — PHYSICAL THERAPY NOTE
PHYSICAL THERAPY KNEE TREATMENT NOTE - INPATIENT     Room Number: 383/383-A     Session: 3  Number of Visits to Meet Established Goals: 3    Presenting Problem: S/p Cemented Right TKA & Left knee injection on 09/19/18    Problem List  Active Problems: knee @ surgical site  Management Techniques: Activity promotion; Body mechanics;Breathing techniques;Relaxation;Repositioning    BALANCE  Static Sitting: Good  Dynamic Sitting: Good  Static Standing: Fair -  Dynamic Standing: Poor +    ACTIVITY TOLERANCE  R reps   Sitting Knee Flexion 20 reps   Standing heel/toe raises 20 reps   Standing knee flexion 20 reps   Extension stretch  1x     Comments: Pt participated in group session, tolerance was good.    was present no   is a therapist.    Knee ROM   R 75 degrees flexion    met    Goal #6           Goal Comments: Goals established on 9/19/2018.  ongoing

## 2018-09-21 NOTE — PROGRESS NOTES
Acute Pain Service    Post Op Day 2 Ortho Note    Assessed patient in bed. Patient rates pain 2-3/10 at rest and 4-5/10 with activity. Patient states Celsa Cohen is working well to manage pain; denies itching/nausea/dizziness.     Patient able to bear weight on s

## 2018-09-21 NOTE — PROGRESS NOTES
[address] Patient Status:  Inpatient    12/15/1960 MRN [id number]   [facility] 3SW-A Attending Gaurav Nash MD   [address] Day # 2 PCP Foster Jo DO     Subjective:  S/P RIGHT Total Knee Arthroplasty  Systemic or

## 2018-09-21 NOTE — PROGRESS NOTES
Patient attended group discharge education class. Discharge education provided utilizing \"hip/knee replacement discharge instructions\" sheet. Teach back done. Questions solicited and answered. Tolerated activity well.  RN notified of concerns about narcot

## 2018-09-21 NOTE — CM/SW NOTE
09/21/18 1525   Discharge disposition   Expected discharge disposition Home-Health   Name of Facillity/Home Care/Hospice Residential   Discharge transportation Private car

## 2018-09-21 NOTE — PROGRESS NOTES
[facility]  Progress Note     Brooke Hobson Patient Status:  Inpatient    12/15/1960 MRN [id number]   [facility] 3SW-A Attending Krystina Lehman MD   [facility] Day # 2 PCP Aurora Nuñez DO     Chief Complaint: med mgmt    S: Patient anemia  1. monitor  3. Depression  1. Cont. Home regimen      Plan of care: doing well.  Ok to DC today    Quality:  · DVT Prophylaxis: apixaban  · CODE status: full  · Landry: no  · Central line: no    Estimated date of discharge: today  Discharge is depend

## 2018-09-21 NOTE — PLAN OF CARE
PAIN - ADULT    • Verbalizes/displays adequate comfort level or patient's stated pain goal Progressing        Patient/Family Goals    • Patient/Family Short Term Goal Progressing        States pain well controlled on oral pain medication.   Dressing to righ

## 2018-09-25 LAB — BLOOD TYPE BARCODE: 6200

## 2018-09-27 ENCOUNTER — OFFICE VISIT (OUTPATIENT)
Dept: FAMILY MEDICINE CLINIC | Facility: CLINIC | Age: 58
End: 2018-09-27
Payer: COMMERCIAL

## 2018-09-27 VITALS
HEIGHT: 71 IN | WEIGHT: 214 LBS | TEMPERATURE: 97 F | RESPIRATION RATE: 16 BRPM | BODY MASS INDEX: 29.96 KG/M2 | DIASTOLIC BLOOD PRESSURE: 86 MMHG | HEART RATE: 84 BPM | SYSTOLIC BLOOD PRESSURE: 130 MMHG

## 2018-09-27 DIAGNOSIS — Z96.651 STATUS POST TOTAL RIGHT KNEE REPLACEMENT: ICD-10-CM

## 2018-09-27 DIAGNOSIS — G89.29 CHRONIC PAIN OF RIGHT KNEE: Primary | ICD-10-CM

## 2018-09-27 DIAGNOSIS — M25.561 CHRONIC PAIN OF RIGHT KNEE: Primary | ICD-10-CM

## 2018-09-27 PROCEDURE — 99214 OFFICE O/P EST MOD 30 MIN: CPT | Performed by: FAMILY MEDICINE

## 2018-09-27 NOTE — PROGRESS NOTES
Ana Valdovinos is a 62year old male. Patient presents with:  Procedure Follow Up: follow up from knee surgery       HPI:   Post op check. Right total knee replacement at [address]  by Dr. Danielle Tadeo on 9/19/18. He is feeling overall well.    Has home health, Social History:  Social History    Tobacco Use      Smoking status: Never Smoker      Smokeless tobacco: Never Used    Alcohol use:  Yes      Alcohol/week: 0.0 oz      Comment: special occ - weddings     Drug use: No       BP Readings from Last 6 Encounte types were placed in this encounter. Meds & Refills for this Visit:  Requested Prescriptions      No prescriptions requested or ordered in this encounter             The patient indicates understanding of these issues and agrees to the plan.

## 2018-09-29 ENCOUNTER — HOSPITAL ENCOUNTER (EMERGENCY)
Facility: HOSPITAL | Age: 58
Discharge: HOME | End: 2018-09-29
Attending: EMERGENCY MEDICINE
Payer: COMMERCIAL

## 2018-09-29 ENCOUNTER — TELEPHONE (OUTPATIENT)
Dept: FAMILY MEDICINE CLINIC | Facility: CLINIC | Age: 58
End: 2018-09-29

## 2018-09-29 VITALS
RESPIRATION RATE: 18 BRPM | HEIGHT: 71 IN | WEIGHT: 215 LBS | HEART RATE: 74 BPM | DIASTOLIC BLOOD PRESSURE: 92 MMHG | SYSTOLIC BLOOD PRESSURE: 127 MMHG | OXYGEN SATURATION: 98 % | TEMPERATURE: 98 F | BODY MASS INDEX: 30.1 KG/M2

## 2018-09-29 DIAGNOSIS — M25.461 KNEE EFFUSION, RIGHT: Primary | ICD-10-CM

## 2018-09-29 DIAGNOSIS — R58 ECCHYMOSIS: ICD-10-CM

## 2018-09-29 PROCEDURE — 99282 EMERGENCY DEPT VISIT SF MDM: CPT

## 2018-09-29 NOTE — TELEPHONE ENCOUNTER
Pt's dogs were play fighting with each other and pt got caught in the mix. Pt says he has been changing the bandage twice a day. But noticed his ankle is turning black and he can feel his foot.

## 2018-09-29 NOTE — TELEPHONE ENCOUNTER
Patient states on Thursday after the appt with Dr Neva Grayson he went home and laid down on his bed. His dogs were playing and his surgical leg ended up being knocked off the bed. States it caused a lot of pain.  Yesterday home health noticed he had a large brui

## 2018-09-29 NOTE — ED INITIAL ASSESSMENT (HPI)
Pt w/ R knee replacement on 9/19/18. Pt had a little trauma to knee 2 days ago and now has increase in drainage. Pt also has numbness to R foot. PP +3. Pt w/ hx of DVT. Pt on Eliquis.

## 2018-09-29 NOTE — ED PROVIDER NOTES
Patient Seen in: [facility] Emergency Department    History   Patient presents with:  Postop/Procedure Problem  Numbness Weakness (neurologic)    Stated Complaint: postop bleeding to knee     HPI    17-year-old male presents emergency department who Comment:  cervical vertebral fusion  1999: VASECTOMY        Social History    Tobacco Use      Smoking status: Never Smoker      Smokeless tobacco: Never Used    Alcohol use:  Yes      Alcohol/week: 0.0 oz      Comment: special occ - weddings     Drug use: compartment syndrome at this point. MDM   Patient was made aware of medical condition and instructed to take medications as prescribed. Patient is aware that they are to return to ED if any worsening problems.   Patient was also instructed to followup

## 2018-10-02 ENCOUNTER — HOSPITAL ENCOUNTER (OUTPATIENT)
Dept: GENERAL RADIOLOGY | Age: 58
Discharge: HOME | End: 2018-10-02
Attending: ORTHOPAEDIC SURGERY
Payer: COMMERCIAL

## 2018-10-02 DIAGNOSIS — M17.11 PRIMARY OSTEOARTHRITIS OF RIGHT KNEE: ICD-10-CM

## 2018-10-02 DIAGNOSIS — Z96.651 STATUS POST TOTAL RIGHT KNEE REPLACEMENT: ICD-10-CM

## 2018-10-02 PROCEDURE — 73560 X-RAY EXAM OF KNEE 1 OR 2: CPT | Performed by: ORTHOPAEDIC SURGERY

## 2018-10-04 ENCOUNTER — OFFICE VISIT (OUTPATIENT)
Dept: SLEEP CENTER | Age: 58
End: 2018-10-04
Attending: FAMILY MEDICINE
Payer: COMMERCIAL

## 2018-10-04 DIAGNOSIS — F32.9 MAJOR DEPRESSIVE DISORDER WITH SINGLE EPISODE, REMISSION STATUS UNSPECIFIED: ICD-10-CM

## 2018-10-04 DIAGNOSIS — R06.83 SNORING: ICD-10-CM

## 2018-10-04 PROCEDURE — 95811 POLYSOM 6/>YRS CPAP 4/> PARM: CPT

## 2018-10-10 NOTE — PROCEDURES
[address] [address],[facility] [address]       Accredited by the [facility] (AASM)    PATIENT'S NAME:        Anupam Beltran  ATTENDING PHYSICIAN:   Moira Elder DO  REFERRING PHYSICIAN:   Moira Elder, [address] spent 2.6% of total sleep time in N1, 97.4% in N2, none of N3, and 0 REM. Sleep latency was 35.6 minutes. REM latency was nonexistent. Sleep efficiency was 46.7%. Sleep maintenance efficiency was 59.7%.   Total wake time was 87 minutes for a total wake associated with arousals. The limb movement index was 21.4 with a PLM index of 3.0 per hour. The limb movement arousal was 20.5 per hour.       CARDIAC SUMMARY:  During diagnostic portion of the study, the average pulse rate was 80.2 beats per minute, the Period Time: 126.5 316.0 447.0   Sleep Efficiency: 46.7% 65.4% 59.0%   AHI: 93.0 41.0 55.1   RDI:  97.0 44.3 58.6   Arousal Index: 100.9 64.2 74.2   Awakening Index: 18.3 6.8 9.9   PLM Index: 33.4 3.0 11.2   RERA Count: 5 11 16   RERA Index: 4.0 3.3 3.5 With Apnea - - - - - - - -   With Hypopnea 109 86.6 - - 130 38.6 - -   With PLMs 40 31.8 - - 10 3.0 - -   With Isolated Limb Movements 16 12.7 - - 59 17.5 - -   With Total Limb Movements 56 44.5 - - 69 20.5 - -   With Snoring 102 81.1 - - 132 39.2 - - 42.2 - 42.4 52.6 44.3   Snores 136 - 109 27 136   Snore Index 40.4 - 39.5 44.4 40.4        202.0         RESPIRATORY EVENT DURATIONS     NREM REM    Average (secs) Maximum (secs) Average (secs) Maximum (secs)   Apnea - - - -   Hypopnea 27.1 50.6 - - 14:15:15  t: 10/09/2018 14:42:23  Job [id number]  [facility]/    cc: Ximena Armando

## 2018-10-11 NOTE — PROGRESS NOTES
Recommend  follow up to assure compliance and efficacy. Avoid alcohol, sedatives and other cns depressants that may worsen sleep apnea and disrupt normal sleep architecture. Sleep hygiene should be review to assess factors that may improve sleep quality.

## 2018-10-12 ENCOUNTER — TELEPHONE (OUTPATIENT)
Dept: FAMILY MEDICINE CLINIC | Facility: CLINIC | Age: 58
End: 2018-10-12

## 2018-10-12 DIAGNOSIS — G47.33 OSA (OBSTRUCTIVE SLEEP APNEA): Primary | ICD-10-CM

## 2018-10-12 NOTE — TELEPHONE ENCOUNTER
----- Message from Janet Banegas MD sent at 10/11/2018  8:28 AM CDT -----  Recommend  follow up to assure compliance and efficacy. Avoid alcohol, sedatives and other cns depressants that may worsen sleep apnea and disrupt normal sleep architecture.   Sleep

## 2018-10-12 NOTE — TELEPHONE ENCOUNTER
Patient notified of results and recommendations and expressed understanding.   Discussed in detail with patient  Patient is agreeable to starting an auto-titrating bipap machine  Need to send everything to [facility]  Need to request an in-home set up due to t

## 2018-10-16 ENCOUNTER — TELEPHONE (OUTPATIENT)
Dept: FAMILY MEDICINE CLINIC | Facility: CLINIC | Age: 58
End: 2018-10-16

## 2018-10-16 DIAGNOSIS — Z47.89 ORTHOPEDIC AFTERCARE: ICD-10-CM

## 2018-10-16 DIAGNOSIS — Z96.651 S/P TOTAL KNEE ARTHROPLASTY, RIGHT: ICD-10-CM

## 2018-10-16 DIAGNOSIS — M17.11 PRIMARY OSTEOARTHRITIS OF RIGHT KNEE: Primary | ICD-10-CM

## 2018-10-16 NOTE — TELEPHONE ENCOUNTER
PT CALLED AND ADV THAT HE WILL BE GOING TO [facility] IN [facility]. PT WAS ADV THAT HE NEEDS REFERRAL FOR ATI.     CAN YOU PLEASE SEND REFERRAL TO [facility]-  PH. [phone]    PT HAS AN APPT TONIGHT, PLEASE CALL PT AND ADV IF NOT ABLE TO GET REFER

## 2018-10-17 ENCOUNTER — OFFICE VISIT (OUTPATIENT)
Dept: NEUROLOGY | Facility: CLINIC | Age: 58
End: 2018-10-17
Payer: COMMERCIAL

## 2018-10-17 ENCOUNTER — PATIENT OUTREACH (OUTPATIENT)
Dept: CASE MANAGEMENT | Age: 58
End: 2018-10-17

## 2018-10-17 VITALS
HEIGHT: 71 IN | SYSTOLIC BLOOD PRESSURE: 122 MMHG | HEART RATE: 78 BPM | RESPIRATION RATE: 14 BRPM | BODY MASS INDEX: 29.26 KG/M2 | WEIGHT: 209 LBS | DIASTOLIC BLOOD PRESSURE: 78 MMHG

## 2018-10-17 DIAGNOSIS — R25.1 TREMOR: ICD-10-CM

## 2018-10-17 DIAGNOSIS — Z02.9 ENCOUNTERS FOR ADMINISTRATIVE PURPOSE: ICD-10-CM

## 2018-10-17 DIAGNOSIS — G60.9 IDIOPATHIC PERIPHERAL NEUROPATHY: Primary | ICD-10-CM

## 2018-10-17 PROCEDURE — 99204 OFFICE O/P NEW MOD 45 MIN: CPT | Performed by: OTHER

## 2018-10-17 PROCEDURE — 1111F DSCHRG MED/CURRENT MED MERGE: CPT

## 2018-10-17 NOTE — PATIENT INSTRUCTIONS
[facility]  [facility],  please direct any follow up questions or medication needs to the staff in our  [place] office so that your concerns may be promptly addressed.   We are available through [employer] [facility] or at the numbers below:

## 2018-10-17 NOTE — H&P
Neurology H&P    Veena Smith Patient Status:  No patient class for patient encounter    12/15/1960 MRN [id number]   Location ED [facility] Attending No att. providers found   Hosp Day # 0 PCP Marcy Montoya DO     Subjective:  Veena Smith mouth daily.  Take with 300 mg tab to equal 450 mg daily Disp: 90 tablet Rfl: 0       Problem List:  Patient Active Problem List:     Sleep disturbance     Major depressive disorder, single episode     Paralysis of vocal cords or larynx, unspecified     Obs Comment: special occ - weddings     Drug use: No      Family History:  Family History   Problem Relation Age of Onset   • Cancer Mother         brain   • Cancer Paternal Grandfather         brain   • Psychiatric Son         Suicide attempt   • Other (Other EXAMINATION:  Reports total absence of sensation to all modalities over entire body. Cannot feel any sensory modality on face, arms legs, back and all of torso, report complete absence of proprioception in all fingers and at toes but present at wrists. gabapentin etc for neuropathy.       Dexter Gamez, DO  Neurology

## 2018-10-17 NOTE — PROGRESS NOTES
Initial Post Discharge Follow Up   Discharge Date: 9/21/18  Contact Date: 10/17/2018    Consent Verification:  Assessment Completed With: Patient  HIPAA Verified?   Yes    Discharge Dx:   osteoarthritis of right knee, s/p Right TKR by Dr. Joseph Rudolph [facility])    Oct 30, 2018 10:10 AM CDT FOLLOW UP with Lauren Mckeon MD [address] [place] [address] AT [facility])    Dec 13, 2018  9:50 AM CST Consult with Saranya Hernandes MD [address] [address], [place], Cory Arellano (Miguel Winston

## 2018-11-09 ENCOUNTER — TELEPHONE (OUTPATIENT)
Dept: FAMILY MEDICINE CLINIC | Facility: CLINIC | Age: 58
End: 2018-11-09

## 2018-11-09 NOTE — TELEPHONE ENCOUNTER
Patient had knee surgery with Dr Laura Wang in September. There is a spot on the incision that keeps scabbing over and then the scab comes off and it bleeds. This is the 3rd time it has come off and it is bleeding again.  What does Dr Mariano Barlow suggest or should

## 2018-11-09 NOTE — TELEPHONE ENCOUNTER
Patient notified and verbalized understanding. Patient states at one time Dr Ann Nobles had mentioned wound therapy  Advised patient to contact Dr Ann Nobles to discuss wound therapy.

## 2018-11-13 PROBLEM — M19.012 ARTHRITIS OF LEFT GLENOHUMERAL JOINT: Status: ACTIVE | Noted: 2018-11-13

## 2018-11-21 ENCOUNTER — TELEPHONE (OUTPATIENT)
Dept: FAMILY MEDICINE CLINIC | Facility: CLINIC | Age: 58
End: 2018-11-21

## 2018-11-21 NOTE — TELEPHONE ENCOUNTER
Patient advised to schedule pre op. Verbalized understanding. Call routed to  to schedule. Huston Hams, DO Corrinne Frohlich Nurse             Please schedule for pre-op appointment.    See letter

## 2018-12-02 DIAGNOSIS — F32.9 MAJOR DEPRESSIVE DISORDER WITH SINGLE EPISODE, REMISSION STATUS UNSPECIFIED: ICD-10-CM

## 2018-12-02 DIAGNOSIS — F32.4 MAJOR DEPRESSIVE DISORDER WITH SINGLE EPISODE, IN PARTIAL REMISSION (HCC): ICD-10-CM

## 2018-12-03 ENCOUNTER — OFFICE VISIT (OUTPATIENT)
Dept: FAMILY MEDICINE CLINIC | Facility: CLINIC | Age: 58
End: 2018-12-03
Payer: COMMERCIAL

## 2018-12-03 VITALS
HEIGHT: 71 IN | TEMPERATURE: 98 F | DIASTOLIC BLOOD PRESSURE: 88 MMHG | HEART RATE: 76 BPM | WEIGHT: 223 LBS | RESPIRATION RATE: 16 BRPM | SYSTOLIC BLOOD PRESSURE: 122 MMHG | BODY MASS INDEX: 31.22 KG/M2

## 2018-12-03 DIAGNOSIS — F32.4 MAJOR DEPRESSIVE DISORDER WITH SINGLE EPISODE, IN PARTIAL REMISSION (HCC): ICD-10-CM

## 2018-12-03 DIAGNOSIS — G60.9 IDIOPATHIC PERIPHERAL NEUROPATHY: ICD-10-CM

## 2018-12-03 DIAGNOSIS — Z01.818 PRE-OPERATIVE EXAMINATION: ICD-10-CM

## 2018-12-03 DIAGNOSIS — G47.33 OSA ON CPAP: ICD-10-CM

## 2018-12-03 DIAGNOSIS — F32.9 MAJOR DEPRESSIVE DISORDER WITH SINGLE EPISODE, REMISSION STATUS UNSPECIFIED: ICD-10-CM

## 2018-12-03 DIAGNOSIS — M19.012 ARTHRITIS OF LEFT GLENOHUMERAL JOINT: Primary | ICD-10-CM

## 2018-12-03 DIAGNOSIS — Z99.89 OSA ON CPAP: ICD-10-CM

## 2018-12-03 PROCEDURE — 99214 OFFICE O/P EST MOD 30 MIN: CPT | Performed by: FAMILY MEDICINE

## 2018-12-03 RX ORDER — CITALOPRAM 40 MG/1
TABLET ORAL
Qty: 90 TABLET | Refills: 0 | OUTPATIENT
Start: 2018-12-03

## 2018-12-03 RX ORDER — CITALOPRAM 40 MG/1
40 TABLET ORAL DAILY
Qty: 90 TABLET | Refills: 1 | Status: SHIPPED | OUTPATIENT
Start: 2018-12-03 | End: 2019-01-21 | Stop reason: ALTCHOICE

## 2018-12-03 RX ORDER — BUPROPION HYDROCHLORIDE 300 MG/1
300 TABLET ORAL DAILY
Qty: 90 TABLET | Refills: 1 | Status: ON HOLD | OUTPATIENT
Start: 2018-12-03 | End: 2018-12-20 | Stop reason: DRUGHIGH

## 2018-12-03 RX ORDER — BUPROPION HYDROCHLORIDE 150 MG/1
TABLET ORAL
Qty: 90 TABLET | Refills: 0 | OUTPATIENT
Start: 2018-12-03

## 2018-12-03 NOTE — TELEPHONE ENCOUNTER
Citalopram Last refill listed as historical  Bupropion refilled 9/19/18 #90 0 refill 9/1/18  Last OV 9/27/18  Future Appointments   Date Time Provider Alok Darling   12/3/2018 11:00 AM Donnie Luna [facility]   12/11/2018 10:10 AM Hailee Lowry

## 2018-12-03 NOTE — H&P
Austen Robles is a 62year old male who presents for a pre-operative physical exam. Patient is to have left total shoulder arthroplasty, to be done by Dr. Susan Dalal at [facility] on 12/20/18.       HPI:   Pt complains of left shoulder pain for several ye Depression    • Neuropathy     \"whole body\" per patient   • Osteoarthritis    • Pre-operative cardiovascular examination    • Preoperative examination, unspecified    • Tinea pedis    • Vertigo    • Visual impairment     glassses      Past Surgical Histo exertion or MILLER   GI: denies abdominal pain,denies heartburn  : denies dysuria or hematuria   MUSCULOSKELETAL: back pain is stable, knee is much better, shoulder is terrible   NEURO: denies headaches  PSYCHE:  Depression is improved and stable   HEMATOLO Essential tremor - back off on welbutrin to 300 mg daily and see if this helps, follow up scheduled with Dr. Britton Dunlap in 6 weeks. Pre-op exam: completed today, he is a good surgical candidate.      No orders of the defined types were placed in this enco

## 2018-12-13 ENCOUNTER — OFFICE VISIT (OUTPATIENT)
Dept: FAMILY MEDICINE CLINIC | Facility: CLINIC | Age: 58
End: 2018-12-13
Payer: COMMERCIAL

## 2018-12-13 VITALS
TEMPERATURE: 99 F | HEIGHT: 71 IN | DIASTOLIC BLOOD PRESSURE: 78 MMHG | HEART RATE: 84 BPM | SYSTOLIC BLOOD PRESSURE: 130 MMHG | BODY MASS INDEX: 31.64 KG/M2 | WEIGHT: 226 LBS | RESPIRATION RATE: 16 BRPM

## 2018-12-13 DIAGNOSIS — G47.8 ABNORMAL REM SLEEP: ICD-10-CM

## 2018-12-13 DIAGNOSIS — G47.33 OSA (OBSTRUCTIVE SLEEP APNEA): Primary | ICD-10-CM

## 2018-12-13 PROCEDURE — 99214 OFFICE O/P EST MOD 30 MIN: CPT | Performed by: FAMILY MEDICINE

## 2018-12-13 RX ORDER — ZOLPIDEM TARTRATE 6.25 MG/1
6.25 TABLET, FILM COATED, EXTENDED RELEASE ORAL NIGHTLY PRN
Qty: 30 TABLET | Refills: 1 | Status: SHIPPED | OUTPATIENT
Start: 2018-12-13 | End: 2019-01-21 | Stop reason: ALTCHOICE

## 2018-12-13 NOTE — PROGRESS NOTES
[facility]        HPI:   This is a 62year old male coming in for Patient presents with:  Consult: Sleep Consult      HPI:  Had a sleep study a month ago and then he is here to follow up. Reviewd sleep study.   Has a 11/07/2017         Past Medical History:   Diagnosis Date   • Acute bronchitis    • Bowel obstruction [facility]) March 2015   • Calculus of kidney    • Cellulitis    • Conjunctivitis    • [facility]    left arm, after bowel obstruction, med DIARRHEA  Norco [Hydrocodone-*    NAUSEA ONLY    Comment:headaches  Current Meds:    Current Outpatient Medications:  BuPROPion HCl ER, XL, 300 MG Oral Tablet 24 Hr Take 1 tablet (300 mg total) by mouth daily.  Take with 150 mg tab to equal 450 mg daily (Pa 130/78  12/03/18 : 122/88  10/17/18 : 122/78    Ideal body weight: 166 lb 0.1 oz  Adjusted ideal body weight: 190 lb 0.1 oz    Vital signs reviewed. Physical Exam   Constitutional: He is oriented to person, place, and time.  He appears well-developed and w week.  Recommend weight loss, and maintain and optimal BMI with Exercise 30 minutes most days of the week to target heart rate . Advised patient to change filters,masks,hoses  and tubes and equiptment on a  regular schedule.   Filters and seals shall be

## 2018-12-13 NOTE — PATIENT INSTRUCTIONS
You have been scheduled for a CPAP titration at [facility]. Please  Call the sleep center at  to review forms and receive forms to fill out in the mail. The sleep center will pre-certify your study.  If they have any diffic

## 2018-12-17 ENCOUNTER — LABORATORY ENCOUNTER (OUTPATIENT)
Dept: LAB | Facility: HOSPITAL | Age: 58
End: 2018-12-17
Attending: ORTHOPAEDIC SURGERY
Payer: COMMERCIAL

## 2018-12-17 DIAGNOSIS — M19.012 OSTEOARTHRITIS OF GLENOHUMERAL JOINT, LEFT: ICD-10-CM

## 2018-12-17 PROCEDURE — 93010 ELECTROCARDIOGRAM REPORT: CPT | Performed by: INTERNAL MEDICINE

## 2018-12-17 PROCEDURE — 86905 BLOOD TYPING RBC ANTIGENS: CPT

## 2018-12-17 PROCEDURE — 87081 CULTURE SCREEN ONLY: CPT

## 2018-12-17 PROCEDURE — 86900 BLOOD TYPING SEROLOGIC ABO: CPT

## 2018-12-17 PROCEDURE — 85610 PROTHROMBIN TIME: CPT

## 2018-12-17 PROCEDURE — 81003 URINALYSIS AUTO W/O SCOPE: CPT

## 2018-12-17 PROCEDURE — 86901 BLOOD TYPING SEROLOGIC RH(D): CPT

## 2018-12-17 PROCEDURE — 36415 COLL VENOUS BLD VENIPUNCTURE: CPT

## 2018-12-17 PROCEDURE — 85025 COMPLETE CBC W/AUTO DIFF WBC: CPT

## 2018-12-17 PROCEDURE — 85730 THROMBOPLASTIN TIME PARTIAL: CPT

## 2018-12-17 PROCEDURE — 80053 COMPREHEN METABOLIC PANEL: CPT

## 2018-12-17 PROCEDURE — 93005 ELECTROCARDIOGRAM TRACING: CPT

## 2018-12-17 PROCEDURE — 86850 RBC ANTIBODY SCREEN: CPT

## 2018-12-17 PROCEDURE — 86870 RBC ANTIBODY IDENTIFICATION: CPT

## 2018-12-19 ENCOUNTER — ANESTHESIA EVENT (OUTPATIENT)
Dept: SURGERY | Facility: HOSPITAL | Age: 58
DRG: 483 | End: 2018-12-19
Payer: COMMERCIAL

## 2018-12-19 NOTE — H&P
Office Visit     12/19/2018  [address]      Anabel Lopez MD   SURGERY, ORTHOPEDIC   Arthritis of left glenohumeral joint   Dx   Shoulder Pain     Reason for Visit           Reason for Visit     Shoulder Pain left sh Procedure Laterality Date   • APPENDECTOMY         rupture   • CHOLECYSTECTOMY   10/2003   • EXTREMITY LOWER IRRIGATION & DEBRIDEMENT Right 8/5/2015     Performed by Sugar Nam MD at [facility] MAIN OR   • FOREARM/WRIST SURGERY UNLISTED   1999     carpal tu Axillary view was taken today to assess the bulge. There is adequate bone stock for a glenoid component and there is no significant posterior erosions e for pain with motion of the left shoulder.   He has ability to almost reach the back of his head and he

## 2018-12-20 ENCOUNTER — HOSPITAL ENCOUNTER (INPATIENT)
Facility: HOSPITAL | Age: 58
LOS: 2 days | Discharge: HOME HEALTH | DRG: 483 | End: 2018-12-22
Attending: ORTHOPAEDIC SURGERY | Admitting: ORTHOPAEDIC SURGERY
Payer: COMMERCIAL

## 2018-12-20 ENCOUNTER — APPOINTMENT (OUTPATIENT)
Dept: GENERAL RADIOLOGY | Facility: HOSPITAL | Age: 58
DRG: 483 | End: 2018-12-20
Attending: ORTHOPAEDIC SURGERY
Payer: COMMERCIAL

## 2018-12-20 ENCOUNTER — ANESTHESIA (OUTPATIENT)
Dept: SURGERY | Facility: HOSPITAL | Age: 58
DRG: 483 | End: 2018-12-20
Payer: COMMERCIAL

## 2018-12-20 DIAGNOSIS — M19.012 OSTEOARTHRITIS OF GLENOHUMERAL JOINT, LEFT: Primary | ICD-10-CM

## 2018-12-20 PROCEDURE — 73020 X-RAY EXAM OF SHOULDER: CPT | Performed by: ORTHOPAEDIC SURGERY

## 2018-12-20 PROCEDURE — 88305 TISSUE EXAM BY PATHOLOGIST: CPT | Performed by: ORTHOPAEDIC SURGERY

## 2018-12-20 PROCEDURE — 85520 HEPARIN ASSAY: CPT | Performed by: ORTHOPAEDIC SURGERY

## 2018-12-20 PROCEDURE — 0LS40ZZ REPOSITION LEFT UPPER ARM TENDON, OPEN APPROACH: ICD-10-PCS | Performed by: ORTHOPAEDIC SURGERY

## 2018-12-20 PROCEDURE — 3E0T3BZ INTRODUCTION OF ANESTHETIC AGENT INTO PERIPHERAL NERVES AND PLEXI, PERCUTANEOUS APPROACH: ICD-10-PCS | Performed by: ANESTHESIOLOGY

## 2018-12-20 PROCEDURE — 0RRK0JZ REPLACEMENT OF LEFT SHOULDER JOINT WITH SYNTHETIC SUBSTITUTE, OPEN APPROACH: ICD-10-PCS | Performed by: ORTHOPAEDIC SURGERY

## 2018-12-20 PROCEDURE — 5A09357 ASSISTANCE WITH RESPIRATORY VENTILATION, LESS THAN 24 CONSECUTIVE HOURS, CONTINUOUS POSITIVE AIRWAY PRESSURE: ICD-10-PCS | Performed by: ORTHOPAEDIC SURGERY

## 2018-12-20 PROCEDURE — 76942 ECHO GUIDE FOR BIOPSY: CPT | Performed by: ORTHOPAEDIC SURGERY

## 2018-12-20 PROCEDURE — 88311 DECALCIFY TISSUE: CPT | Performed by: ORTHOPAEDIC SURGERY

## 2018-12-20 DEVICE — ATTUNE PINNING SYSTEM
Type: IMPLANTABLE DEVICE | Site: SHOULDER | Status: FUNCTIONAL
Brand: ATTUNE

## 2018-12-20 DEVICE — GLOBAL UNITE POROCOAT STANDARD STEM SIZE 10 113MM
Type: IMPLANTABLE DEVICE | Site: SHOULDER | Status: FUNCTIONAL
Brand: GLOBAL UNITE

## 2018-12-20 DEVICE — GLOBAL ANCHOR PEG GLENOID PREMIERON X-LINKED PE SIZE 52MM
Type: IMPLANTABLE DEVICE | Site: SHOULDER | Status: FUNCTIONAL
Brand: GLOBAL

## 2018-12-20 DEVICE — GLOBAL UNITE ANATOMIC PROXIMAL BODY 135 DEGREE SIZE 10 POROCOAT
Type: IMPLANTABLE DEVICE | Site: SHOULDER | Status: FUNCTIONAL
Brand: GLOBAL UNITE

## 2018-12-20 RX ORDER — SODIUM CHLORIDE, SODIUM LACTATE, POTASSIUM CHLORIDE, CALCIUM CHLORIDE 600; 310; 30; 20 MG/100ML; MG/100ML; MG/100ML; MG/100ML
INJECTION, SOLUTION INTRAVENOUS CONTINUOUS
Status: DISCONTINUED | OUTPATIENT
Start: 2018-12-20 | End: 2018-12-22

## 2018-12-20 RX ORDER — ONDANSETRON 2 MG/ML
4 INJECTION INTRAMUSCULAR; INTRAVENOUS EVERY 4 HOURS PRN
Status: DISCONTINUED | OUTPATIENT
Start: 2018-12-20 | End: 2018-12-22

## 2018-12-20 RX ORDER — NALOXONE HYDROCHLORIDE 0.4 MG/ML
80 INJECTION, SOLUTION INTRAMUSCULAR; INTRAVENOUS; SUBCUTANEOUS AS NEEDED
Status: DISCONTINUED | OUTPATIENT
Start: 2018-12-20 | End: 2018-12-20 | Stop reason: HOSPADM

## 2018-12-20 RX ORDER — CEFAZOLIN SODIUM/WATER 2 G/20 ML
2 SYRINGE (ML) INTRAVENOUS EVERY 8 HOURS
Status: COMPLETED | OUTPATIENT
Start: 2018-12-20 | End: 2018-12-21

## 2018-12-20 RX ORDER — NALOXONE HYDROCHLORIDE 0.4 MG/ML
80 INJECTION, SOLUTION INTRAMUSCULAR; INTRAVENOUS; SUBCUTANEOUS AS NEEDED
Status: ACTIVE | OUTPATIENT
Start: 2018-12-20 | End: 2018-12-21

## 2018-12-20 RX ORDER — OXYCODONE HCL 10 MG/1
10 TABLET, FILM COATED, EXTENDED RELEASE ORAL
Status: COMPLETED | OUTPATIENT
Start: 2018-12-20 | End: 2018-12-20

## 2018-12-20 RX ORDER — DIPHENHYDRAMINE HYDROCHLORIDE 50 MG/ML
12.5 INJECTION INTRAMUSCULAR; INTRAVENOUS AS NEEDED
Status: DISCONTINUED | OUTPATIENT
Start: 2018-12-20 | End: 2018-12-20 | Stop reason: HOSPADM

## 2018-12-20 RX ORDER — HYDROMORPHONE HYDROCHLORIDE 1 MG/ML
0.4 INJECTION, SOLUTION INTRAMUSCULAR; INTRAVENOUS; SUBCUTANEOUS EVERY 2 HOUR PRN
Status: DISCONTINUED | OUTPATIENT
Start: 2018-12-20 | End: 2018-12-22

## 2018-12-20 RX ORDER — HYDROMORPHONE HYDROCHLORIDE 1 MG/ML
0.2 INJECTION, SOLUTION INTRAMUSCULAR; INTRAVENOUS; SUBCUTANEOUS EVERY 2 HOUR PRN
Status: DISCONTINUED | OUTPATIENT
Start: 2018-12-20 | End: 2018-12-22

## 2018-12-20 RX ORDER — BUPROPION HYDROCHLORIDE 150 MG/1
150 TABLET, EXTENDED RELEASE ORAL DAILY
Status: DISCONTINUED | OUTPATIENT
Start: 2018-12-20 | End: 2018-12-22

## 2018-12-20 RX ORDER — DOCUSATE SODIUM 100 MG/1
100 CAPSULE, LIQUID FILLED ORAL 2 TIMES DAILY
Status: DISCONTINUED | OUTPATIENT
Start: 2018-12-20 | End: 2018-12-22

## 2018-12-20 RX ORDER — METOCLOPRAMIDE HYDROCHLORIDE 5 MG/ML
INJECTION INTRAMUSCULAR; INTRAVENOUS
Status: COMPLETED
Start: 2018-12-20 | End: 2018-12-20

## 2018-12-20 RX ORDER — HYDROMORPHONE HYDROCHLORIDE 1 MG/ML
0.8 INJECTION, SOLUTION INTRAMUSCULAR; INTRAVENOUS; SUBCUTANEOUS EVERY 2 HOUR PRN
Status: DISCONTINUED | OUTPATIENT
Start: 2018-12-20 | End: 2018-12-22

## 2018-12-20 RX ORDER — SCOLOPAMINE TRANSDERMAL SYSTEM 1 MG/1
PATCH, EXTENDED RELEASE TRANSDERMAL
Status: DISCONTINUED
Start: 2018-12-20 | End: 2018-12-22

## 2018-12-20 RX ORDER — HYDROMORPHONE HYDROCHLORIDE 1 MG/ML
0.4 INJECTION, SOLUTION INTRAMUSCULAR; INTRAVENOUS; SUBCUTANEOUS EVERY 5 MIN PRN
Status: DISCONTINUED | OUTPATIENT
Start: 2018-12-20 | End: 2018-12-20 | Stop reason: HOSPADM

## 2018-12-20 RX ORDER — BUPROPION HYDROCHLORIDE 300 MG/1
300 TABLET ORAL DAILY
COMMUNITY
End: 2019-02-26 | Stop reason: ALTCHOICE

## 2018-12-20 RX ORDER — SODIUM PHOSPHATE, DIBASIC AND SODIUM PHOSPHATE, MONOBASIC 7; 19 G/133ML; G/133ML
1 ENEMA RECTAL ONCE AS NEEDED
Status: DISCONTINUED | OUTPATIENT
Start: 2018-12-20 | End: 2018-12-22

## 2018-12-20 RX ORDER — ESCITALOPRAM OXALATE 20 MG/1
20 TABLET ORAL NIGHTLY
Status: DISCONTINUED | OUTPATIENT
Start: 2018-12-20 | End: 2018-12-22

## 2018-12-20 RX ORDER — ACETAMINOPHEN 500 MG
1000 TABLET ORAL ONCE
Status: DISCONTINUED | OUTPATIENT
Start: 2018-12-20 | End: 2018-12-22

## 2018-12-20 RX ORDER — CEFAZOLIN SODIUM/WATER 2 G/20 ML
2 SYRINGE (ML) INTRAVENOUS ONCE
Status: COMPLETED | OUTPATIENT
Start: 2018-12-20 | End: 2018-12-20

## 2018-12-20 RX ORDER — MIDAZOLAM HYDROCHLORIDE 1 MG/ML
1 INJECTION INTRAMUSCULAR; INTRAVENOUS EVERY 5 MIN PRN
Status: DISCONTINUED | OUTPATIENT
Start: 2018-12-20 | End: 2018-12-20 | Stop reason: HOSPADM

## 2018-12-20 RX ORDER — ACETAMINOPHEN 325 MG/1
TABLET ORAL
Status: COMPLETED
Start: 2018-12-20 | End: 2018-12-20

## 2018-12-20 RX ORDER — METOCLOPRAMIDE HYDROCHLORIDE 5 MG/ML
10 INJECTION INTRAMUSCULAR; INTRAVENOUS AS NEEDED
Status: DISCONTINUED | OUTPATIENT
Start: 2018-12-20 | End: 2018-12-20 | Stop reason: HOSPADM

## 2018-12-20 RX ORDER — OXYCODONE HYDROCHLORIDE 10 MG/1
10 TABLET ORAL EVERY 4 HOURS PRN
Status: DISCONTINUED | OUTPATIENT
Start: 2018-12-20 | End: 2018-12-21

## 2018-12-20 RX ORDER — CEFAZOLIN SODIUM/WATER 2 G/20 ML
SYRINGE (ML) INTRAVENOUS
Status: DISPENSED
Start: 2018-12-20 | End: 2018-12-20

## 2018-12-20 RX ORDER — OXYCODONE HYDROCHLORIDE 15 MG/1
15 TABLET ORAL EVERY 4 HOURS PRN
Status: DISCONTINUED | OUTPATIENT
Start: 2018-12-20 | End: 2018-12-21

## 2018-12-20 RX ORDER — KETOROLAC TROMETHAMINE 30 MG/ML
30 INJECTION, SOLUTION INTRAMUSCULAR; INTRAVENOUS EVERY 6 HOURS
Status: COMPLETED | OUTPATIENT
Start: 2018-12-20 | End: 2018-12-21

## 2018-12-20 RX ORDER — ENOXAPARIN SODIUM 100 MG/ML
40 INJECTION SUBCUTANEOUS DAILY
Status: DISCONTINUED | OUTPATIENT
Start: 2018-12-21 | End: 2018-12-22

## 2018-12-20 RX ORDER — ONDANSETRON 2 MG/ML
4 INJECTION INTRAMUSCULAR; INTRAVENOUS AS NEEDED
Status: DISCONTINUED | OUTPATIENT
Start: 2018-12-20 | End: 2018-12-20 | Stop reason: HOSPADM

## 2018-12-20 RX ORDER — BISACODYL 10 MG
10 SUPPOSITORY, RECTAL RECTAL
Status: DISCONTINUED | OUTPATIENT
Start: 2018-12-20 | End: 2018-12-22

## 2018-12-20 RX ORDER — SCOLOPAMINE TRANSDERMAL SYSTEM 1 MG/1
1 PATCH, EXTENDED RELEASE TRANSDERMAL ONCE
Status: DISCONTINUED | OUTPATIENT
Start: 2018-12-20 | End: 2018-12-22

## 2018-12-20 RX ORDER — DIPHENHYDRAMINE HYDROCHLORIDE 50 MG/ML
25 INJECTION INTRAMUSCULAR; INTRAVENOUS ONCE AS NEEDED
Status: ACTIVE | OUTPATIENT
Start: 2018-12-20 | End: 2018-12-20

## 2018-12-20 RX ORDER — POLYETHYLENE GLYCOL 3350 17 G/17G
17 POWDER, FOR SOLUTION ORAL DAILY PRN
Status: DISCONTINUED | OUTPATIENT
Start: 2018-12-20 | End: 2018-12-22

## 2018-12-20 RX ORDER — ACETAMINOPHEN 325 MG/1
650 TABLET ORAL 4 TIMES DAILY
Status: DISCONTINUED | OUTPATIENT
Start: 2018-12-20 | End: 2018-12-21

## 2018-12-20 RX ORDER — METOCLOPRAMIDE HYDROCHLORIDE 5 MG/ML
10 INJECTION INTRAMUSCULAR; INTRAVENOUS EVERY 6 HOURS PRN
Status: DISCONTINUED | OUTPATIENT
Start: 2018-12-20 | End: 2018-12-22

## 2018-12-20 RX ORDER — SODIUM CHLORIDE, SODIUM LACTATE, POTASSIUM CHLORIDE, CALCIUM CHLORIDE 600; 310; 30; 20 MG/100ML; MG/100ML; MG/100ML; MG/100ML
INJECTION, SOLUTION INTRAVENOUS CONTINUOUS
Status: DISCONTINUED | OUTPATIENT
Start: 2018-12-20 | End: 2018-12-20 | Stop reason: HOSPADM

## 2018-12-20 RX ORDER — OXYCODONE HCL 10 MG/1
TABLET, FILM COATED, EXTENDED RELEASE ORAL
Status: COMPLETED
Start: 2018-12-20 | End: 2018-12-20

## 2018-12-20 RX ORDER — ONDANSETRON 2 MG/ML
INJECTION INTRAMUSCULAR; INTRAVENOUS
Status: DISCONTINUED
Start: 2018-12-20 | End: 2018-12-20 | Stop reason: WASHOUT

## 2018-12-20 RX ORDER — BACITRACIN 50000 [USP'U]/1
INJECTION, POWDER, LYOPHILIZED, FOR SOLUTION INTRAMUSCULAR AS NEEDED
Status: DISCONTINUED | OUTPATIENT
Start: 2018-12-20 | End: 2018-12-20 | Stop reason: HOSPADM

## 2018-12-20 RX ORDER — ACETAMINOPHEN 325 MG/1
650 TABLET ORAL ONCE
Status: COMPLETED | OUTPATIENT
Start: 2018-12-20 | End: 2018-12-20

## 2018-12-20 RX ORDER — ZOLPIDEM TARTRATE 5 MG/1
5 TABLET ORAL NIGHTLY PRN
Status: DISCONTINUED | OUTPATIENT
Start: 2018-12-20 | End: 2018-12-22

## 2018-12-20 RX ORDER — OXYCODONE HYDROCHLORIDE 5 MG/1
5 TABLET ORAL EVERY 4 HOURS PRN
Status: DISCONTINUED | OUTPATIENT
Start: 2018-12-20 | End: 2018-12-21

## 2018-12-20 RX ORDER — TIZANIDINE 4 MG/1
4 TABLET ORAL 3 TIMES DAILY PRN
Status: DISCONTINUED | OUTPATIENT
Start: 2018-12-20 | End: 2018-12-22

## 2018-12-20 RX ORDER — MEPERIDINE HYDROCHLORIDE 25 MG/ML
12.5 INJECTION INTRAMUSCULAR; INTRAVENOUS; SUBCUTANEOUS AS NEEDED
Status: DISCONTINUED | OUTPATIENT
Start: 2018-12-20 | End: 2018-12-20 | Stop reason: HOSPADM

## 2018-12-20 NOTE — ANESTHESIA PREPROCEDURE EVALUATION
PRE-OP EVALUATION    Patient Name: [facility]    Pre-op Diagnosis: Osteoarthritis of glenohumeral joint, left [M19.012]    Procedure(s):  LEFT TOTAL SHOULDER REPLACEMENT    Surgeon(s) and Role:     Moses Levin MD - Primary    Pre-op virgilio Sparks Laterality Date   • APPENDECTOMY      rupture   • CHOLECYSTECTOMY  10/2003   • EXTREMITY LOWER IRRIGATION & DEBRIDEMENT Right 8/5/2015    Performed by Sugar Nam MD at [facility] MAIN OR   • FOREARM/WRIST SURGERY UNLISTED Bilateral 1999    carpal tunnel R w explained including but not limited to: nerve damage, neuropathy, bleeding, infection, ipsilateral phrenic block, facial droop     Plan/risks discussed with: patient, spouse and father            Pt has allergy to brand name Floydene Fannin - generic okay.  Does not

## 2018-12-20 NOTE — PAYOR COMM NOTE
--------------  ADMISSION REVIEW     Payor: Pramod GABRIEL  Subscriber #:  [id number]  Authorization Number: [id number]    Admit date: N/A  Admit time: N/A       Admitting Physician: Edwar Farley MD  Attending Physician:  Edwar Farley MD 10/2003   • EXTREMITY LOWER IRRIGATION & DEBRIDEMENT Right 8/5/2015     Performed by Beth Kulkarni MD at [facility] MAIN OR   • FOREARM/WRIST SURGERY UNLISTED   1999     carpal tunnel R wrist, l wrist   • KNEE TOTAL REPLACEMENT Right 9/19/2018     Performed by limitations in motion and constant pain. At this point he is scheduled for a total shoulder arthroplasty. Procedure alternatives risk discussed.   Questions encouraged and answered        MEDICATIONS ADMINISTERED IN LAST 1 DAY:  bacitracin (BACIIM) inject

## 2018-12-20 NOTE — BRIEF OP NOTE
Pre-Operative Diagnosis: Osteoarthritis of glenohumeral joint, left [M19.012]     Post-Operative Diagnosis: Osteoarthritis of glenohumeral joint, left [M19.012]      Procedure Performed:   Procedure(s):  LEFT TOTAL SHOULDER REPLACEMENT    Surgeon(s) and Jesusita

## 2018-12-20 NOTE — ANESTHESIA POSTPROCEDURE EVALUATION
[address] Patient Status:  Surgery Admit   Age/Gender 62year old male MRN [id number]   [facility] SURGERY Attending Valente Carney MD   Hosp Day # 0 PCP Harjinder Mccormick DO       Anesthesia Post-op Note    Procedure(s)

## 2018-12-21 PROCEDURE — 85027 COMPLETE CBC AUTOMATED: CPT | Performed by: ORTHOPAEDIC SURGERY

## 2018-12-21 PROCEDURE — 97162 PT EVAL MOD COMPLEX 30 MIN: CPT

## 2018-12-21 PROCEDURE — 97535 SELF CARE MNGMENT TRAINING: CPT

## 2018-12-21 PROCEDURE — 97116 GAIT TRAINING THERAPY: CPT

## 2018-12-21 PROCEDURE — 97165 OT EVAL LOW COMPLEX 30 MIN: CPT

## 2018-12-21 PROCEDURE — 94660 CPAP INITIATION&MGMT: CPT

## 2018-12-21 PROCEDURE — 97110 THERAPEUTIC EXERCISES: CPT

## 2018-12-21 RX ORDER — HYDROCODONE BITARTRATE AND ACETAMINOPHEN 10; 325 MG/1; MG/1
1 TABLET ORAL EVERY 4 HOURS PRN
Status: DISCONTINUED | OUTPATIENT
Start: 2018-12-21 | End: 2018-12-22

## 2018-12-21 RX ORDER — HYDROCODONE BITARTRATE AND ACETAMINOPHEN 10; 325 MG/1; MG/1
2 TABLET ORAL EVERY 4 HOURS PRN
Status: DISCONTINUED | OUTPATIENT
Start: 2018-12-21 | End: 2018-12-22

## 2018-12-21 RX ORDER — HYDROCODONE BITARTRATE AND ACETAMINOPHEN 10; 325 MG/1; MG/1
1-2 TABLET ORAL EVERY 6 HOURS PRN
Qty: 40 TABLET | Refills: 0 | Status: SHIPPED | OUTPATIENT
Start: 2018-12-21 | End: 2018-12-22

## 2018-12-21 NOTE — RESPIRATORY THERAPY NOTE
ALTON : EQUIPMENT USE: DAILY SUMMARY                                            SET MODE: CPAP WITH CFLEX                                          USAGE IN HOURS: 5:46                                          90% EXP

## 2018-12-21 NOTE — PROGRESS NOTES
Comfortable    /65 (BP Location: Right arm)   Pulse 87   Temp 98.2 °F (36.8 °C) (Oral)   Resp 16   Ht 5' 11\" (1.803 m)   Wt 218 lb 4.1 oz (99 kg)   SpO2 95%   BMI 30.44 kg/m²       Block still in effect    Lab Results   Component Value Date    WBC 9

## 2018-12-21 NOTE — PROGRESS NOTES
Post Op Day 1 Ortho Note    Status Post Nerve Block:  Type of Nerve Block: Left Interscalene  Single Injection Nerve Block    Post op review: No evidence of immediate block related complications, No paresthesia noted, Able to plantar flex foot, Able to asad

## 2018-12-21 NOTE — HOME CARE LIAISON
MET WITH PTNT TO DISCUSS HOME HEALTH SERVICES AND COVERAGE CRITERIA. PTNT AGREEABLE TO Teddy Cooper. PTNT GIVEN RESIDENTIAL BROCHURE. RESIDENTIAL WITH PROVIDE SN/PT ON DISCHARGE.     Thank you for this referral,   Verona Romberg

## 2018-12-21 NOTE — PHYSICAL THERAPY NOTE
PHYSICAL THERAPY QUICK EVALUATION - INPATIENT    Room Number: 357/357-A  Evaluation Date: 12/21/2018  Presenting Problem: s/p left TSA (12/20/18)  Physician Order: PT Eval and Treat    Problem List  Active Problems:    Osteoarthritis of glenohumeral join able to progress to independence without assistive device for all functional mobilities (following right TKA on 9/19/18).  Pt states that he has frequent falls; 6 falls within the past 3 weeks secondary to tripping over items he didn't know were on the floo Comment;L Foot flat;R Foot flat(wide base of support)  Stoop/Curb Assistance: Other (Comment)(4 steps with right railing ascending, modified independent)       Skilled Therapy Provided: Session approved by RN.  Pt received sitting in bedside chair; agreeabl if a new functional limitation presents during this admission. GOALS  Patient was able to achieve the following goals . ..     Patient was able to transfer Safely and independently   Patient able to ambulate on level surfaces Safely and independently

## 2018-12-21 NOTE — OCCUPATIONAL THERAPY NOTE
OCCUPATIONAL THERAPY QUICK EVALUATION - INPATIENT    Room Number: 357/357-A  Evaluation Date: 12/21/2018     Type of Evaluation: Initial  Presenting Problem: s/p L TSA 12/2018    Physician Order: IP Consult to Occupational Therapy  Reason for Therapy:  ADL • TOTAL KNEE REPLACEMENT Right    • VASECTOMY  1999       OCCUPATIONAL PROFILE    HOME SITUATION  Type of Home: House  Home Layout: Two level  Lives With: Spouse; Son    Toilet and Equipment: Standard height toilet  Shower/Tub and Equipment: Walk-in showe brushing teeth?: None  -   Eating meals?: None    AM-PAC Score:  Score: 20  Approx Degree of Impairment: 38.32%  Standardized Score (AM-PAC Scale): 42.03  CMS Modifier (G-Code): ALEXANDRA    FUNCTIONAL TRANSFER ASSESSMENT  Supine to Sit : Supervision  Sit to  Company comorbidities nor modifications of tasks    Clinical Decision Making  LOW - Analysis of occupational profile, problem-focused assessments, limited treatment options    Overall Complexity  LOW     OT Discharge Recommendations: Home  OT Device Recommendation

## 2018-12-21 NOTE — CM/SW NOTE
12/21/18 1200   CM/SW Referral Data   Referral Source Physician   Reason for Referral Discharge planning   Informant Patient;Edward Staff   Pertinent Medical Hx   Primary Care Physician Name SKIP Malik Min, DO   Patient Info   Patient's Mental Status Alert;O

## 2018-12-21 NOTE — PAYOR COMM NOTE
--------------  CONTINUED STAY REVIEW    Sarita Barrera [employer] PPO  Subscriber #:  [id number]  Authorization Number: [id number]    12/21 POD # 1    /65 (BP Location: Right arm)   Pulse 87   Temp 98.2 °F (36.8 °C) (Oral)   Resp 16      Block sti tab 20 mg     Date Action Dose Route User    12/20/2018 2220 Given 20 mg Oral Raul Cooper RN      ketorolac tromethamine (TORADOL) 30 MG/ML injection 30 mg     Date Action Dose Route User    12/21/2018 0831 Given 30 mg Intravenous Aris Gu RN

## 2018-12-22 VITALS
DIASTOLIC BLOOD PRESSURE: 62 MMHG | SYSTOLIC BLOOD PRESSURE: 110 MMHG | RESPIRATION RATE: 18 BRPM | TEMPERATURE: 98 F | BODY MASS INDEX: 30.56 KG/M2 | WEIGHT: 218.25 LBS | OXYGEN SATURATION: 92 % | HEART RATE: 81 BPM | HEIGHT: 71 IN

## 2018-12-22 PROCEDURE — 85027 COMPLETE CBC AUTOMATED: CPT | Performed by: ORTHOPAEDIC SURGERY

## 2018-12-22 RX ORDER — ACETAMINOPHEN AND CODEINE PHOSPHATE 300; 30 MG/1; MG/1
1 TABLET ORAL EVERY 4 HOURS PRN
Status: DISCONTINUED | OUTPATIENT
Start: 2018-12-22 | End: 2018-12-22

## 2018-12-22 RX ORDER — ACETAMINOPHEN AND CODEINE PHOSPHATE 300; 30 MG/1; MG/1
1-2 TABLET ORAL EVERY 4 HOURS PRN
Qty: 50 TABLET | Refills: 0 | Status: SHIPPED | OUTPATIENT
Start: 2018-12-22 | End: 2019-01-21 | Stop reason: ALTCHOICE

## 2018-12-22 RX ORDER — ACETAMINOPHEN AND CODEINE PHOSPHATE 300; 30 MG/1; MG/1
2 TABLET ORAL EVERY 4 HOURS PRN
Status: DISCONTINUED | OUTPATIENT
Start: 2018-12-22 | End: 2018-12-22

## 2018-12-22 NOTE — RESPIRATORY THERAPY NOTE
ALTON : EQUIPMENT USE: DAILY SUMMARY                                            SET MODE:  CPAP WITH CFLEX                                          USAGE IN HOURS: 4:27                                          90% EX

## 2018-12-22 NOTE — OPERATIVE REPORT
[address]    PATIENT'S NAME: [facility]   ATTENDING PHYSICIAN: Annette Hunt M.D. OPERATING PHYSICIAN: Annette Hunt M.D.    PATIENT ACCOUNT#:   [de-identified]    LOCATION:  [address] [place] [address]  MEDICAL RECORD #:   [id number]       DATE OF BIRTH: template for the superior and inferior pegs was then placed. The pin was withdrawn and the size 52 pegged glenoid was placed after first placing bone graft around the central peg with the system. The humerus was then again delivered into the wound.   The

## 2018-12-22 NOTE — PROGRESS NOTES
[address] Patient Status:  Inpatient    12/15/1960 MRN [id number]   [facility] 3SW-A Attending Amie Bradley MD   Hosp Day # 2 PCP Julián Copeland DO     Subjective:  S/P LEFT Total shoulder Arthroplasty  Systemic

## 2018-12-22 NOTE — CM/SW NOTE
SW spoke to RN and pt is ready for discharge. SW confirmed with [facility] that pt has been accepted. Maria Guadalupe Landry aware. Social work to remain available for support or any discharge planning needs.       Ragena Primrose MSW, LCSW   for [employer]

## 2018-12-24 ENCOUNTER — HOSPITAL ENCOUNTER (EMERGENCY)
Facility: HOSPITAL | Age: 58
Discharge: HOME | End: 2018-12-24
Attending: EMERGENCY MEDICINE
Payer: COMMERCIAL

## 2018-12-24 VITALS
HEIGHT: 71 IN | DIASTOLIC BLOOD PRESSURE: 99 MMHG | TEMPERATURE: 100 F | BODY MASS INDEX: 30.1 KG/M2 | SYSTOLIC BLOOD PRESSURE: 110 MMHG | OXYGEN SATURATION: 97 % | WEIGHT: 215 LBS | RESPIRATION RATE: 15 BRPM | HEART RATE: 93 BPM

## 2018-12-24 DIAGNOSIS — R25.2 MUSCLE CRAMPS: Primary | ICD-10-CM

## 2018-12-24 PROCEDURE — 96360 HYDRATION IV INFUSION INIT: CPT

## 2018-12-24 PROCEDURE — 80048 BASIC METABOLIC PNL TOTAL CA: CPT | Performed by: EMERGENCY MEDICINE

## 2018-12-24 PROCEDURE — 99284 EMERGENCY DEPT VISIT MOD MDM: CPT

## 2018-12-25 NOTE — ED PROVIDER NOTES
Patient Seen in: [facility] Emergency Department    History   Patient presents with:  Postop/Procedure Problem    Stated Complaint: pt drove self here; states body aches after left shoulder surg on thursday    HPI    60-year-old male who presents to HISTORY      partial colectomy   • SHOULDER TOTAL Left 12/20/2018    Performed by Basilio Solomon MD at [address]  3/2011    cervical vertebral fusion   • TOTAL KNEE REPLACEMENT Right    • VASECTOMY  1999           So edema or clubbing. Pulses are +2. Full range of motion is noted of the extremities without deformities. No tenderness. Neurologically intact. Left arm in a sling from previous surgery.        ED Course     Labs Reviewed   BASIC METABOLIC PANEL (8) - No

## 2018-12-25 NOTE — ED INITIAL ASSESSMENT (HPI)
Pt to ED with complaints of \"cramping\" to abdomen, thighs, right arm since this afternoon. Pt denies any N/V/D.

## 2018-12-26 NOTE — PAYOR COMM NOTE
--------------  DISCHARGE REVIEW    Payor: [facility] [employer] PPO  Subscriber #:  [id number]  Authorization Number: [id number]    Admit date: 12/20/18  Admit time:  1853  Discharge Date: 12/22/2018  4:49 PM     Admitting Physician: Cary Rock MD  A

## 2018-12-26 NOTE — DISCHARGE SUMMARY
[facility]  Discharge Summary    Georgina Hassan Patient Status:  Inpatient    12/15/1960 MRN [id number]   [facility] 3SW-A Attending No att. providers found   Hosp Day # 2 PCP Pradeep Pillai DO     Date of Admission: 2018    Date XL, 300 MG Oral Tablet 24 Hr  Take 300 mg by mouth daily. , Historical    Citalopram Hydrobromide 40 MG Oral Tab  Take 1 tablet (40 mg total) by mouth daily. , Normal, Disp-90 tablet, R-1    Zolpidem Tartrate ER (AMBIEN CR) 6.25 MG Oral Tab CR  Take 1 tablet

## 2018-12-26 NOTE — PAYOR COMM NOTE
--------------  CONTINUED STAY REVIEW    Payor: Sirisha Viktoriya [employer] PPO  Subscriber #:  [id number]  Authorization Number: [id number]    Admit date: 12/20/18  Admit time: 8789    Admitting Physician: Eri Murphy MD  Attending Physician:  Angelica Pereira

## 2019-01-16 ENCOUNTER — OFFICE VISIT (OUTPATIENT)
Dept: NEUROLOGY | Facility: CLINIC | Age: 59
End: 2019-01-16
Payer: COMMERCIAL

## 2019-01-16 VITALS
RESPIRATION RATE: 16 BRPM | BODY MASS INDEX: 32 KG/M2 | WEIGHT: 227 LBS | DIASTOLIC BLOOD PRESSURE: 78 MMHG | SYSTOLIC BLOOD PRESSURE: 128 MMHG | HEART RATE: 80 BPM

## 2019-01-16 DIAGNOSIS — R25.1 TREMOR: Primary | ICD-10-CM

## 2019-01-16 PROCEDURE — 99213 OFFICE O/P EST LOW 20 MIN: CPT | Performed by: OTHER

## 2019-01-16 NOTE — PROGRESS NOTES
Neurology H&P    Jamal Ward Patient Status:  No patient class for patient encounter    12/15/1960 MRN [id number]   Location ED [facility] Attending No att. providers found   Hosp Day # 0 PCP Tal Mayorga DO     Subjective:  Initial Clinic H taking it) and tells me that his tremors then returned. He denies any other new numbness weakness or tingling. Reports that his previous whole body numbness has completely resolved.     Current Medications:    Current Outpatient Medications:  Cyclobenzaprin cardiovascular examination    • Preoperative examination, unspecified    • Sleep apnea    • Tinea pedis    • Vertigo    • Visual impairment     glassses       PSHx:  Past Surgical History:   Procedure Laterality Date   • ANESTH,SHOULDER REPLACEMENT Left 12 urination or difficulty urinating   Heme: no new bruising, no unexplained fevers or chills  Endocrine: no unexplained weight loss or gain, no new fatigue  Musculoskeletal: denies back pain, denies joint pain  Psych: + depression and + anxiety  Skin: denies new compleinta      Plan:  1. Essential vs medication induced tremor  - He has no clinical signs of Parkinsons disease at this time. - We can follow this for now    2.  Neuropathy  - Exam is notable for + rhombergs but reports intact sensation to LT and p

## 2019-01-21 ENCOUNTER — HOSPITAL ENCOUNTER (OUTPATIENT)
Dept: GENERAL RADIOLOGY | Age: 59
Discharge: HOME | End: 2019-01-21
Attending: FAMILY MEDICINE
Payer: COMMERCIAL
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
## 2019-01-21 DIAGNOSIS — Z98.890 H/O SHOULDER SURGERY: ICD-10-CM

## 2019-01-21 PROCEDURE — 73030 X-RAY EXAM OF SHOULDER: CPT | Performed by: FAMILY MEDICINE

## 2019-01-21 NOTE — PROGRESS NOTES
Charles Magdaleno is a 62year old male. Patient presents with: Follow - Up: on medications      HPI:   He fell last week on the ice. Landed on his shoulder. Surgery was 4 weeks ago. Just started therapy last week. He is worried he messed it up.  He has had tablet (10 mg total) by mouth 3 (three) times daily for 20 days. Disp: 30 tablet Rfl: 0   BuPROPion HCl ER, XL, 300 MG Oral Tablet 24 Hr Take 300 mg by mouth daily.  Disp:  Rfl:       Past Medical History:   Diagnosis Date   • Acute bronchitis    • Anesthes kg/m²  Body mass index is 32.08 kg/m².       GENERAL: well developed, well nourished,in no apparent distress  SKIN: no rashes,no suspicious lesions  HEENT: atraumatic, normocephalic,ears and throat are clear  NECK: supple,no adenopathy   LUNGS: clear to [employer] in this encounter. Meds & Refills for this Visit:  Requested Prescriptions     Signed Prescriptions Disp Refills   • desvenlafaxine succinate ER 25 MG Oral Tablet 24 Hr 30 tablet 1     Sig: Take 1 tablet (25 mg total) by mouth daily.    • Cital

## 2019-01-23 ENCOUNTER — OFFICE VISIT (OUTPATIENT)
Dept: FAMILY MEDICINE CLINIC | Facility: CLINIC | Age: 59
End: 2019-01-23
Payer: COMMERCIAL

## 2019-01-23 VITALS
OXYGEN SATURATION: 98 % | RESPIRATION RATE: 18 BRPM | HEART RATE: 79 BPM | SYSTOLIC BLOOD PRESSURE: 120 MMHG | TEMPERATURE: 98 F | WEIGHT: 230 LBS | BODY MASS INDEX: 32 KG/M2 | DIASTOLIC BLOOD PRESSURE: 80 MMHG

## 2019-01-23 DIAGNOSIS — M54.50 ACUTE RIGHT-SIDED LOW BACK PAIN WITHOUT SCIATICA: Primary | ICD-10-CM

## 2019-01-23 PROCEDURE — 99213 OFFICE O/P EST LOW 20 MIN: CPT | Performed by: PHYSICIAN ASSISTANT

## 2019-01-23 NOTE — PROGRESS NOTES
Patient presents with:  Low Back Pain: x 4 days         HPI:  patient presents with lower back pain for 4 days. States he slipped and fell on ice a week ago in a parking lot. Landed backward and to the left. Recently had shoulder surgery.  Was not having an pedis    • Vertigo    • Visual impairment     glassses      Past Surgical History:   Procedure Laterality Date   • ANESTH,SHOULDER REPLACEMENT Left 12/20/18--Dr. Estelle Griffin    total shoulder arthroplasty   • APPENDECTOMY      rupture   • CHOLECYSTECTOMY  10/200 A/P    Acute right-sided low back pain without sciatica  Discussed [facility] limitations. Unable to do xray with recent fall/trauma. Patient refuses IC visit.  Will follow up with PCP   Ok to continue flexeril but at a decreased dose  Aleve OTC -patient juan

## 2019-01-23 NOTE — PATIENT INSTRUCTIONS
Moist heat   No heavy lifting/gentle stretching   Flexeril at night. 10 mg max dose.  Do not take at the same time as tylenol 3   Aleve OTC every 8 hours   Monitor for rash   Lidocaine pain patches as needed OTC   Close follow up with PCP for possible imagi

## 2019-01-25 ENCOUNTER — HOSPITAL ENCOUNTER (OUTPATIENT)
Age: 59
Discharge: HOME | End: 2019-01-25
Payer: COMMERCIAL

## 2019-01-25 VITALS
SYSTOLIC BLOOD PRESSURE: 150 MMHG | RESPIRATION RATE: 16 BRPM | TEMPERATURE: 99 F | HEART RATE: 72 BPM | DIASTOLIC BLOOD PRESSURE: 82 MMHG | OXYGEN SATURATION: 97 %

## 2019-01-25 DIAGNOSIS — M62.830 PARASPINAL MUSCLE SPASM: Primary | ICD-10-CM

## 2019-01-25 LAB
POCT BILIRUBIN URINE: NEGATIVE
POCT BLOOD URINE: NEGATIVE
POCT GLUCOSE URINE: NEGATIVE MG/DL
POCT KETONE URINE: NEGATIVE MG/DL
POCT LEUKOCYTE ESTERASE URINE: NEGATIVE
POCT NITRITE URINE: NEGATIVE
POCT PH URINE: 5.5 (ref 5–8)
POCT PROTEIN URINE: NEGATIVE MG/DL
POCT SPECIFIC GRAVITY URINE: 1.02
POCT URINE CLARITY: CLEAR
POCT URINE COLOR: YELLOW
POCT UROBILINOGEN URINE: 0.2 MG/DL

## 2019-01-25 PROCEDURE — 99213 OFFICE O/P EST LOW 20 MIN: CPT

## 2019-01-25 PROCEDURE — 81002 URINALYSIS NONAUTO W/O SCOPE: CPT | Performed by: NURSE PRACTITIONER

## 2019-01-25 PROCEDURE — 99214 OFFICE O/P EST MOD 30 MIN: CPT

## 2019-01-25 RX ORDER — ORPHENADRINE CITRATE 100 MG/1
100 TABLET, EXTENDED RELEASE ORAL 2 TIMES DAILY
Qty: 1400 MG | Refills: 0 | Status: SHIPPED | OUTPATIENT
Start: 2019-01-25 | End: 2019-02-01

## 2019-01-25 NOTE — ED INITIAL ASSESSMENT (HPI)
Pt states hx of fall approx 10 days ago. States seen by pmd 1/21 but did not have this backpain. Seen at [facility] 1/23 abd given meds. States pain is getting worse in his right flank area. States hx of kidney stones but feels this pain is different. No nausea.

## 2019-01-26 NOTE — ED PROVIDER NOTES
Patient Seen in: [address] [facility]    History   Patient presents with:  Back Pain (musculoskeletal)    Stated Complaint: BACK PAIN    28-year-old male who presents to the immediate care with complaints of right-sided mid back pain.   Patient shoulder arthroplasty   • APPENDECTOMY      rupture   • CHOLECYSTECTOMY  10/2003   • EXTREMITY LOWER IRRIGATION & DEBRIDEMENT Right 8/5/2015    Performed by Alondra Pereyra MD at [facility] MAIN OR   • FOREARM/WRIST SURGERY UNLISTED Bilateral 1999    carpal tunn abnormality  Neck:  Supple; symmetrical, trachea midline, no adenopathy  Lungs:  Clear to auscultation bilaterally, respirations unlabored   Heart:  Regular rate & rhythm, S1 and S2 normal, no murmurs, rubs, or gallops  Abdomen:  Soft, non-tender, bowel so [address] [facility]  [phone]      As needed, If symptoms worsen        Medications Prescribed:  Discharge Medication List as of 1/25/2019  3:39 PM    START taking these medications    Orphenadrine Citrate  MG Oral Tablet 12 Hr  Take 100 mg

## 2019-01-29 ENCOUNTER — OFFICE VISIT (OUTPATIENT)
Dept: FAMILY MEDICINE CLINIC | Facility: CLINIC | Age: 59
End: 2019-01-29
Payer: COMMERCIAL

## 2019-01-29 VITALS
HEART RATE: 88 BPM | TEMPERATURE: 98 F | SYSTOLIC BLOOD PRESSURE: 124 MMHG | DIASTOLIC BLOOD PRESSURE: 86 MMHG | OXYGEN SATURATION: 97 %

## 2019-01-29 DIAGNOSIS — M62.830 MUSCLE SPASM OF BACK: Primary | ICD-10-CM

## 2019-01-29 PROCEDURE — 99214 OFFICE O/P EST MOD 30 MIN: CPT | Performed by: FAMILY MEDICINE

## 2019-01-29 RX ORDER — CYCLOBENZAPRINE HCL 10 MG
10 TABLET ORAL 3 TIMES DAILY PRN
Qty: 15 TABLET | Refills: 0 | Status: SHIPPED | OUTPATIENT
Start: 2019-01-29 | End: 2019-02-18

## 2019-01-29 RX ORDER — NAPROXEN 500 MG/1
500 TABLET ORAL 2 TIMES DAILY WITH MEALS
Qty: 30 TABLET | Refills: 0 | Status: SHIPPED | OUTPATIENT
Start: 2019-01-29 | End: 2019-02-26 | Stop reason: ALTCHOICE

## 2019-01-29 NOTE — PROGRESS NOTES
Jamal Ward is a 62year old male. Patient presents with:  Back Pain: per pt- fell a couple of weeks ago, pain getting worse since last visit      HPI:   Back pain since fall. Started the day after he saw me last week.  Not getting better, not signific Past Medical History:   Diagnosis Date   • Acute bronchitis    • Anesthesia complication     agitated upon waking with  cervical fusion   • Back problem     cervical surgery   • Bowel obstruction [facility]) March 2015   • Calculus of kidney    • Cellulitis edema  Musc: tenderness in musculature of right lower back, muscle tension. Skin irritation where he had the lidocaine patch. No spinal or bony tenderness.    Psych: normal affect     ASSESSMENT AND PLAN:     Muscle spasm of back  (primary encounter diagnos

## 2019-02-17 ENCOUNTER — APPOINTMENT (OUTPATIENT)
Dept: CT IMAGING | Facility: HOSPITAL | Age: 59
End: 2019-02-17
Attending: EMERGENCY MEDICINE
Payer: COMMERCIAL

## 2019-02-17 ENCOUNTER — APPOINTMENT (OUTPATIENT)
Dept: GENERAL RADIOLOGY | Facility: HOSPITAL | Age: 59
End: 2019-02-17
Attending: EMERGENCY MEDICINE
Payer: COMMERCIAL

## 2019-02-17 ENCOUNTER — HOSPITAL ENCOUNTER (EMERGENCY)
Facility: HOSPITAL | Age: 59
Discharge: HOME | End: 2019-02-17
Attending: EMERGENCY MEDICINE
Payer: COMMERCIAL

## 2019-02-17 VITALS
HEIGHT: 71 IN | TEMPERATURE: 98 F | RESPIRATION RATE: 18 BRPM | WEIGHT: 225 LBS | OXYGEN SATURATION: 98 % | DIASTOLIC BLOOD PRESSURE: 88 MMHG | HEART RATE: 90 BPM | SYSTOLIC BLOOD PRESSURE: 133 MMHG | BODY MASS INDEX: 31.5 KG/M2

## 2019-02-17 DIAGNOSIS — S39.012A BACK STRAIN, INITIAL ENCOUNTER: ICD-10-CM
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
## 2019-02-17 DIAGNOSIS — T07.XXXA MULTIPLE CONTUSIONS: ICD-10-CM

## 2019-02-17 PROCEDURE — 70450 CT HEAD/BRAIN W/O DYE: CPT | Performed by: EMERGENCY MEDICINE

## 2019-02-17 PROCEDURE — 99285 EMERGENCY DEPT VISIT HI MDM: CPT

## 2019-02-17 PROCEDURE — 72072 X-RAY EXAM THORAC SPINE 3VWS: CPT | Performed by: EMERGENCY MEDICINE

## 2019-02-17 PROCEDURE — 73080 X-RAY EXAM OF ELBOW: CPT | Performed by: EMERGENCY MEDICINE

## 2019-02-17 PROCEDURE — 73560 X-RAY EXAM OF KNEE 1 OR 2: CPT | Performed by: EMERGENCY MEDICINE

## 2019-02-17 PROCEDURE — 96361 HYDRATE IV INFUSION ADD-ON: CPT

## 2019-02-17 PROCEDURE — 72110 X-RAY EXAM L-2 SPINE 4/>VWS: CPT | Performed by: EMERGENCY MEDICINE

## 2019-02-17 PROCEDURE — 99284 EMERGENCY DEPT VISIT MOD MDM: CPT

## 2019-02-17 PROCEDURE — 73030 X-RAY EXAM OF SHOULDER: CPT | Performed by: EMERGENCY MEDICINE

## 2019-02-17 PROCEDURE — 96360 HYDRATION IV INFUSION INIT: CPT

## 2019-02-17 PROCEDURE — 72125 CT NECK SPINE W/O DYE: CPT | Performed by: EMERGENCY MEDICINE

## 2019-02-17 RX ORDER — IBUPROFEN 600 MG/1
600 TABLET ORAL ONCE
Status: COMPLETED | OUTPATIENT
Start: 2019-02-17 | End: 2019-02-17

## 2019-02-17 RX ORDER — CYCLOBENZAPRINE HCL 10 MG
10 TABLET ORAL 3 TIMES DAILY PRN
Qty: 15 TABLET | Refills: 0 | Status: SHIPPED | OUTPATIENT
Start: 2019-02-17 | End: 2019-04-23 | Stop reason: CLARIF

## 2019-02-17 NOTE — ED INITIAL ASSESSMENT (HPI)
Pt states slipped on the ice falling backwards onto his back and left shoulder/elbow. C/o pain to same. Pt states had recent surgery on his left shoulder 12/20. Pt still wearing his sling from surgery. Pt denies LOC. Pt also c/o midline neck pain.   P

## 2019-02-18 NOTE — ED PROVIDER NOTES
Patient Seen in: [facility] Emergency Department    History   Patient presents with:  Fall (musculoskeletal, neurologic)    Stated Complaint: Velma Mims on ice at 1015am, pain to R knee, L shoulder, neck and back pain    HPI    19-year-old male presents a at [facility] MAIN OR   • FOREARM/WRIST SURGERY UNLISTED Bilateral 1999    carpal tunnel R wrist, l wrist   • KNEE TOTAL REPLACEMENT Right 9/19/2018    Performed by August Rodriguez MD at [facility] MAIN OR   • LEG/ANKLE SURGERY PROC UNLISTED      fx R leg   • OTHER SURG The right knee has no significant swelling or bruising. He reports pain with range of motion. Normal peripheral pulses   Neuro: Alert oriented and nonfocal   Back: Normal curvature and alignment. No step-offs.   Patient reporting diffuse tenderness along L5. Vertebral body heights are maintained throughout the lumbar spine. Disc spaces are maintained throughout the lumbar spine. Facet hypertrophic changes in the mid to lower lumbar spine.  IMPRESSION: Mild to moderate osteoarthritic changes of the lumbar sp Marginal enthesophytes are noted. IMPRESSION: Mild osteoarthritic changes the left elbow. No fracture or dislocation is identified.   No significant changes since prior exam.   Dictated by: Linda Malin MD on 2/17/2019 at 17:46     Approved by: Cody Son the previous left glenoid rim fracture.   Dictated by: Nelia Ibrahim MD on 1/21/2019 at 12:12     Approved by: Nelia Ibrahim MD            Ct Brain Or Head ([id number])    PROCEDURE:  CT BRAIN OR HEAD ([id number])  COMPARISON:  NEIL MENDOZA/ Aaliyah 29 (As transcribed by Technologist)  Patient states that he fell on ice. No loss of consciousness. In c-collar. Pain to right knee, left shoulder, back and neck. FINDINGS: Unremarkable alignment of the cervical spine.  Anterior fixation plate and screws with Instructed to take NSAIDs and apply ice/heat to the areas of discomfort. To follow-up with his orthopedic surgeon regarding the left shoulder. To return to the emergency room with any concerns. Disposition and Plan     Clinical Impression:   Miquel

## 2019-02-20 ENCOUNTER — TELEPHONE (OUTPATIENT)
Dept: FAMILY MEDICINE CLINIC | Facility: CLINIC | Age: 59
End: 2019-02-20

## 2019-02-20 NOTE — TELEPHONE ENCOUNTER
Noted.  Appt changed to 30 min    Future Appointments   Date Time Provider Alok Darling   2/26/2019 10:45 AM Austin Brunner [facility] EMG Marcieindio Fryvignesh   3/1/2019 10:20 AM Talon Daigle MD [facility] NP ANURADHA Barksdale   9/10/2019 10:00 AM [facility]

## 2019-02-26 ENCOUNTER — TELEPHONE (OUTPATIENT)
Dept: FAMILY MEDICINE CLINIC | Facility: CLINIC | Age: 59
End: 2019-02-26

## 2019-02-26 ENCOUNTER — OFFICE VISIT (OUTPATIENT)
Dept: FAMILY MEDICINE CLINIC | Facility: CLINIC | Age: 59
End: 2019-02-26
Payer: COMMERCIAL

## 2019-02-26 VITALS
TEMPERATURE: 98 F | BODY MASS INDEX: 32.06 KG/M2 | DIASTOLIC BLOOD PRESSURE: 86 MMHG | HEART RATE: 80 BPM | WEIGHT: 229 LBS | SYSTOLIC BLOOD PRESSURE: 124 MMHG | HEIGHT: 71 IN | RESPIRATION RATE: 16 BRPM

## 2019-02-26 DIAGNOSIS — R29.6 FREQUENT FALLS: Primary | ICD-10-CM

## 2019-02-26 DIAGNOSIS — S16.1XXA STRAIN OF NECK MUSCLE, INITIAL ENCOUNTER: ICD-10-CM

## 2019-02-26 DIAGNOSIS — S39.012A STRAIN OF LUMBAR REGION, INITIAL ENCOUNTER: ICD-10-CM

## 2019-02-26 DIAGNOSIS — F32.1 CURRENT MODERATE EPISODE OF MAJOR DEPRESSIVE DISORDER WITHOUT PRIOR EPISODE (HCC): ICD-10-CM

## 2019-02-26 PROCEDURE — 99214 OFFICE O/P EST MOD 30 MIN: CPT | Performed by: FAMILY MEDICINE

## 2019-02-26 RX ORDER — DESVENLAFAXINE 25 MG/1
25 TABLET, EXTENDED RELEASE ORAL DAILY
Qty: 90 TABLET | Refills: 1 | Status: SHIPPED | OUTPATIENT
Start: 2019-02-26 | End: 2019-06-17

## 2019-02-26 NOTE — PROGRESS NOTES
Collin Avina is a 62year old male. Patient presents with:  ER F/U: for fall       HPI:   Depression: worse in the past few weeks. Found out his wife doesn't care about him. They will likely be . Having trouble with their house and loans also. Preoperative examination, unspecified    • Sleep apnea    • Tinea pedis    • Vertigo    • Visual impairment     glassses      Social History:  Social History    Tobacco Use      Smoking status: Never Smoker      Smokeless tobacco: Never Used    Alcohol use disorder without prior episode (hcc)    Diagnoses and all orders for this visit:    Frequent falls  -     PHYSICAL THERAPY EXTERNAL    Strain of neck muscle, initial encounter  -     PHYSICAL THERAPY EXTERNAL    Strain of lumbar region, initial encounter

## 2019-02-26 NOTE — TELEPHONE ENCOUNTER
PT STOPPED IN AND ADV THAT HE HAS A TEMP PARKING PLACARD, IT ENDS THE END OF MARCH. PT WOULD LIKE TO KNOW IF DR WOULD SIGN OFF ON A PERMANENT PLACARD SINCE HES STILL NOT WALKING WELL W/OUT FALLING.     PLEASE CALL AND ADV    ADV MAY NOT GET BACK W/PT UNT

## 2019-03-01 NOTE — TELEPHONE ENCOUNTER
Form completed by Dr Erika Sands and placed in  folder. Patient notified and verbalized understanding.

## 2019-03-04 ENCOUNTER — MED REC SCAN ONLY (OUTPATIENT)
Dept: FAMILY MEDICINE CLINIC | Facility: CLINIC | Age: 59
End: 2019-03-04

## 2019-03-07 ENCOUNTER — HOSPITAL ENCOUNTER (EMERGENCY)
Facility: HOSPITAL | Age: 59
Discharge: STILL ADMITTED OR ADMINISTRATIVE | End: 2019-03-11
Payer: COMMERCIAL

## 2019-04-01 ENCOUNTER — MED REC SCAN ONLY (OUTPATIENT)
Dept: FAMILY MEDICINE CLINIC | Facility: CLINIC | Age: 59
End: 2019-04-01

## 2019-04-11 ENCOUNTER — HOSPITAL ENCOUNTER (OUTPATIENT)
Dept: ULTRASOUND IMAGING | Facility: HOSPITAL | Age: 59
Discharge: HOME | End: 2019-04-11
Attending: PHYSICIAN ASSISTANT
Payer: COMMERCIAL

## 2019-04-11 DIAGNOSIS — M19.012 ARTHRITIS OF LEFT GLENOHUMERAL JOINT: ICD-10-CM

## 2019-04-11 PROCEDURE — 76881 US COMPL JOINT R-T W/IMG: CPT | Performed by: PHYSICIAN ASSISTANT

## 2019-04-23 ENCOUNTER — APPOINTMENT (OUTPATIENT)
Dept: GENERAL RADIOLOGY | Facility: HOSPITAL | Age: 59
End: 2019-04-23
Attending: INTERNAL MEDICINE
Payer: COMMERCIAL

## 2019-04-23 ENCOUNTER — APPOINTMENT (OUTPATIENT)
Dept: GENERAL RADIOLOGY | Facility: HOSPITAL | Age: 59
End: 2019-04-23
Attending: EMERGENCY MEDICINE
Payer: COMMERCIAL

## 2019-04-23 ENCOUNTER — ANESTHESIA EVENT (OUTPATIENT)
Dept: ENDOSCOPY | Facility: HOSPITAL | Age: 59
End: 2019-04-23

## 2019-04-23 ENCOUNTER — HOSPITAL ENCOUNTER (EMERGENCY)
Facility: HOSPITAL | Age: 59
Discharge: HOME | End: 2019-04-23
Attending: EMERGENCY MEDICINE
Payer: COMMERCIAL

## 2019-04-23 ENCOUNTER — ANESTHESIA (OUTPATIENT)
Dept: ENDOSCOPY | Facility: HOSPITAL | Age: 59
End: 2019-04-23

## 2019-04-23 VITALS
HEIGHT: 71 IN | DIASTOLIC BLOOD PRESSURE: 80 MMHG | BODY MASS INDEX: 30.1 KG/M2 | WEIGHT: 215 LBS | TEMPERATURE: 98 F | SYSTOLIC BLOOD PRESSURE: 114 MMHG | HEART RATE: 81 BPM | RESPIRATION RATE: 21 BRPM | OXYGEN SATURATION: 96 %

## 2019-04-23 DIAGNOSIS — T18.2XXA FOREIGN BODY IN STOMACH, INITIAL ENCOUNTER: Primary | ICD-10-CM

## 2019-04-23 PROCEDURE — 0DC68ZZ EXTIRPATION OF MATTER FROM STOMACH, VIA NATURAL OR ARTIFICIAL OPENING ENDOSCOPIC: ICD-10-PCS | Performed by: INTERNAL MEDICINE

## 2019-04-23 PROCEDURE — 99285 EMERGENCY DEPT VISIT HI MDM: CPT

## 2019-04-23 PROCEDURE — 76000 FLUOROSCOPY <1 HR PHYS/QHP: CPT | Performed by: INTERNAL MEDICINE

## 2019-04-23 PROCEDURE — BD12ZZZ FLUOROSCOPY OF STOMACH: ICD-10-PCS | Performed by: INTERNAL MEDICINE

## 2019-04-23 PROCEDURE — 74018 RADEX ABDOMEN 1 VIEW: CPT | Performed by: EMERGENCY MEDICINE

## 2019-04-23 RX ORDER — METOCLOPRAMIDE HYDROCHLORIDE 5 MG/ML
10 INJECTION INTRAMUSCULAR; INTRAVENOUS AS NEEDED
Status: DISCONTINUED | OUTPATIENT
Start: 2019-04-23 | End: 2019-04-23

## 2019-04-23 RX ORDER — ONDANSETRON 2 MG/ML
4 INJECTION INTRAMUSCULAR; INTRAVENOUS AS NEEDED
Status: DISCONTINUED | OUTPATIENT
Start: 2019-04-23 | End: 2019-04-23

## 2019-04-23 RX ORDER — NALOXONE HYDROCHLORIDE 0.4 MG/ML
80 INJECTION, SOLUTION INTRAMUSCULAR; INTRAVENOUS; SUBCUTANEOUS AS NEEDED
Status: DISCONTINUED | OUTPATIENT
Start: 2019-04-23 | End: 2019-04-23

## 2019-04-23 RX ORDER — LABETALOL HYDROCHLORIDE 5 MG/ML
5 INJECTION, SOLUTION INTRAVENOUS EVERY 5 MIN PRN
Status: DISCONTINUED | OUTPATIENT
Start: 2019-04-23 | End: 2019-04-23

## 2019-04-23 RX ORDER — SODIUM CHLORIDE, SODIUM LACTATE, POTASSIUM CHLORIDE, CALCIUM CHLORIDE 600; 310; 30; 20 MG/100ML; MG/100ML; MG/100ML; MG/100ML
INJECTION, SOLUTION INTRAVENOUS CONTINUOUS
Status: DISCONTINUED | OUTPATIENT
Start: 2019-04-23 | End: 2019-04-23

## 2019-04-23 RX ORDER — AMOXICILLIN 500 MG/1
2000 CAPSULE ORAL ONCE
COMMUNITY
End: 2019-04-24 | Stop reason: CLARIF

## 2019-04-23 RX ORDER — SODIUM CHLORIDE 9 MG/ML
1000 INJECTION, SOLUTION INTRAVENOUS ONCE
Status: COMPLETED | OUTPATIENT
Start: 2019-04-23 | End: 2019-04-23

## 2019-04-23 RX ORDER — MEPERIDINE HYDROCHLORIDE 25 MG/ML
12.5 INJECTION INTRAMUSCULAR; INTRAVENOUS; SUBCUTANEOUS AS NEEDED
Status: DISCONTINUED | OUTPATIENT
Start: 2019-04-23 | End: 2019-04-23

## 2019-04-23 RX ORDER — MIDAZOLAM HYDROCHLORIDE 1 MG/ML
1 INJECTION INTRAMUSCULAR; INTRAVENOUS EVERY 5 MIN PRN
Status: DISCONTINUED | OUTPATIENT
Start: 2019-04-23 | End: 2019-04-23

## 2019-04-23 NOTE — OPERATIVE REPORT
EGD operative report  Patient Name: Guerda Vinson  Procedure: Esophagogastroduodenoscopy with small bowel endoscopy with fluoroscopy  Date of procedure: 4/23/2019    Indication: Foreign body stomach  Attending: Angela Rivera M.D.   Consent:  The risks gastric body, antrum,  cardia, and angularis were normal, without masses, polyps, ulcers, erosions, diverticula, or varices. The fundus was largely obscured by a large amount of food.   Aggressive effort was made to suction all fluid component, followed by

## 2019-04-23 NOTE — ED PROVIDER NOTES
Patient Seen in: [facility] Emergency Department    History   Patient presents with:  FB in Throat (GI, respiratory)    Stated Complaint: swallowed dental file    HPI    This is a 43-year-old male who arrives here with he states that he swallowed a FOREARM/WRIST SURGERY UNLISTED Bilateral 1999    carpal tunnel R wrist, l wrist   • KNEE TOTAL REPLACEMENT Right 9/19/2018    Performed by Sabrina De Souza MD at [facility] MAIN OR   • LEG/ANKLE SURGERY PROC UNLISTED      fx R leg   • OTHER SURGICAL HISTORY Labs Reviewed - No data to display                        MDM   Xr Abdomen (1 View) (cpt=74018)    Result Date: 4/23/2019  PROCEDURE:  XR ABDOMEN (1 VIEW) (CPT=74018)  INDICATIONS:  swallowed dental file  COMPARISON:  None.   TECHNIQUE:  Supine AP view wa pm    Follow-up:  No follow-up provider specified.       Medications Prescribed:  Current Discharge Medication List

## 2019-04-23 NOTE — H&P
[facility] Patient Status:  Emergency    12/15/1960 MRN [id number]   Location [address] [facility] Attending Birdie Rosa MD   Hosp Day # 0 PCP Giulia Billingsley DO     History of Presen HISTORY      partial colectomy   • SHOULDER TOTAL Left 12/20/2018    Performed by Cary Rock MD at [address] [facility]  3/2011    cervical vertebral fusion   • TOTAL KNEE REPLACEMENT Right    • [address] Denies fatigue, chills/fever, night sweats, weight loss, loss of appetite, weight gain, sleep disturbance. Neurological: Denies frequent headaches, history of stroke, recent passing out, recent dizziness, convulsions/seizures, dementia.   Cardiovascular: D abnormality, atraumatic. Eyes: Conjunctivae/corneas clear. No scleral icterus. No conjunctival     hemorrhage. Nose: Nares normal.   Throat: Lips, mucosa, and tongue normal.  No thrush noted.    Neck: Soft, supple neck; trachea midline, no adenopathy,

## 2019-04-23 NOTE — ANESTHESIA POSTPROCEDURE EVALUATION
[address] Patient Status:  Emergency   Age/Gender 62year old male MRN [id number]   Location [address] [facility]. Attending No att. providers found   [facility] Day # 0 PCP Bee Pendleton DO       Anesthesia Post-op Note    Procedure(

## 2019-04-23 NOTE — ED NOTES
Spoke with Peg Lindsay from endoscopy. Pt last oral intake reported at 1230pm.   Wife Art Pillai at bedside at this time. Awaiting transport for pt to go to endoscopy or OR for procedure per Peg Lindsay. Communicated plan of care to pt and family.

## 2019-04-23 NOTE — ANESTHESIA PREPROCEDURE EVALUATION
PRE-OP EVALUATION    Patient Name: Carol Morocho    Pre-op Diagnosis: removal of foreign body    Procedure(s):  Esophagogastroduodenoscopy with foreign body removal    Surgeon(s) and Role:     * Thong Griggs MD - Primary    Pre-op vitals reviewed. Performed by Maria Isabel Rivas MD at [facility] MAIN OR   • FOREARM/WRIST SURGERY UNLISTED Bilateral 1999    carpal tunnel R wrist, l wrist   • KNEE TOTAL REPLACEMENT Right 9/19/2018    Performed by Maria Isabel Rivas MD at [facility] MAIN OR   • LEG/ANKLE SURGERY PROC UNL

## 2019-04-24 ENCOUNTER — HOSPITAL ENCOUNTER (EMERGENCY)
Facility: HOSPITAL | Age: 59
Discharge: HOME | End: 2019-04-24
Attending: EMERGENCY MEDICINE
Payer: COMMERCIAL

## 2019-04-24 ENCOUNTER — HOSPITAL ENCOUNTER (OUTPATIENT)
Dept: GENERAL RADIOLOGY | Age: 59
Discharge: HOME | End: 2019-04-24
Attending: INTERNAL MEDICINE
Payer: COMMERCIAL

## 2019-04-24 ENCOUNTER — ANESTHESIA EVENT (OUTPATIENT)
Dept: ENDOSCOPY | Facility: HOSPITAL | Age: 59
End: 2019-04-24

## 2019-04-24 ENCOUNTER — HOSPITAL ENCOUNTER (OUTPATIENT)
Dept: CT IMAGING | Age: 59
Discharge: HOME | End: 2019-04-24
Attending: INTERNAL MEDICINE
Payer: COMMERCIAL

## 2019-04-24 ENCOUNTER — ANESTHESIA (OUTPATIENT)
Dept: ENDOSCOPY | Facility: HOSPITAL | Age: 59
End: 2019-04-24

## 2019-04-24 VITALS
TEMPERATURE: 99 F | HEART RATE: 85 BPM | HEIGHT: 71 IN | OXYGEN SATURATION: 91 % | WEIGHT: 215 LBS | RESPIRATION RATE: 18 BRPM | BODY MASS INDEX: 30.1 KG/M2 | SYSTOLIC BLOOD PRESSURE: 133 MMHG | DIASTOLIC BLOOD PRESSURE: 91 MMHG

## 2019-04-24 DIAGNOSIS — T18.2XXA FOREIGN BODY IN STOMACH, INITIAL ENCOUNTER: ICD-10-CM

## 2019-04-24 DIAGNOSIS — T18.3XXD FOREIGN BODY IN SMALL INTESTINE, SUBSEQUENT ENCOUNTER: ICD-10-CM

## 2019-04-24 DIAGNOSIS — M79.5 FOREIGN BODY (FB) IN SOFT TISSUE: Primary | ICD-10-CM

## 2019-04-24 DIAGNOSIS — T18.9XXA SWALLOWED FOREIGN BODY, INITIAL ENCOUNTER: Primary | ICD-10-CM

## 2019-04-24 PROCEDURE — 99285 EMERGENCY DEPT VISIT HI MDM: CPT

## 2019-04-24 PROCEDURE — 74019 RADEX ABDOMEN 2 VIEWS: CPT | Performed by: INTERNAL MEDICINE

## 2019-04-24 PROCEDURE — 96374 THER/PROPH/DIAG INJ IV PUSH: CPT

## 2019-04-24 PROCEDURE — 74176 CT ABD & PELVIS W/O CONTRAST: CPT | Performed by: INTERNAL MEDICINE

## 2019-04-24 PROCEDURE — 96361 HYDRATE IV INFUSION ADD-ON: CPT

## 2019-04-24 PROCEDURE — 0DC68ZZ EXTIRPATION OF MATTER FROM STOMACH, VIA NATURAL OR ARTIFICIAL OPENING ENDOSCOPIC: ICD-10-PCS | Performed by: STUDENT IN AN ORGANIZED HEALTH CARE EDUCATION/TRAINING PROGRAM

## 2019-04-24 PROCEDURE — 74021 RADEX ABDOMEN 3+ VIEWS: CPT | Performed by: INTERNAL MEDICINE

## 2019-04-24 RX ORDER — SODIUM CHLORIDE, SODIUM LACTATE, POTASSIUM CHLORIDE, CALCIUM CHLORIDE 600; 310; 30; 20 MG/100ML; MG/100ML; MG/100ML; MG/100ML
INJECTION, SOLUTION INTRAVENOUS CONTINUOUS
Status: DISCONTINUED | OUTPATIENT
Start: 2019-04-24 | End: 2019-04-25

## 2019-04-24 RX ORDER — NALOXONE HYDROCHLORIDE 0.4 MG/ML
80 INJECTION, SOLUTION INTRAMUSCULAR; INTRAVENOUS; SUBCUTANEOUS AS NEEDED
Status: DISCONTINUED | OUTPATIENT
Start: 2019-04-24 | End: 2019-04-25

## 2019-04-24 RX ORDER — METOCLOPRAMIDE HYDROCHLORIDE 5 MG/ML
10 INJECTION INTRAMUSCULAR; INTRAVENOUS ONCE
Status: COMPLETED | OUTPATIENT
Start: 2019-04-24 | End: 2019-04-24

## 2019-04-24 RX ORDER — HYDROMORPHONE HYDROCHLORIDE 1 MG/ML
0.4 INJECTION, SOLUTION INTRAMUSCULAR; INTRAVENOUS; SUBCUTANEOUS EVERY 5 MIN PRN
Status: DISCONTINUED | OUTPATIENT
Start: 2019-04-24 | End: 2019-04-25

## 2019-04-24 RX ORDER — SODIUM CHLORIDE 9 MG/ML
125 INJECTION, SOLUTION INTRAVENOUS CONTINUOUS
Status: DISCONTINUED | OUTPATIENT
Start: 2019-04-24 | End: 2019-04-25

## 2019-04-24 RX ORDER — CEFAZOLIN SODIUM/WATER 2 G/20 ML
SYRINGE (ML) INTRAVENOUS
Status: DISCONTINUED
Start: 2019-04-24 | End: 2019-04-25

## 2019-04-24 RX ORDER — LABETALOL HYDROCHLORIDE 5 MG/ML
5 INJECTION, SOLUTION INTRAVENOUS EVERY 5 MIN PRN
Status: DISCONTINUED | OUTPATIENT
Start: 2019-04-24 | End: 2019-04-25

## 2019-04-24 RX ORDER — MIDAZOLAM HYDROCHLORIDE 1 MG/ML
1 INJECTION INTRAMUSCULAR; INTRAVENOUS EVERY 5 MIN PRN
Status: DISCONTINUED | OUTPATIENT
Start: 2019-04-24 | End: 2019-04-25

## 2019-04-24 RX ORDER — MEPERIDINE HYDROCHLORIDE 25 MG/ML
12.5 INJECTION INTRAMUSCULAR; INTRAVENOUS; SUBCUTANEOUS AS NEEDED
Status: DISCONTINUED | OUTPATIENT
Start: 2019-04-24 | End: 2019-04-25

## 2019-04-24 RX ORDER — LABETALOL HYDROCHLORIDE 5 MG/ML
INJECTION, SOLUTION INTRAVENOUS
Status: COMPLETED
Start: 2019-04-24 | End: 2019-04-24

## 2019-04-25 NOTE — ED INITIAL ASSESSMENT (HPI)
Pt states he swallowed a dental file at 2pm yesterday, pt seen here in ER yesterday, sent to ENDO, pt states they couldn't find it and had an xray and CT scan today and told to come to ER today

## 2019-04-25 NOTE — ED PROVIDER NOTES
Patient Seen in: [facility] Emergency Department    History   Patient presents with:  Abdomen/Flank Pain (GI/)    Stated Complaint: FB in stomach     HPI    Danny Sandhoff is a 55-year-old male who presents today for foreign body in the stomach.   His past m wrist   • KNEE TOTAL REPLACEMENT Right 9/19/2018    Performed by Lora Luo MD at [facility] MAIN OR   • LEG/ANKLE SURGERY PROC UNLISTED      fx R leg   • OTHER SURGICAL HISTORY      exploratory laparotomy   • OTHER SURGICAL HISTORY      partial colectomy III - XII grossly intact, normal strength and sensation. Skin: Skin is warm and dry. No rash noted. No erythema.        ED Course     Labs Reviewed   RAINBOW DRAW BLUE   RAINBOW DRAW LAVENDER   RAINBOW DRAW LIGHT GREEN   RAINBOW DRAW GOLD          Xr Dorean Race C-ARM TIME <1 HOUR  (CPT=76000), 4/23/2019, 17:59. EDWARD , XR ABDOMEN (1 VIEW) (CPT=74018), 4/23/2019, 15:48. INDICATIONS:  T18. 2XXA Foreign body in stomach, initial encounter  PATIENT STATED HISTORY: (As transcribed by Technologist)  Patient states he ORAL)(CPT=74176), 8/08/2018, 15:42. INDICATIONS:  T18. 3XXD Foreign body in small intestine, subsequent encounter  TECHNIQUE:  Unenhanced multislice CT scanning from above the kidneys to below the urinary bladder.   2D rendering are generated on the CT scan Time <1 Hour  (cpt=76000)    Result Date: 4/23/2019  PROCEDURE:  XR FLUOROSCOPY C-ARM TIME <1 HOUR  (CPT=76000)  INDICATIONS:  EGD  COMPARISON:  None.    TECHNIQUE:  FLUOROSCOPY IMAGES OBTAINED:  5 FLUOROSCOPY TIME:  9.6sec TECHNOLOGIST TIME:  15min BESSIEATI

## 2019-04-25 NOTE — ANESTHESIA POSTPROCEDURE EVALUATION
[address] Patient Status:  Emergency   Age/Gender 62year old male MRN [id number]   Location [address] [place] [address] Attending tuHailee MD   [address] Day # 0 PCP Rica Barreto DO       Anesthesia Post-op Note

## 2019-04-25 NOTE — OPERATIVE REPORT
EGD operative report  Patient Name: Chantel Phillip  Procedure: Esophagogastroduodenoscopy with foreign body removal  Indication: foreign body ingestion  DOS: 4/24/19  Attending: Mindi Sim M.D.   Consent:  The risks, benefits, and alternatives were dis appeared normal.  Impression: Findings as above  Recommendations:   1. OK for discharge  2. No follow-up warranted  3.  Call GI clinic with any issues post-procedurally      Vel Benitez MD

## 2019-04-25 NOTE — ANESTHESIA PREPROCEDURE EVALUATION
PRE-OP EVALUATION    Patient Name: Tomasa Nipple    Pre-op Diagnosis: Foreign body (FB) in soft tissue [M79.5]    Procedure(s):  ESOPHAGOGASTRODUODENOSCOPY (EGD)    Surgeon(s) and Role:     * Didier Torres MD - Primary    Pre-op vitals reviewed.   Samedelia Marrow • OTHER SURGICAL HISTORY      exploratory laparotomy   • OTHER SURGICAL HISTORY      partial colectomy   • SHOULDER TOTAL Left 12/20/2018    Performed by Abbey Newell MD at [facility] MAIN OR   • [address]  3/2011    cervical vertebral f

## 2019-04-25 NOTE — ED NOTES
Spoke with Shane Ran from endoscopy. Shane Ran sending for transport now for pt to go to endo for procedure. Pt and wife at bedside made aware.

## 2019-04-25 NOTE — CONSULTS
[facility]  Report of GI H and [address] Patient Status:  Emergency    12/15/1960 MRN [id number]   Location [address] Attending Emil Rossi MD   [facility] Day # 0 PCP Sadaf Connor DO     Date of Admission:   1999    carpal tunnel R wrist, l wrist   • KNEE TOTAL REPLACEMENT Right 9/19/2018    Performed by Alo Diaz MD at [address] [facility] OR   • LEG/ANKLE SURGERY PROC UNLISTED      fx R leg   • OTHER SURGICAL HISTORY      exploratory laparotomy   • OTHER SURGICAL wasting. Normal oral mucosa with good dentition, no ulcers. EYES: No scleral icterus, no conjunctival pallor.   NECK: No thyromegaly or cervical lymphadenopathy  PULM: Lungs clear to auscultation anteriorly  CV: Regular, no murmurs, 2+ radial pulses  ABD: could represent an ingested foreign body in the stomach.   The 3 additional 1-2 mm opacities projecting in the left upper quadrant are again noted which may reside in the region of the stomach or transverse colon and again could represent ingested foreign b

## 2019-04-25 NOTE — ED NOTES
Spoke with Gene Rubio from ENDO, pt to go to OR instead of ENDO. Transporter in ED made aware of destination change.

## 2019-05-30 ENCOUNTER — OFFICE VISIT (OUTPATIENT)
Dept: FAMILY MEDICINE CLINIC | Facility: CLINIC | Age: 59
End: 2019-05-30
Payer: COMMERCIAL

## 2019-05-30 VITALS
TEMPERATURE: 99 F | BODY MASS INDEX: 33.6 KG/M2 | WEIGHT: 240 LBS | SYSTOLIC BLOOD PRESSURE: 158 MMHG | RESPIRATION RATE: 16 BRPM | DIASTOLIC BLOOD PRESSURE: 90 MMHG | HEART RATE: 72 BPM | HEIGHT: 71 IN

## 2019-05-30 DIAGNOSIS — G57.11 MERALGIA PARESTHETICA OF RIGHT SIDE: Primary | ICD-10-CM

## 2019-05-30 DIAGNOSIS — T18.9XXA SWALLOWED FOREIGN BODY, INITIAL ENCOUNTER: ICD-10-CM

## 2019-05-30 PROCEDURE — 99214 OFFICE O/P EST MOD 30 MIN: CPT | Performed by: FAMILY MEDICINE

## 2019-05-30 RX ORDER — GABAPENTIN 100 MG/1
100 CAPSULE ORAL 3 TIMES DAILY
Qty: 60 CAPSULE | Refills: 0 | Status: SHIPPED | OUTPATIENT
Start: 2019-05-30 | End: 2019-07-23 | Stop reason: ALTCHOICE

## 2019-05-30 NOTE — PROGRESS NOTES
Karla Arroyo is a 62year old male. Patient presents with:  Leg Pain: burning right leg, started 3 weeks ago      HPI:   In April he was having a dental procedure and the dentist dropped her file and he swallowed it. It was removed by GI in the ER.    H use: Yes      Alcohol/week: 0.0 oz      Comment: 10 drinks per year    Drug use: No       BP Readings from Last 6 Encounters:  05/30/19 : 158/90  04/24/19 : (!) 133/91  04/23/19 : 114/80  02/26/19 : 124/86  02/17/19 : 133/88  01/29/19 : 124/86      Wt Read capsule (100 mg total) by mouth 3 (three) times daily. The patient indicates understanding of these issues and agrees to the plan.

## 2019-06-17 ENCOUNTER — OFFICE VISIT (OUTPATIENT)
Dept: FAMILY MEDICINE CLINIC | Facility: CLINIC | Age: 59
End: 2019-06-17
Payer: COMMERCIAL

## 2019-06-17 VITALS
BODY MASS INDEX: 32.76 KG/M2 | HEIGHT: 71 IN | RESPIRATION RATE: 14 BRPM | DIASTOLIC BLOOD PRESSURE: 70 MMHG | WEIGHT: 234 LBS | SYSTOLIC BLOOD PRESSURE: 120 MMHG | HEART RATE: 78 BPM | TEMPERATURE: 97 F

## 2019-06-17 DIAGNOSIS — F32.1 CURRENT MODERATE EPISODE OF MAJOR DEPRESSIVE DISORDER WITHOUT PRIOR EPISODE (HCC): ICD-10-CM

## 2019-06-17 DIAGNOSIS — G57.11 MERALGIA PARESTHETICA OF RIGHT SIDE: Primary | ICD-10-CM

## 2019-06-17 PROCEDURE — 99214 OFFICE O/P EST MOD 30 MIN: CPT | Performed by: FAMILY MEDICINE

## 2019-06-17 RX ORDER — DESVENLAFAXINE 25 MG/1
25 TABLET, EXTENDED RELEASE ORAL DAILY
Qty: 90 TABLET | Refills: 1 | Status: SHIPPED | OUTPATIENT
Start: 2019-06-17 | End: 2019-12-31

## 2019-06-17 NOTE — PROGRESS NOTES
Catalina Salgado is a 62year old male. Patient presents with:  Sciatica  Medication Request: depression medication       HPI:   Tingling in leg. Gabapentin is not helping much. He is sleeping some at night, then it helps some.      Considering surgery beatriz 158/90  04/24/19 : (!) 133/91  04/23/19 : 114/80  02/26/19 : 124/86  02/17/19 : 133/88      Wt Readings from Last 6 Encounters:  06/17/19 : 234 lb  05/30/19 : 240 lb  04/24/19 : 215 lb  04/23/19 : 215 lb  02/26/19 : 229 lb  02/17/19 : 225 lb      REVIEW OF Diclofenac Sodium 50 MG Oral Tab EC 28 tablet 0     Sig: Take 1 tablet (50 mg total) by mouth 2 (two) times daily for 14 days. • desvenlafaxine succinate ER 25 MG Oral Tablet 24 Hr 90 tablet 1     Sig: Take 1 tablet (25 mg total) by mouth daily.

## 2019-07-23 ENCOUNTER — OFFICE VISIT (OUTPATIENT)
Dept: FAMILY MEDICINE CLINIC | Facility: CLINIC | Age: 59
End: 2019-07-23
Payer: COMMERCIAL

## 2019-07-23 ENCOUNTER — HOSPITAL ENCOUNTER (OUTPATIENT)
Dept: GENERAL RADIOLOGY | Age: 59
Discharge: HOME | End: 2019-07-23
Attending: FAMILY MEDICINE
Payer: COMMERCIAL

## 2019-07-23 VITALS
OXYGEN SATURATION: 96 % | WEIGHT: 232 LBS | BODY MASS INDEX: 32.48 KG/M2 | DIASTOLIC BLOOD PRESSURE: 86 MMHG | TEMPERATURE: 98 F | HEIGHT: 71 IN | HEART RATE: 67 BPM | SYSTOLIC BLOOD PRESSURE: 120 MMHG | RESPIRATION RATE: 16 BRPM

## 2019-07-23 DIAGNOSIS — R07.89 FEELING OF CHEST TIGHTNESS: ICD-10-CM

## 2019-07-23 DIAGNOSIS — R06.00 DOE (DYSPNEA ON EXERTION): Primary | ICD-10-CM

## 2019-07-23 DIAGNOSIS — R06.00 DOE (DYSPNEA ON EXERTION): ICD-10-CM

## 2019-07-23 PROCEDURE — 99214 OFFICE O/P EST MOD 30 MIN: CPT | Performed by: FAMILY MEDICINE

## 2019-07-23 PROCEDURE — 71046 X-RAY EXAM CHEST 2 VIEWS: CPT | Performed by: FAMILY MEDICINE

## 2019-07-23 RX ORDER — ALBUTEROL SULFATE 90 UG/1
2 AEROSOL, METERED RESPIRATORY (INHALATION) EVERY 6 HOURS PRN
Qty: 1 INHALER | Refills: 0 | Status: SHIPPED | OUTPATIENT
Start: 2019-07-23 | End: 2020-04-29

## 2019-07-23 NOTE — PROGRESS NOTES
Dav Wilson is a 62year old male. Patient presents with:  URI: started about last wed, chest tightness at night    HPI:   Lianne Jackson presents to the office with complaints of upper respiratory tract infection. Had a stomach flu last week.  Started Wed • Bowel obstruction [facility]) March 2015   • Calculus of kidney    • Cellulitis    • Conjunctivitis    • Deep vein thrombosis ([facility] [address] 2011    left arm, after bowel obstruction, medicine related per patient   • Depression    • Neuropathy     \"whole body\" per Diabetes Brother    • Other (rheumatoid arthritis) Maternal Grandmother       Social History    Tobacco Use      Smoking status: Never Smoker      Smokeless tobacco: Never Used    Alcohol use:  Yes      Alcohol/week: 0.0 standard drinks      Comment: 10 dri

## 2019-07-29 ENCOUNTER — ORDER TRANSCRIPTION (OUTPATIENT)
Dept: ADMINISTRATIVE | Facility: HOSPITAL | Age: 59
End: 2019-07-29

## 2019-07-29 DIAGNOSIS — M75.100 ROTATOR CUFF SYNDROME: Primary | ICD-10-CM

## 2019-08-05 ENCOUNTER — HOSPITAL ENCOUNTER (OUTPATIENT)
Dept: ULTRASOUND IMAGING | Facility: HOSPITAL | Age: 59
Discharge: HOME | End: 2019-08-05
Attending: ORTHOPAEDIC SURGERY
Payer: COMMERCIAL

## 2019-08-05 DIAGNOSIS — M75.100 ROTATOR CUFF SYNDROME: ICD-10-CM

## 2019-08-05 PROCEDURE — 76881 US COMPL JOINT R-T W/IMG: CPT | Performed by: ORTHOPAEDIC SURGERY

## 2019-08-12 DIAGNOSIS — G57.11 MERALGIA PARESTHETICA OF RIGHT SIDE: ICD-10-CM

## 2019-08-12 NOTE — TELEPHONE ENCOUNTER
Last refilled on 6/17/19 for # 28 with 0 refills  Last OV 7/23/19  Future Appointments   Date Time Provider Alok Lisset   8/23/2019 10:20 AM Christina Alarcon MD MMO NP ORTHO Renata Rk   9/10/2019 10:00 AM Krystina Lehman MD [facility]

## 2019-08-20 ENCOUNTER — TELEPHONE (OUTPATIENT)
Dept: FAMILY MEDICINE CLINIC | Facility: CLINIC | Age: 59
End: 2019-08-20

## 2019-08-20 NOTE — TELEPHONE ENCOUNTER
PT CALLED AND ADV THAT HE JUST HAD ANOTHER US DONE ON L SHOULDER. [facility] HE IS WONDERING IF HE SHOULD GET SECOND OPINION WITH ANOTHER ORTHOPEDIC.       PLEASE CALL AND ADV

## 2019-08-21 NOTE — TELEPHONE ENCOUNTER
Patient advised. Verbalized understanding.        Alexia Orta [facility] phone #: [phone]

## 2019-09-07 DIAGNOSIS — F32.1 CURRENT MODERATE EPISODE OF MAJOR DEPRESSIVE DISORDER WITHOUT PRIOR EPISODE (HCC): ICD-10-CM

## 2019-09-09 RX ORDER — DESVENLAFAXINE 25 MG/1
TABLET, EXTENDED RELEASE ORAL
Qty: 90 TABLET | Refills: 0 | OUTPATIENT
Start: 2019-09-09

## 2019-09-09 NOTE — TELEPHONE ENCOUNTER
Last refill: 6/17/19 #90 w/ 1 refill  Last OV:7/23/19    Future Appointments   Date Time Provider Alok Darling   9/10/2019 10:00 AM Javon Allen MD [facility]   9/24/2019 10:00 AM Basilio Solomon MD MMO NP ORTHO Renata Robertson

## 2019-09-10 ENCOUNTER — TELEPHONE (OUTPATIENT)
Dept: FAMILY MEDICINE CLINIC | Facility: CLINIC | Age: 59
End: 2019-09-10

## 2019-09-10 ENCOUNTER — HOSPITAL ENCOUNTER (OUTPATIENT)
Dept: GENERAL RADIOLOGY | Age: 59
Discharge: HOME | End: 2019-09-10
Attending: ORTHOPAEDIC SURGERY
Payer: COMMERCIAL

## 2019-09-10 DIAGNOSIS — Z96.651 STATUS POST TOTAL RIGHT KNEE REPLACEMENT: ICD-10-CM

## 2019-09-10 PROCEDURE — 73560 X-RAY EXAM OF KNEE 1 OR 2: CPT | Performed by: ORTHOPAEDIC SURGERY

## 2019-09-10 NOTE — TELEPHONE ENCOUNTER
PT STOPPED IN AND ADV THAT HE IS HAVING A VERY DIFFICULT TIME ORDERING SUPPLIES THROUGH [employer]. PT HAS BEEN OUT OF FILTERS FOR CPAP MACHINE FOR THE LAST 3 MONTHS     NO RESPONSE FROM [employer].     LOOKING FOR OTHER RECOMMENDATIONS TO GET SLEEP SUPPLIES [address]

## 2019-09-11 DIAGNOSIS — G57.11 MERALGIA PARESTHETICA OF RIGHT SIDE: ICD-10-CM

## 2019-09-11 NOTE — TELEPHONE ENCOUNTER
LOV: 7/23/19   Last Refill: 8/12/19 #28 0 RF    Future Appointments   Date Time Provider Alok Darling   9/24/2019 10:00 AM Iban Vides MD [facility]MO NP ORTHO Renata [address]   10/10/2019  2:20 PM Elena Cho MD [facility] Maryana Judah

## 2019-09-11 NOTE — TELEPHONE ENCOUNTER
Spoke with patient and he was advised that he was due for f/u since last visit 12/13/19  Pt scheduled visit and will discuss concerns then:  Future Appointments   Date Time Provider Alok Darling   9/24/2019 10:00 AM Paresh Gregory MD MMO NP ORTHO Na

## 2019-09-11 NOTE — TELEPHONE ENCOUNTER
This was not meant to be a long term med? Is he still having the pain/numbness? How is he taking it?

## 2019-09-16 NOTE — TELEPHONE ENCOUNTER
Patient states he is taking for arthritis pain. States he has been taking daily but has been out. States he is more stiff and sore without but is ok not taking.      Advised patient to let office know if symptoms increase to a level that is not tolerable

## 2019-09-26 ENCOUNTER — HOSPITAL ENCOUNTER (OUTPATIENT)
Age: 59
Discharge: ANOTHER HOSPITAL | End: 2019-09-26
Attending: FAMILY MEDICINE
Payer: COMMERCIAL

## 2019-09-26 ENCOUNTER — APPOINTMENT (OUTPATIENT)
Dept: CT IMAGING | Age: 59
End: 2019-09-26
Attending: FAMILY MEDICINE
Payer: COMMERCIAL

## 2019-09-26 ENCOUNTER — HOSPITAL ENCOUNTER (EMERGENCY)
Facility: HOSPITAL | Age: 59
Discharge: HOME | End: 2019-09-26
Attending: EMERGENCY MEDICINE
Payer: COMMERCIAL

## 2019-09-26 VITALS
HEIGHT: 71 IN | WEIGHT: 235 LBS | OXYGEN SATURATION: 97 % | RESPIRATION RATE: 16 BRPM | DIASTOLIC BLOOD PRESSURE: 89 MMHG | TEMPERATURE: 98 F | HEART RATE: 90 BPM | SYSTOLIC BLOOD PRESSURE: 160 MMHG | BODY MASS INDEX: 32.9 KG/M2

## 2019-09-26 VITALS
HEIGHT: 71 IN | DIASTOLIC BLOOD PRESSURE: 99 MMHG | TEMPERATURE: 99 F | WEIGHT: 235 LBS | SYSTOLIC BLOOD PRESSURE: 159 MMHG | RESPIRATION RATE: 18 BRPM | OXYGEN SATURATION: 90 % | BODY MASS INDEX: 32.9 KG/M2 | HEART RATE: 75 BPM

## 2019-09-26 DIAGNOSIS — R10.9 ABDOMINAL PAIN, LEFT LATERAL: Primary | ICD-10-CM

## 2019-09-26 DIAGNOSIS — R10.9 LEFT SIDED ABDOMINAL PAIN: Primary | ICD-10-CM

## 2019-09-26 LAB
#MXD IC: 0.6 X10ˆ3/UL (ref 0.1–1)
CREAT BLD-MCNC: 0.6 MG/DL (ref 0.7–1.3)
GLUCOSE BLD-MCNC: 201 MG/DL (ref 70–99)
HCT VFR BLD AUTO: 39.4 % (ref 39–53)
HGB BLD-MCNC: 14.3 G/DL (ref 13–17.5)
ISTAT BUN: 16 MG/DL (ref 8–20)
ISTAT CHLORIDE: 101 MMOL/L (ref 101–111)
ISTAT HEMATOCRIT: 41 % (ref 37–53)
ISTAT IONIZED CALCIUM FOR CHEM 8: 1.14 MMOL/L (ref 1.12–1.32)
ISTAT POTASSIUM: 4.3 MMOL/L (ref 3.6–5.1)
ISTAT SODIUM: 139 MMOL/L (ref 136–145)
LYMPHOCYTES # BLD AUTO: 1.8 X10ˆ3/UL (ref 1–4)
LYMPHOCYTES NFR BLD AUTO: 30.3 %
MCH RBC QN AUTO: 31.9 PG (ref 26–34)
MCHC RBC AUTO-ENTMCNC: 36.3 G/DL (ref 31–37)
MCV RBC AUTO: 87.9 FL (ref 80–100)
MIXED CELL %: 9.6 %
NEUTROPHILS # BLD AUTO: 3.4 X10ˆ3/UL (ref 1.5–7.7)
NEUTROPHILS NFR BLD AUTO: 60.1 %
PLATELET # BLD AUTO: 160 X10ˆ3/UL (ref 150–450)
POCT BILIRUBIN URINE: NEGATIVE
POCT GLUCOSE URINE: 100 MG/DL
POCT KETONE URINE: NEGATIVE MG/DL
POCT LEUKOCYTE ESTERASE URINE: NEGATIVE
POCT NITRITE URINE: NEGATIVE
POCT PH URINE: 5.5 (ref 5–8)
POCT SPECIFIC GRAVITY URINE: 1.03
POCT URINE CLARITY: CLEAR
POCT URINE COLOR: YELLOW
POCT UROBILINOGEN URINE: 0.2 MG/DL
RBC # BLD AUTO: 4.48 X10ˆ6/UL (ref 4.3–5.7)
WBC # BLD AUTO: 5.8 X10ˆ3/UL (ref 4–11)

## 2019-09-26 PROCEDURE — 81002 URINALYSIS NONAUTO W/O SCOPE: CPT | Performed by: FAMILY MEDICINE

## 2019-09-26 PROCEDURE — 80047 BASIC METABLC PNL IONIZED CA: CPT

## 2019-09-26 PROCEDURE — 99283 EMERGENCY DEPT VISIT LOW MDM: CPT

## 2019-09-26 PROCEDURE — 99284 EMERGENCY DEPT VISIT MOD MDM: CPT

## 2019-09-26 PROCEDURE — 99214 OFFICE O/P EST MOD 30 MIN: CPT

## 2019-09-26 PROCEDURE — 85025 COMPLETE CBC W/AUTO DIFF WBC: CPT | Performed by: FAMILY MEDICINE

## 2019-09-26 PROCEDURE — 36415 COLL VENOUS BLD VENIPUNCTURE: CPT

## 2019-09-26 PROCEDURE — 74176 CT ABD & PELVIS W/O CONTRAST: CPT | Performed by: FAMILY MEDICINE

## 2019-09-26 RX ORDER — DICYCLOMINE HCL 20 MG
20 TABLET ORAL 4 TIMES DAILY PRN
Qty: 30 TABLET | Refills: 0 | Status: SHIPPED | OUTPATIENT
Start: 2019-09-26 | End: 2019-10-24

## 2019-09-26 NOTE — ED PROVIDER NOTES
Patient Seen in: [id number] [facility]      History   Patient presents with:  Abdomen/Flank Pain (GI/)    Stated Complaint: abd side pain    HPI  70-year-old female presents to the immediate care today with 1 day history of left-sided abd Yaya Torres MD at [address] [address]   • ESOPHAGOGASTRODUODENOSCOPY (EGD) N/A 4/23/2019    Performed by Nena Smith MD at [address] [facility] [address] Right 8/5/2015    Performed by Katie Go MD at [facility] MAIN OR   • Conjunctiva normal.  Cornea clear. Ears: normal pending membranes. Normal external auditory canals   Nose: Nasal mucosa. No sinus tenderness. No nasal congestion. Throat: Oropharynx noninjected. No lip, tongue, throat swelling.  Buccal mucosa moist: adequate prosthetic alignment. Mild generalized soft tissue swelling. Patellar enthesopathy noted. CONCLUSION:  No evidence of acute displaced fracture or dislocation in the right knee. Mild soft tissue swelling.   Dictated by: Ramana Phipps MD on findings of acute appendicitis. No ascites. ABDOMINAL WALL:  Stable. No focal hernia. URINARY BLADDER:  Distended with high attenuation fluid within. Hounsfield units recorded at 30-35. No wall thickening or calculus. PELVIC NODES:  Stable.   None enlar emergency room for further treatment. He wants to go to [facility] ER. ED will be notified. He is stable to go by car. Patient was discharged from clinic in stable condition.           Disposition and Plan     Clinical Impression:  Left sided abdominal pain

## 2019-09-26 NOTE — ED INITIAL ASSESSMENT (HPI)
Onset yesterday of left middle abd pain. States it was intermittent but today it is constant, tender and increases with inspiration.

## 2019-09-27 NOTE — ED PROVIDER NOTES
Patient Seen in: [facility] Emergency Department      History   Patient presents with:  Abdomen/Flank Pain (GI/)    Stated Complaint: left sided abdominal pain from [address] [place] [address] is a 60-year-old male who presents today for evaluatio MD at [address] [place] [address] Right 8/5/2015    Performed by Corinna Díaz MD at [facility] MAIN OR   • FOREARM/WRIST SURGERY UNLISTED Bilateral 1999    carpal tunnel R wrist, l wrist   • KNEE TOTAL REPLACEMENT Right 9/19/2018 all 4 quadrants, non-distended in appearance without skin changes. Soft. Tender to palpation in the lower left and upper left side of the abdomen. No CVAT. Back: Normal in appearance, normal ROM  Musculoskeletal: Normal range of motion.  No edema or te and 0.7 cm on the left. No new perinephric stranding or hydronephrosis. No ureteral calculus. ADRENALS:  Stable. Not enlarged. AORTA/VASCULAR:    Stable. No abdominal aortic aneurysm. Circumaortic left renal vein, anatomic variation.  RETROPERITONEUM: remains so upon discharge. I discuss the plan of care with the patient, who expresses understanding. All questions and concerns are addressed to the patient's satisfaction prior to discharge today.   Disposition and Plan     Clinical Impression:  Abdomina

## 2019-09-28 ENCOUNTER — TELEPHONE (OUTPATIENT)
Dept: FAMILY MEDICINE CLINIC | Facility: CLINIC | Age: 59
End: 2019-09-28

## 2019-09-28 NOTE — TELEPHONE ENCOUNTER
I hate to say it but I think he probably needs to go back to either the IC or possibly ER, he did  Have some blood in his urine, and his CT suggested some debris in his bladder? Maybe kidney stones? But clearly there is something else going on.

## 2019-09-28 NOTE — TELEPHONE ENCOUNTER
Pt called and is having some abdomin pains. He went to the urgent care Friday. They told him to F/U with a gastro. He can't get in until 10/7 and is very uncomfortable.  Please call back

## 2019-09-28 NOTE — TELEPHONE ENCOUNTER
Patient states he is still having left lateral pain. Had dull ache Wednesday night but by Thursday he was having worsening pain and it hurt to yawn, cough or sneeze. States the left side of his abd looks swollen.   States the dicyclomine is not helping at

## 2019-10-01 ENCOUNTER — HOSPITAL ENCOUNTER (EMERGENCY)
Facility: HOSPITAL | Age: 59
Discharge: HOME | End: 2019-10-01
Attending: EMERGENCY MEDICINE
Payer: COMMERCIAL

## 2019-10-01 VITALS
SYSTOLIC BLOOD PRESSURE: 124 MMHG | DIASTOLIC BLOOD PRESSURE: 76 MMHG | BODY MASS INDEX: 33 KG/M2 | TEMPERATURE: 98 F | HEART RATE: 66 BPM | RESPIRATION RATE: 20 BRPM | WEIGHT: 233.69 LBS | OXYGEN SATURATION: 94 %

## 2019-10-01 DIAGNOSIS — R10.9 LEFT FLANK PAIN: Primary | ICD-10-CM

## 2019-10-01 PROCEDURE — 83690 ASSAY OF LIPASE: CPT | Performed by: EMERGENCY MEDICINE

## 2019-10-01 PROCEDURE — 99283 EMERGENCY DEPT VISIT LOW MDM: CPT

## 2019-10-01 PROCEDURE — 36415 COLL VENOUS BLD VENIPUNCTURE: CPT

## 2019-10-01 PROCEDURE — 85025 COMPLETE CBC W/AUTO DIFF WBC: CPT | Performed by: EMERGENCY MEDICINE

## 2019-10-01 PROCEDURE — 80053 COMPREHEN METABOLIC PANEL: CPT | Performed by: EMERGENCY MEDICINE

## 2019-10-01 NOTE — ED INITIAL ASSESSMENT (HPI)
Per patient, LLQ pain that radiates to his left flank.  Patient has been seen twice for this pain and was told to follow up with GI doctor, but is unable to get in to see him until Monday at 1:15 pm. Patient would like to know what is going on so he could s

## 2019-10-01 NOTE — ED NOTES
APCT unable to obtain IV access, but rayne blood ok. Sent blood to lab. Patient would not allow for second attempt of IV access.

## 2019-10-01 NOTE — ED PROVIDER NOTES
Patient Seen in: [facility] Emergency Department      History   Patient presents with:  Abdomen/Flank Pain (GI/)    Stated Complaint: left flank pain seen thursday for same, states no improvement.     HPI    Patient is a 51-year-old male, presentin ANESTH,SHOULDER REPLACEMENT Left 12/20/18--Dr. Chuck Nguyen    total shoulder arthroplasty   • APPENDECTOMY      rupture   • CHOLECYSTECTOMY  10/2003   • ENTEROSCOPY N/A 4/23/2019    Performed by Sanjeev Bonilla MD at [facility] ENDOSCOPY   • ESOPHAGOGASTRODUODENOSCOPY is awake and alert, resting comfortably on the cart, in no apparent distress. HEENT: Head is without evidence of trauma. Extraocular muscles are intact. Pupils are equal and reactive to light.  Oropharynx is pink and moist.  NECK: Neck is supple and nonte RAINBOW DRAW GOLD          Ct Abdomen+pelvis(cpt=74176)    Result Date: 9/26/2019  PROCEDURE:  CT ABDOMEN+PELVIS (ZQR=03477)  COMPARISON:  [address], CT ABDOMEN+PELVIS KIDNEYSTONE 2D RNDR(NO IV,NO ORAL)(CPT=74176), 4/24/2019, 17:54.   I prostatomegaly. BONES:  Stable. Moderate to severe degenerative changes. A few mm anterolisthesis L4 on L5. Severe L4-L5 and L5-S1 facet arthropathy. LUNG BASES:  Stable. Mild scarring. No new consolidation or pleural effusion. OTHER:  None.       CONC Narda Grant [id number]  [phone]    Schedule an appointment as soon as possible for a visit in 3 days          Medications Prescribed:  Current Discharge Medication List

## 2019-10-24 ENCOUNTER — OFFICE VISIT (OUTPATIENT)
Dept: FAMILY MEDICINE CLINIC | Facility: CLINIC | Age: 59
End: 2019-10-24
Payer: COMMERCIAL

## 2019-10-24 VITALS
WEIGHT: 232 LBS | SYSTOLIC BLOOD PRESSURE: 130 MMHG | BODY MASS INDEX: 32 KG/M2 | RESPIRATION RATE: 20 BRPM | HEART RATE: 87 BPM | OXYGEN SATURATION: 97 % | DIASTOLIC BLOOD PRESSURE: 92 MMHG | TEMPERATURE: 99 F

## 2019-10-24 DIAGNOSIS — T84.098A OTHER MECHANICAL COMPLICATION OF INTERNAL SHOULDER JOINT PROSTHESIS (HCC): ICD-10-CM

## 2019-10-24 DIAGNOSIS — T84.50XA PROSTHETIC JOINT INFECTION, INITIAL ENCOUNTER (HCC): ICD-10-CM

## 2019-10-24 DIAGNOSIS — Z96.619 OTHER MECHANICAL COMPLICATION OF INTERNAL SHOULDER JOINT PROSTHESIS (HCC): ICD-10-CM

## 2019-10-24 DIAGNOSIS — R05.9 COUGH: Primary | ICD-10-CM

## 2019-10-24 PROBLEM — Z86.718 PERSONAL HISTORY OF DVT (DEEP VEIN THROMBOSIS): Status: ACTIVE | Noted: 2019-10-09

## 2019-10-24 PROBLEM — Z90.49 HISTORY OF CHOLECYSTECTOMY: Status: ACTIVE | Noted: 2019-10-03

## 2019-10-24 PROCEDURE — 99214 OFFICE O/P EST MOD 30 MIN: CPT | Performed by: FAMILY MEDICINE

## 2019-10-24 RX ORDER — HYDROCODONE BITARTRATE AND ACETAMINOPHEN 5; 325 MG/1; MG/1
1-2 TABLET ORAL NIGHTLY
COMMUNITY
Start: 2019-10-10 | End: 2020-02-13 | Stop reason: ALTCHOICE

## 2019-10-24 NOTE — PROGRESS NOTES
Casimiro Corea is a 62year old male.  Patient presents with:  Cough: per pt, non productive cough for over 2 weeks  Nasal Congestion: began yesterday    HPI:   Edie Ortiz presents to the office with complaints of upper respiratory tract infection, having con surgery   • Bowel obstruction [facility]) March 2015   • Calculus of kidney    • Cellulitis    • Conjunctivitis    • Deep vein thrombosis ([facility] [address] 2011    left arm, after bowel obstruction, medicine related per patient   • Depression    • Neuropathy     \"whole bod • Diabetes Brother    • Other (rheumatoid arthritis) Maternal Grandmother       Social History    Tobacco Use      Smoking status: Never Smoker      Smokeless tobacco: Never Used    Alcohol use: Not Currently      Alcohol/week: 0.0 standard drinks      C encounter.       Meds & Refills for this Visit:  Requested Prescriptions      No prescriptions requested or ordered in this encounter       Imaging & Consults:  None        #[id number]

## 2019-11-04 ENCOUNTER — TELEPHONE (OUTPATIENT)
Dept: FAMILY MEDICINE CLINIC | Facility: CLINIC | Age: 59
End: 2019-11-04

## 2019-11-04 DIAGNOSIS — J32.9 SINUSITIS, UNSPECIFIED CHRONICITY, UNSPECIFIED LOCATION: Primary | ICD-10-CM

## 2019-11-04 RX ORDER — AZITHROMYCIN 250 MG/1
TABLET, FILM COATED ORAL
Qty: 6 TABLET | Refills: 0 | Status: SHIPPED | OUTPATIENT
Start: 2019-11-04 | End: 2020-02-13 | Stop reason: ALTCHOICE

## 2019-11-04 NOTE — TELEPHONE ENCOUNTER
Patient states that he was up coughing all night. He called the answering service and left a message but no one called him back. This morning he started with sinus issues. Can Dr Aide Brizuela call something in for him. No other sxs.

## 2019-11-04 NOTE — TELEPHONE ENCOUNTER
Spoke with patient who states he was in to see KE last week for cough and was told to try inhaler and antihistamine. States he was getting better until last night when he started coughing so bad he was dry heaving. Is still coughing today.   States no feve

## 2019-12-12 ENCOUNTER — OFFICE VISIT (OUTPATIENT)
Dept: FAMILY MEDICINE CLINIC | Facility: CLINIC | Age: 59
End: 2019-12-12
Payer: COMMERCIAL

## 2019-12-12 VITALS
SYSTOLIC BLOOD PRESSURE: 124 MMHG | HEART RATE: 74 BPM | OXYGEN SATURATION: 96 % | HEIGHT: 70 IN | WEIGHT: 233.38 LBS | DIASTOLIC BLOOD PRESSURE: 80 MMHG | TEMPERATURE: 98 F | BODY MASS INDEX: 33.41 KG/M2

## 2019-12-12 DIAGNOSIS — G47.9 SLEEP DISTURBANCE: ICD-10-CM

## 2019-12-12 DIAGNOSIS — G47.33 OSA ON CPAP: Primary | ICD-10-CM

## 2019-12-12 DIAGNOSIS — Z99.89 OSA ON CPAP: Primary | ICD-10-CM

## 2019-12-12 PROCEDURE — 99214 OFFICE O/P EST MOD 30 MIN: CPT | Performed by: FAMILY MEDICINE

## 2019-12-12 NOTE — PROGRESS NOTES
[facility]        HPI:   This is a 62year old male coming in for Patient presents with:  Obstructive Sleep Apnea (ALTON):  Follw-Up      HPI:  Had difficulty with using his cpap  because he was coughing so bad at night Strap CP No No   Type of CPAPmachine respironics -   DME Provider Koko Goldberg.  -       Lab Results   Component Value Date    IRON 90 03/27/2018    TIBCP 260 (L) 03/27/2018     No results found for: TERRI  No results found for: VITD, QVITD, VITD25, FRFR29JT  Lab MD at [facility] MAIN OR   • SPINE SURGERY PROCEDURE UNLISTED  3/2011    cervical vertebral fusion   • TOTAL KNEE REPLACEMENT Right    • VASECTOMY  1999     Social History:  Social History    Patient does not qualify to have social determinant information on file Answered         Problem List:  Patient Active Problem List:     Sleep disturbance     Major depressive disorder, single episode     Paralysis of vocal cords or larynx, unspecified     ALTON on CPAP     Idiopathic peripheral neuropathy     Gout     Neurocogn 124/76    Ideal body weight: 73 kg (160 lb 15 oz)  Adjusted ideal body weight: 86.1 kg (189 lb 14.8 oz)    Vital signs reviewed. Physical Exam   Constitutional: He is oriented to person, place, and time. He appears well-developed and well-nourished.    HEN patient to change filters,masks,hoses  and tubes and equiptment on a  regular schedule.   Filters and seals shall be changed every 1 month,  Hoses every 3 months,   CPAP mask and humidifier  chamber changed every 6 month  with the Durable medical equipment

## 2019-12-17 ENCOUNTER — HOSPITAL ENCOUNTER (OUTPATIENT)
Dept: ULTRASOUND IMAGING | Facility: HOSPITAL | Age: 59
Discharge: HOME | End: 2019-12-17
Attending: ORTHOPAEDIC SURGERY
Payer: COMMERCIAL

## 2019-12-17 DIAGNOSIS — M25.461 EFFUSION OF RIGHT KNEE: ICD-10-CM

## 2019-12-17 DIAGNOSIS — Z96.651 STATUS POST TOTAL RIGHT KNEE REPLACEMENT: ICD-10-CM

## 2019-12-17 DIAGNOSIS — Z96.651 STATUS POST REVISION OF TOTAL KNEE REPLACEMENT, RIGHT: ICD-10-CM

## 2019-12-17 PROCEDURE — 76882 US LMTD JT/FCL EVL NVASC XTR: CPT | Performed by: ORTHOPAEDIC SURGERY

## 2019-12-31 ENCOUNTER — HOSPITAL ENCOUNTER (OUTPATIENT)
Dept: GENERAL RADIOLOGY | Age: 59
Discharge: HOME | End: 2019-12-31
Attending: ORTHOPAEDIC SURGERY
Payer: COMMERCIAL

## 2019-12-31 ENCOUNTER — TELEPHONE (OUTPATIENT)
Dept: FAMILY MEDICINE CLINIC | Facility: CLINIC | Age: 59
End: 2019-12-31

## 2019-12-31 DIAGNOSIS — Z96.651 STATUS POST TOTAL RIGHT KNEE REPLACEMENT: ICD-10-CM

## 2019-12-31 DIAGNOSIS — F32.1 CURRENT MODERATE EPISODE OF MAJOR DEPRESSIVE DISORDER WITHOUT PRIOR EPISODE (HCC): ICD-10-CM

## 2019-12-31 DIAGNOSIS — G89.29 CHRONIC PAIN OF RIGHT KNEE: ICD-10-CM

## 2019-12-31 DIAGNOSIS — G89.29 CHRONIC LEFT SHOULDER PAIN: Primary | ICD-10-CM

## 2019-12-31 DIAGNOSIS — M25.561 CHRONIC PAIN OF RIGHT KNEE: ICD-10-CM

## 2019-12-31 DIAGNOSIS — M25.512 CHRONIC LEFT SHOULDER PAIN: Primary | ICD-10-CM

## 2019-12-31 DIAGNOSIS — Z98.890 H/O SHOULDER SURGERY: ICD-10-CM

## 2019-12-31 PROCEDURE — 73560 X-RAY EXAM OF KNEE 1 OR 2: CPT | Performed by: ORTHOPAEDIC SURGERY

## 2019-12-31 PROCEDURE — 73502 X-RAY EXAM HIP UNI 2-3 VIEWS: CPT | Performed by: ORTHOPAEDIC SURGERY

## 2019-12-31 RX ORDER — DESVENLAFAXINE 50 MG/1
50 TABLET, EXTENDED RELEASE ORAL DAILY
Qty: 90 TABLET | Refills: 0 | Status: SHIPPED | OUTPATIENT
Start: 2019-12-31 | End: 2020-02-17

## 2019-12-31 NOTE — TELEPHONE ENCOUNTER
Forward to Dr. Ada Boyce, please advise on increase of desvenlafaxine ER 25mg and referral to Dr. Jessy Alberts, ortho from [place].

## 2019-12-31 NOTE — TELEPHONE ENCOUNTER
We can increase the dose of the dose of the desvenlafaxine. Follow up with me in 6 weeks in the office. I sent in a script for 50 mg once daily. Referral placed for ortho.

## 2019-12-31 NOTE — TELEPHONE ENCOUNTER
PT STOPPED IN AND ADV THAT SWITCHING FROM A PPO TO HMO THE 1ST OF THE YEAR.     PT NEEDS NEW REFERRAL FOR   DR ISAAC RAMIREZ FROM [facility]   LEFT [facility]    PT ADV DOES NOT HAVE NEW INS CARD WITH HIM.    ---------------------------------------------------

## 2020-01-06 ENCOUNTER — TELEPHONE (OUTPATIENT)
Dept: FAMILY MEDICINE CLINIC | Facility: CLINIC | Age: 60
End: 2020-01-06

## 2020-01-06 DIAGNOSIS — Z96.619 HISTORY OF REVISION OF TOTAL SHOULDER ARTHROPLASTY: ICD-10-CM

## 2020-01-06 DIAGNOSIS — Z09 POSTOPERATIVE FOLLOW-UP: Primary | ICD-10-CM

## 2020-01-06 DIAGNOSIS — Z96.612 STATUS POST REVERSE TOTAL REPLACEMENT OF LEFT SHOULDER: ICD-10-CM

## 2020-01-06 NOTE — TELEPHONE ENCOUNTER
[facility]    NEED TO PLACE REFERRAL TO DR ISAAC RAMIREZ FROM [facility].    ALSO, PT NEEDS NEW REFERRAL FOR PHYSICAL THERAPY.     PT HAS BEEN GOING TO [facility], CAN PT CONTINUE GOING TO [facility] OR DOES THAT NEED TO SWITCH DUE TO

## 2020-01-06 NOTE — TELEPHONE ENCOUNTER
Pt was told he needs to have a dex scan done by . Due to his new ins pt is asking that we place the order.

## 2020-01-06 NOTE — TELEPHONE ENCOUNTER
Patient states Dr Ivis Cameron is the orthopedic provider who performed his shoulder surgery 10/9/19. Has appt scheduled in February. States he is doing PT at [facility] for the shoulder surgery.   Patient advised [facility] is not a covered provider and he may need to switch

## 2020-01-07 NOTE — TELEPHONE ENCOUNTER
Spoke with Grecia in [facility] referral department to see what additional info is needed to process referral as patient was under the care of these providers prior to switching insurance at the first of the year.   Per Grecia, will need copy of [facility] last OV note

## 2020-01-07 NOTE — TELEPHONE ENCOUNTER
OV note received from Dr Baxter Camera office and [facility].     Information has been faxed to [phone] [facility] on outcome

## 2020-01-09 NOTE — TELEPHONE ENCOUNTER
Spoke with Fausto Nicholson in [facility] and advised KE would like to speak with medical director. Per Fausto Nicholson, she will notified Dr Josiah Fuentes of provider wish to do appeal.  Denver Mellow will have to contact medical director at number provided by Fausto Nicholson.

## 2020-01-09 NOTE — TELEPHONE ENCOUNTER
Patient notified and verbalized understanding. Number to [address] provided. Patient will see which provider he can see and let office know so new referral can be entered.        Hold to watch for PT referral

## 2020-01-09 NOTE — TELEPHONE ENCOUNTER
Referral for Dr Fiona Mckee was denied.      From: Ria Yao  Sent: 1/9/2020   8:02 AM CST  To: Clinton Gilbert Clinical Staff, *     Upon review with Medical Leadership, the request for Emir Herrera is not deemed medically necessary at this time.

## 2020-01-09 NOTE — TELEPHONE ENCOUNTER
I spoke with Dr. Hodan Coombs. Since this is not an acute issue and he is doing well and was recommended for routine follow up, that does not justify going out of network. Pls let pt know that if he can follow up with another in-network ortho provider.  The

## 2020-01-09 NOTE — TELEPHONE ENCOUNTER
Per tara ESPOSITO to refer to [address] [place] [address], ph: [phone]    Left message on voicemail/answering machine for patient to call office

## 2020-01-10 NOTE — TELEPHONE ENCOUNTER
Per referral notes, medical director denied request for continuation of therapy at [facility]. Will need to use in network provider for insurance to cover.      Routed to  for order

## 2020-01-10 NOTE — TELEPHONE ENCOUNTER
Patient notified and verbalized understanding. Patient states his wife was able to switch back to the PPO so he should be able to see his current providers.

## 2020-01-13 ENCOUNTER — TELEPHONE (OUTPATIENT)
Dept: FAMILY MEDICINE CLINIC | Facility: CLINIC | Age: 60
End: 2020-01-13

## 2020-01-16 ENCOUNTER — MED REC SCAN ONLY (OUTPATIENT)
Dept: FAMILY MEDICINE CLINIC | Facility: CLINIC | Age: 60
End: 2020-01-16

## 2020-01-24 ENCOUNTER — HOSPITAL ENCOUNTER (OUTPATIENT)
Dept: NUCLEAR MEDICINE | Facility: HOSPITAL | Age: 60
Discharge: HOME | End: 2020-01-24
Attending: ORTHOPAEDIC SURGERY
Payer: COMMERCIAL

## 2020-01-24 DIAGNOSIS — Z96.651 STATUS POST TOTAL RIGHT KNEE REPLACEMENT: ICD-10-CM

## 2020-01-24 PROCEDURE — 78306 BONE IMAGING WHOLE BODY: CPT | Performed by: ORTHOPAEDIC SURGERY

## 2020-01-24 PROCEDURE — 78832 RP LOCLZJ TUM SPECT W/CT 2: CPT | Performed by: ORTHOPAEDIC SURGERY

## 2020-02-05 ENCOUNTER — APPOINTMENT (OUTPATIENT)
Dept: LAB | Age: 60
End: 2020-02-05
Attending: ORTHOPAEDIC SURGERY
Payer: COMMERCIAL

## 2020-02-05 DIAGNOSIS — Z96.651 STATUS POST TOTAL RIGHT KNEE REPLACEMENT: ICD-10-CM

## 2020-02-05 LAB
CRP SERPL-MCNC: 0.56 MG/DL (ref ?–0.3)
SED RATE-ML: 8 MM/HR (ref 0–12)

## 2020-02-05 PROCEDURE — 86140 C-REACTIVE PROTEIN: CPT

## 2020-02-05 PROCEDURE — 85652 RBC SED RATE AUTOMATED: CPT

## 2020-02-11 ENCOUNTER — TELEPHONE (OUTPATIENT)
Dept: FAMILY MEDICINE CLINIC | Facility: CLINIC | Age: 60
End: 2020-02-11

## 2020-02-11 NOTE — TELEPHONE ENCOUNTER
Patient states he has had the cough for 2 days. No fever, SOB or Wheezing. Has a little bit of sinus drainage  States he does not want it to get any worse.     Routed to  to advise

## 2020-02-11 NOTE — TELEPHONE ENCOUNTER
I wouldn't do an antibiotic at this time. He can start flonase if not on it already and start some mucinex and that can help prevent it from becoming a full blown sinus infection. He can try neti pot as well.    Let me know if getting worse in the next week

## 2020-02-11 NOTE — TELEPHONE ENCOUNTER
Pt is cough a lot, He would like to know his he can get a z-linh  [facility] [address]  Please return call to [phone]

## 2020-02-11 NOTE — TELEPHONE ENCOUNTER
Patient notified and verbalized understanding. Already has appt next week and will discuss with KE if not better at that time.

## 2020-02-13 ENCOUNTER — OFFICE VISIT (OUTPATIENT)
Dept: FAMILY MEDICINE CLINIC | Facility: CLINIC | Age: 60
End: 2020-02-13
Payer: COMMERCIAL

## 2020-02-13 VITALS
OXYGEN SATURATION: 94 % | SYSTOLIC BLOOD PRESSURE: 132 MMHG | TEMPERATURE: 98 F | BODY MASS INDEX: 34.41 KG/M2 | RESPIRATION RATE: 14 BRPM | WEIGHT: 245.81 LBS | HEART RATE: 72 BPM | HEIGHT: 71 IN | DIASTOLIC BLOOD PRESSURE: 80 MMHG

## 2020-02-13 DIAGNOSIS — Z99.89 OSA ON CPAP: Primary | ICD-10-CM

## 2020-02-13 DIAGNOSIS — G47.33 OSA ON CPAP: Primary | ICD-10-CM

## 2020-02-13 PROCEDURE — 99214 OFFICE O/P EST MOD 30 MIN: CPT | Performed by: FAMILY MEDICINE

## 2020-02-13 RX ORDER — PENICILLIN V POTASSIUM 250 MG/1
TABLET ORAL
COMMUNITY
Start: 2020-02-04 | End: 2020-05-04

## 2020-02-13 NOTE — PATIENT INSTRUCTIONS
Clean regularly once a month in vinegar bath. 1 cup of vinegar in 1 gallon of water and soak x 30 minutes. Rinse thoroughly, and dry. How to avoid insomnia  1. Wake up at the same time each day. Maintaining a regular sleep schedule is important to watch TV, catch up on work or listen to music while in bed. All these activities can increase alertness and make it difficult to fall asleep. 7. Do not eat or drink right before going to bed. Eating a late dinner or snacking before going to bed can activat

## 2020-02-13 NOTE — PROGRESS NOTES
[facility]        HPI:   This is a 61year old male coming in for Patient presents with:  Obstructive Sleep Apnea (ALTON): Sleep FU      HPI: pt is using his cpap regularly.   He notes that he doesn't like the plastics waking with  cervical fusion   • Back problem     cervical surgery   • Bowel obstruction [facility]) March 2015   • Calculus of kidney    • Cellulitis    • Conjunctivitis    • Deep vein thrombosis ([facility] [address] 2011    left arm, after bowel obstruction, medicine related Alcohol use: Not Currently      Alcohol/week: 0.0 standard drinks      Comment: 10 drinks per year    Drug use: No    Family History:  Family History   Problem Relation Age of Onset   • Cancer Mother         brain   • Cancer Paternal Grandfather         b encounter     Swallowed foreign body, initial encounter     History of cholecystectomy     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[facility] [address]     Personal history of DVT (deep vein thrombosis)     Prosthetic joint infection, initial months,   CPAP mask and humidifier  chamber changed every 6 month  with the Durable medical equipment provider.          Meds & Refills for this Visit:  Requested Prescriptions      No prescriptions requested or ordered in this encounter       Outcome: Evelyn Laws

## 2020-02-17 ENCOUNTER — OFFICE VISIT (OUTPATIENT)
Dept: FAMILY MEDICINE CLINIC | Facility: CLINIC | Age: 60
End: 2020-02-17
Payer: COMMERCIAL

## 2020-02-17 VITALS
DIASTOLIC BLOOD PRESSURE: 80 MMHG | WEIGHT: 237 LBS | RESPIRATION RATE: 16 BRPM | BODY MASS INDEX: 33.18 KG/M2 | SYSTOLIC BLOOD PRESSURE: 130 MMHG | HEART RATE: 72 BPM | HEIGHT: 71 IN | TEMPERATURE: 97 F

## 2020-02-17 DIAGNOSIS — Z00.01 ENCOUNTER FOR ROUTINE ADULT PHYSICAL EXAM WITH ABNORMAL FINDINGS: Primary | ICD-10-CM

## 2020-02-17 DIAGNOSIS — R05.9 COUGH: ICD-10-CM

## 2020-02-17 DIAGNOSIS — Z12.11 SCREENING FOR COLON CANCER: ICD-10-CM

## 2020-02-17 DIAGNOSIS — J01.20 ACUTE NON-RECURRENT ETHMOIDAL SINUSITIS: ICD-10-CM

## 2020-02-17 DIAGNOSIS — F32.1 CURRENT MODERATE EPISODE OF MAJOR DEPRESSIVE DISORDER WITHOUT PRIOR EPISODE (HCC): ICD-10-CM

## 2020-02-17 LAB
ALBUMIN SERPL-MCNC: 3.9 G/DL (ref 3.4–5)
ALBUMIN/GLOB SERPL: 0.9 {RATIO} (ref 1–2)
ALP LIVER SERPL-CCNC: 104 U/L (ref 45–117)
ALT SERPL-CCNC: 87 U/L (ref 16–61)
ANION GAP SERPL CALC-SCNC: 4 MMOL/L (ref 0–18)
AST SERPL-CCNC: 65 U/L (ref 15–37)
BASOPHILS # BLD AUTO: 0.03 X10(3) UL (ref 0–0.2)
BASOPHILS NFR BLD AUTO: 0.5 %
BILIRUB SERPL-MCNC: 0.5 MG/DL (ref 0.1–2)
BUN BLD-MCNC: 15 MG/DL (ref 7–18)
BUN/CREAT SERPL: 18.8 (ref 10–20)
CALCIUM BLD-MCNC: 10.3 MG/DL (ref 8.5–10.1)
CHLORIDE SERPL-SCNC: 109 MMOL/L (ref 98–112)
CHOLEST SMN-MCNC: 161 MG/DL (ref ?–200)
CO2 SERPL-SCNC: 29 MMOL/L (ref 21–32)
COMPLEXED PSA SERPL-MCNC: 0.39 NG/ML (ref ?–4)
CREAT BLD-MCNC: 0.8 MG/DL (ref 0.7–1.3)
DEPRECATED RDW RBC AUTO: 43.9 FL (ref 35.1–46.3)
EOSINOPHIL # BLD AUTO: 0.21 X10(3) UL (ref 0–0.7)
EOSINOPHIL NFR BLD AUTO: 3.2 %
ERYTHROCYTE [DISTWIDTH] IN BLOOD BY AUTOMATED COUNT: 13.8 % (ref 11–15)
GLOBULIN PLAS-MCNC: 4.5 G/DL (ref 2.8–4.4)
GLUCOSE BLD-MCNC: 106 MG/DL (ref 70–99)
HCT VFR BLD AUTO: 46.9 % (ref 39–53)
HDLC SERPL-MCNC: 32 MG/DL (ref 40–59)
HGB BLD-MCNC: 16 G/DL (ref 13–17.5)
IMM GRANULOCYTES # BLD AUTO: 0.09 X10(3) UL (ref 0–1)
IMM GRANULOCYTES NFR BLD: 1.4 %
LDLC SERPL CALC-MCNC: 104 MG/DL (ref ?–100)
LYMPHOCYTES # BLD AUTO: 2.71 X10(3) UL (ref 1–4)
LYMPHOCYTES NFR BLD AUTO: 40.9 %
M PROTEIN MFR SERPL ELPH: 8.4 G/DL (ref 6.4–8.2)
MCH RBC QN AUTO: 30 PG (ref 26–34)
MCHC RBC AUTO-ENTMCNC: 34.1 G/DL (ref 31–37)
MCV RBC AUTO: 87.8 FL (ref 80–100)
MONOCYTES # BLD AUTO: 0.52 X10(3) UL (ref 0.1–1)
MONOCYTES NFR BLD AUTO: 7.8 %
NEUTROPHILS # BLD AUTO: 3.07 X10 (3) UL (ref 1.5–7.7)
NEUTROPHILS # BLD AUTO: 3.07 X10(3) UL (ref 1.5–7.7)
NEUTROPHILS NFR BLD AUTO: 46.2 %
NONHDLC SERPL-MCNC: 129 MG/DL (ref ?–130)
OSMOLALITY SERPL CALC.SUM OF ELEC: 295 MOSM/KG (ref 275–295)
PATIENT FASTING Y/N/NP: YES
PATIENT FASTING Y/N/NP: YES
PLATELET # BLD AUTO: 150 10(3)UL (ref 150–450)
POTASSIUM SERPL-SCNC: 5 MMOL/L (ref 3.5–5.1)
RBC # BLD AUTO: 5.34 X10(6)UL (ref 4.3–5.7)
SODIUM SERPL-SCNC: 142 MMOL/L (ref 136–145)
TRIGL SERPL-MCNC: 126 MG/DL (ref 30–149)
VLDLC SERPL CALC-MCNC: 25 MG/DL (ref 0–30)
WBC # BLD AUTO: 6.6 X10(3) UL (ref 4–11)

## 2020-02-17 PROCEDURE — 99213 OFFICE O/P EST LOW 20 MIN: CPT | Performed by: FAMILY MEDICINE

## 2020-02-17 PROCEDURE — 80053 COMPREHEN METABOLIC PANEL: CPT | Performed by: FAMILY MEDICINE

## 2020-02-17 PROCEDURE — 80061 LIPID PANEL: CPT | Performed by: FAMILY MEDICINE

## 2020-02-17 PROCEDURE — 85025 COMPLETE CBC W/AUTO DIFF WBC: CPT | Performed by: FAMILY MEDICINE

## 2020-02-17 PROCEDURE — 99396 PREV VISIT EST AGE 40-64: CPT | Performed by: FAMILY MEDICINE

## 2020-02-17 RX ORDER — CODEINE PHOSPHATE AND GUAIFENESIN 10; 100 MG/5ML; MG/5ML
5 SOLUTION ORAL 4 TIMES DAILY PRN
Qty: 118 ML | Refills: 0 | Status: SHIPPED | OUTPATIENT
Start: 2020-02-17 | End: 2020-05-04

## 2020-02-17 RX ORDER — AZITHROMYCIN 250 MG/1
TABLET, FILM COATED ORAL
Qty: 6 TABLET | Refills: 0 | Status: SHIPPED | OUTPATIENT
Start: 2020-02-17 | End: 2020-05-04

## 2020-02-17 RX ORDER — DESVENLAFAXINE 50 MG/1
50 TABLET, EXTENDED RELEASE ORAL DAILY
Qty: 90 TABLET | Refills: 0 | Status: SHIPPED | OUTPATIENT
Start: 2020-02-17 | End: 2020-05-30

## 2020-02-17 NOTE — PROGRESS NOTES
Helen Yang is a 61year old male. Patient presents with: Follow - Up: on new meds      HPI:   Shoulder is better. Almost done with therapy. Still has a couple weeks of the penicillin left.      The right knee got infected from the same infection that medicine related per patient   • Depression    • Neuropathy     \"whole body\" per patient   • Osteoarthritis    • Pre-operative cardiovascular examination    • Preoperative examination, unspecified    • Sleep apnea    • Tinea pedis    • Vertigo    • Visua diagnosis)  Cough  Acute non-recurrent ethmoidal sinusitis  Current moderate episode of major depressive disorder without prior episode (hcc)  Screening for colon cancer    Diagnoses and all orders for this visit:    Encounter for routine adult physical ex *Venipuncture              Meds & Refills for this Visit:  Requested Prescriptions     Signed Prescriptions Disp Refills   • azithromycin 250 MG Oral Tab 6 tablet 0     Sig: Take two tablets by mouth today, then one daily.    • guaiFENesin-codeine 100-10 MG

## 2020-04-21 ENCOUNTER — TELEPHONE (OUTPATIENT)
Dept: FAMILY MEDICINE CLINIC | Facility: CLINIC | Age: 60
End: 2020-04-21

## 2020-04-21 ENCOUNTER — APPOINTMENT (OUTPATIENT)
Dept: FAMILY MEDICINE CLINIC | Facility: CLINIC | Age: 60
End: 2020-04-21
Payer: COMMERCIAL

## 2020-04-21 DIAGNOSIS — N30.00 ACUTE CYSTITIS WITHOUT HEMATURIA: Primary | ICD-10-CM

## 2020-04-21 DIAGNOSIS — Z20.9 CONTACT WITH OR EXPOSURE TO COMMUNICABLE DISEASE: Primary | ICD-10-CM

## 2020-04-21 PROCEDURE — 99213 OFFICE O/P EST LOW 20 MIN: CPT | Performed by: FAMILY MEDICINE

## 2020-04-21 RX ORDER — CIPROFLOXACIN 250 MG/1
250 TABLET, FILM COATED ORAL 2 TIMES DAILY
Qty: 10 TABLET | Refills: 0 | Status: SHIPPED | OUTPATIENT
Start: 2020-04-21 | End: 2020-04-26

## 2020-04-21 NOTE — TELEPHONE ENCOUNTER
Virtual Telephone Check-In    Tomasa Graves verbally consents to a Virtual/Telephone Check-In visit on 04/21/20. Patient understands and accepts financial responsibility for any deductible, co-insurance and/or co-pays associated with this service. ESOPHAGOGASTRODUODENOSCOPY (EGD) N/A 4/24/2019    Performed by Corinne Genin, MD at [address] [facility] OR   • ESOPHAGOGASTRODUODENOSCOPY (EGD) N/A 4/23/2019    Performed by Ashlee Walker MD at [address] [facility] Right  started that med. He understands and agrees. Call if getting any worse or not getting better over the next week or less.        Romain Carmona, DO

## 2020-04-21 NOTE — TELEPHONE ENCOUNTER
Spoke to patient. Has had pressure in bladder for 2 weeks. Not really painful. Been urinating more frequently. Cannot hold bladder for long period of time like usual. Drinks something and has to go to bathroom within 2-5 minutes.    Feels like he's empt

## 2020-04-29 ENCOUNTER — TELEPHONE (OUTPATIENT)
Dept: FAMILY MEDICINE CLINIC | Facility: CLINIC | Age: 60
End: 2020-04-29

## 2020-04-29 DIAGNOSIS — R07.89 FEELING OF CHEST TIGHTNESS: ICD-10-CM

## 2020-04-29 RX ORDER — ALBUTEROL SULFATE 90 UG/1
2 AEROSOL, METERED RESPIRATORY (INHALATION) EVERY 6 HOURS PRN
Qty: 1 INHALER | Refills: 3 | Status: SHIPPED | OUTPATIENT
Start: 2020-04-29 | End: 2020-10-16 | Stop reason: ALTCHOICE

## 2020-04-29 NOTE — TELEPHONE ENCOUNTER
Pt called he was cutting his grass on Monday and he started coughing Tuesday thought it was allergies. And now his cough sounds worse and he had dry heaves and sweating at one point. No sore throat, No fever.   He is wanting to know what the Dr recommends to

## 2020-04-29 NOTE — TELEPHONE ENCOUNTER
Patient states he cut the grass Monday. Started coughing on Tuesday. Last night he was coughing so hard he had dry heaves. States he was sweating but had no fever.   Took is dimetapp and cough drop was able to fall asleep from 2-4, woke up and went back t

## 2020-04-29 NOTE — TELEPHONE ENCOUNTER
Patient notified and verbalized understanding. Will get inhaler and continue allergy meds  Will call for video visit if not better in the next few days. Sooner if any worsening.

## 2020-04-29 NOTE — TELEPHONE ENCOUNTER
It sounds like an allergic reaction and asthma. I sent in a refill of his inhaler. Lets see if that helps. If it doesn't and he is not starting to get better in the next couple of days, then lets do a video or phone visit in the next couple of days.

## 2020-05-04 ENCOUNTER — LAB ENCOUNTER (OUTPATIENT)
Dept: LAB | Facility: HOSPITAL | Age: 60
End: 2020-05-04
Attending: FAMILY MEDICINE
Payer: COMMERCIAL

## 2020-05-04 ENCOUNTER — HOSPITAL ENCOUNTER (INPATIENT)
Facility: HOSPITAL | Age: 60
LOS: 3 days | Discharge: HOME | DRG: 177 | End: 2020-05-10
Attending: EMERGENCY MEDICINE | Admitting: INTERNAL MEDICINE
Payer: COMMERCIAL

## 2020-05-04 ENCOUNTER — TELEPHONE (OUTPATIENT)
Dept: FAMILY MEDICINE CLINIC | Facility: CLINIC | Age: 60
End: 2020-05-04

## 2020-05-04 ENCOUNTER — APPOINTMENT (OUTPATIENT)
Dept: GENERAL RADIOLOGY | Facility: HOSPITAL | Age: 60
DRG: 177 | End: 2020-05-04
Attending: EMERGENCY MEDICINE
Payer: COMMERCIAL

## 2020-05-04 DIAGNOSIS — Z20.822 EXPOSURE TO COVID-19 VIRUS: ICD-10-CM

## 2020-05-04 DIAGNOSIS — R09.02 HYPOXIA: ICD-10-CM

## 2020-05-04 DIAGNOSIS — Z20.822 SUSPECTED COVID-19 VIRUS INFECTION: Primary | ICD-10-CM

## 2020-05-04 DIAGNOSIS — Z20.822 EXPOSURE TO COVID-19 VIRUS: Primary | ICD-10-CM

## 2020-05-04 DIAGNOSIS — E11.9 NEW ONSET TYPE 2 DIABETES MELLITUS (HCC): ICD-10-CM

## 2020-05-04 PROBLEM — E87.1 HYPONATREMIA: Status: ACTIVE | Noted: 2020-05-04

## 2020-05-04 PROBLEM — E87.6 HYPOKALEMIA: Status: ACTIVE | Noted: 2020-05-04

## 2020-05-04 PROBLEM — R73.9 HYPERGLYCEMIA: Status: ACTIVE | Noted: 2020-05-04

## 2020-05-04 PROBLEM — D69.6 THROMBOCYTOPENIA: Status: ACTIVE | Noted: 2020-05-04

## 2020-05-04 PROBLEM — D69.6 THROMBOCYTOPENIA (HCC): Status: ACTIVE | Noted: 2020-05-04

## 2020-05-04 PROCEDURE — 71045 X-RAY EXAM CHEST 1 VIEW: CPT | Performed by: EMERGENCY MEDICINE

## 2020-05-04 PROCEDURE — 99220 INITIAL OBSERVATION CARE,LEVL III: CPT | Performed by: HOSPITALIST

## 2020-05-04 RX ORDER — DEXTROSE MONOHYDRATE 25 G/50ML
50 INJECTION, SOLUTION INTRAVENOUS
Status: DISCONTINUED | OUTPATIENT
Start: 2020-05-04 | End: 2020-05-10

## 2020-05-04 RX ORDER — CODEINE PHOSPHATE AND GUAIFENESIN 10; 100 MG/5ML; MG/5ML
5 SOLUTION ORAL EVERY 4 HOURS PRN
Status: DISCONTINUED | OUTPATIENT
Start: 2020-05-04 | End: 2020-05-10

## 2020-05-04 RX ORDER — ALBUTEROL SULFATE 90 UG/1
8 AEROSOL, METERED RESPIRATORY (INHALATION) EVERY 6 HOURS PRN
Status: DISCONTINUED | OUTPATIENT
Start: 2020-05-04 | End: 2020-05-10

## 2020-05-04 RX ORDER — ENOXAPARIN SODIUM 100 MG/ML
40 INJECTION SUBCUTANEOUS NIGHTLY
Status: DISCONTINUED | OUTPATIENT
Start: 2020-05-04 | End: 2020-05-10

## 2020-05-04 RX ORDER — BENZONATATE 100 MG/1
100 CAPSULE ORAL 3 TIMES DAILY PRN
Status: DISCONTINUED | OUTPATIENT
Start: 2020-05-04 | End: 2020-05-10

## 2020-05-04 RX ORDER — ACETAMINOPHEN 325 MG/1
650 TABLET ORAL EVERY 6 HOURS PRN
Status: DISCONTINUED | OUTPATIENT
Start: 2020-05-04 | End: 2020-05-10

## 2020-05-04 RX ORDER — HYDROXYCHLOROQUINE SULFATE 200 MG/1
200 TABLET, FILM COATED ORAL 2 TIMES DAILY
Status: COMPLETED | OUTPATIENT
Start: 2020-05-04 | End: 2020-05-08

## 2020-05-04 RX ORDER — DESVENLAFAXINE 50 MG/1
50 TABLET, EXTENDED RELEASE ORAL NIGHTLY
Status: DISCONTINUED | OUTPATIENT
Start: 2020-05-04 | End: 2020-05-10

## 2020-05-04 NOTE — TELEPHONE ENCOUNTER
covid test ordered. Please follow up with him tomorrow or put him in my schedule for phone visit follow up in 1-2 days. Let me know if he has increased SOB or any other concerning symptoms.

## 2020-05-04 NOTE — ED PROVIDER NOTES
Patient Seen in: [facility] Emergency Department      History   Patient presents with:  Cough/URI  Dyspnea VIKRAM SOB    Stated Complaint: cough x 1 week, shortness of breath    HPI    22-year-old with a history of DVT, kidney stones, bronchitis presen 8/5/2015    Performed by Lora Luo MD at [address] [facility] OR   • FOREARM/WRIST SURGERY UNLISTED Bilateral 1999    carpal tunnel R wrist, l wrist   • KNEE TOTAL REPLACEMENT Right 9/19/2018    Performed by Lora Luo MD at [facility] OR   • LEG/ANKLE SURG Normal strength and sensation bilateral UE and LE. Patient is alert and oriented x3.     ED Course     Labs Reviewed   COMP METABOLIC PANEL (14) - Abnormal; Notable for the following components:       Result Value    Glucose 289 (*)     Sodium 130 (*) RAINBOW DRAW LIGHT GREEN   BLOOD CULTURE   BLOOD CULTURE     EKG    Rate, intervals and axes as noted on EKG Report.   Rate: 101  Rhythm: Sinus Rhythm  Reading: Nonspecific ST changes no dysrhythmia         Chest x-ray: Bibasilar atelectasis with shallow

## 2020-05-04 NOTE — TELEPHONE ENCOUNTER
Looks like he decided to go to ER today just after he called. Please hold to follow up with him tomorrow. Thanks.

## 2020-05-04 NOTE — H&P
RE[facility]I HOSPITALIST  History and Physical     [facility] Patient Status:  Observation    12/15/1960 MRN [id number]   [facility] 3SW-A Attending Ferdinand Siddiqi MD   Hosp Day # 0 PCP Rica Barreto DO     Chief Complaint: sob    History DEBRIDEMENT Right 8/5/2015    Performed by Carol Youssef MD at [facility] MAIN OR   • FOREARM/WRIST SURGERY UNLISTED Bilateral 1999    carpal tunnel R wrist, l wrist   • KNEE TOTAL REPLACEMENT Right 9/19/2018    Performed by Carol Youssef MD at [address] [place] [address] every 6 (six) hours as needed for Shortness of Breath., Disp: 1 Inhaler, Rfl: 3        Review of Systems:   A comprehensive 14 point review of systems was completed. Pertinent positives and negatives noted in the HPI.     Physical Exam:    /60 (BP CTA  3. Hyponatremia  4. Hypokalemia  5. thrombocytopenia  6. Hx UE DVT  7. ALTON  8.  Hx SBO    Quality:  · DVT Prophylaxis: lovenox  · CODE status: full  · Landry: no    Plan of care discussed with pt, ed physician     Lauren Salas MD  5/4/2020

## 2020-05-04 NOTE — TELEPHONE ENCOUNTER
Wanting to have the covid test. Has been around his father who tested positive. Has been sick for a while himself. He is now coughing and vomiting.

## 2020-05-04 NOTE — ED INITIAL ASSESSMENT (HPI)
Patient presents for evaluation of cough and shortness of breath for the past week. He reports he had contact with his father who tested positive for COVID on Friday. He also reports emesis after coughing, sore throat, and \"stiff\" knees.

## 2020-05-04 NOTE — TELEPHONE ENCOUNTER
Patient notified and verbalized understanding. Advised order will be reviewed by central scheduling and they will call him to schedule. If denied our office will call with further instructions. States the cough is what is bothering him.    It was bet

## 2020-05-05 PROCEDURE — 99225 SUBSEQUENT OBSERVATION CARE: CPT | Performed by: HOSPITALIST

## 2020-05-05 RX ORDER — METOCLOPRAMIDE HYDROCHLORIDE 5 MG/ML
10 INJECTION INTRAMUSCULAR; INTRAVENOUS EVERY 6 HOURS PRN
Status: DISCONTINUED | OUTPATIENT
Start: 2020-05-05 | End: 2020-05-10

## 2020-05-05 RX ORDER — ONDANSETRON 4 MG/1
4 TABLET, ORALLY DISINTEGRATING ORAL EVERY 6 HOURS PRN
Status: DISCONTINUED | OUTPATIENT
Start: 2020-05-05 | End: 2020-05-10

## 2020-05-05 RX ORDER — ONDANSETRON 2 MG/ML
4 INJECTION INTRAMUSCULAR; INTRAVENOUS EVERY 6 HOURS PRN
Status: DISCONTINUED | OUTPATIENT
Start: 2020-05-05 | End: 2020-05-10

## 2020-05-05 RX ORDER — POTASSIUM CHLORIDE 20 MEQ/1
40 TABLET, EXTENDED RELEASE ORAL ONCE
Status: COMPLETED | OUTPATIENT
Start: 2020-05-05 | End: 2020-05-05

## 2020-05-05 NOTE — PLAN OF CARE
Problem:+ COVID  Data:  pt is A&Ox4. O2 sats WNL 2L of oxygen. Room air is baseline. Encouraged IS and Proning. Need to collect sputum. On tele, NSR. NSR/ST. Elect protocol. Toleratying diet. QID accu checks. Voids to the bathroom. Up by self.    Interventi Plan goals for specific interventions   Outcome: Progressing

## 2020-05-05 NOTE — PLAN OF CARE
NURSING ADMISSION NOTE      Patient admitted via ER  Oriented to room. Safety precautions initiated. Bed in low position. Call light in reach.

## 2020-05-05 NOTE — PROGRESS NOTES
RE[facility] HOSPITALIST  Progress Note     Georgina Hassan Patient Status:  Observation    12/15/1960 MRN [id number]   [facility] 3SW-A Attending Nicol Bell MD   Hosp Day # 0 PCP Pradeep Pillai DO     Chief Complaint: sob    S: Patient up to 1. COVID19 PNA  2. Hypoxia  1. Up to 3L NC  2. Robitussin AC  3. CXR w/o focal consolidation, doubt bacterial PNA  4. Plaquenil, qtc 448  5. Markers upgranding  6. Give tocilizumab x 1 now  3. Hyponatremia  4. Hypokalemia  5. thrombocytopenia  6.  Hx UE

## 2020-05-06 PROBLEM — J96.00 ACUTE RESPIRATORY FAILURE DUE TO COVID-19 (HCC): Status: ACTIVE | Noted: 2020-05-06

## 2020-05-06 PROBLEM — U07.1 ACUTE RESPIRATORY FAILURE DUE TO COVID-19 (HCC): Status: ACTIVE | Noted: 2020-05-06

## 2020-05-06 PROCEDURE — 99225 SUBSEQUENT OBSERVATION CARE: CPT | Performed by: INTERNAL MEDICINE

## 2020-05-06 RX ORDER — FLUTICASONE PROPIONATE 50 MCG
1 SPRAY, SUSPENSION (ML) NASAL DAILY
Status: DISCONTINUED | OUTPATIENT
Start: 2020-05-06 | End: 2020-05-10

## 2020-05-06 RX ORDER — POTASSIUM CHLORIDE 20 MEQ/1
40 TABLET, EXTENDED RELEASE ORAL EVERY 4 HOURS
Status: COMPLETED | OUTPATIENT
Start: 2020-05-06 | End: 2020-05-06

## 2020-05-06 NOTE — PLAN OF CARE
Assumed care of patient at 7am  AOx4 in NAD  MILLER; lungs diminished; desats to 83% on 4 L via NC with activity; recovery time approx 1.5 min to 90%  Cough dry  SR on telemetry; denies chest pain  Abdomen soft; + BS  Voids; darker yellow urine  Up with STAND

## 2020-05-06 NOTE — PLAN OF CARE
Monitor blood glucose; QID and as needed for signs of hypoglycemia  Encourage proning; IS; coughing and deep breathing    Problem: Diabetes/Glucose Control  Goal: Glucose maintained within prescribed range  Description  INTERVENTIONS:  - Monitor Blood Gluc

## 2020-05-06 NOTE — PLAN OF CARE
Problem:+ COVID  Data:  pt is A&Ox4. O2 sats WNL 3L of oxygen. Room air is baseline. Encouraged IS and Proning. Need to collect sputum pt unable to cough anything up. On tele, NSR/ST. Elect protocol. Tolerating diet. QID accu checks. Voids to the bathroom. goals for specific interventions   5/5/2020 2211 by Prasanna Amaya RN  Outcome: Progressing  5/5/2020 2156 by Prasanna Amaya RN  Outcome: Progressing  Goal: Patient/Family Short Term Goal  Description  Patient's Short Term Goal:   [address] [place] [address] [facility] O

## 2020-05-06 NOTE — PROGRESS NOTES
05/06/20 6568   Provider Notification   Reason for Communication Critical value  (prelim absolute neutrophil 0.34)   Test/Procedure Name   ( )   Provider Name Other (comment)  Marileeyice Ann)   Method of Communication Page     MD Aware about pt's absolute neut

## 2020-05-06 NOTE — PROGRESS NOTES
REJI HOSPITALIST  Progress Note     Corbin Zhou Patient Status:  Observation    12/15/1960 MRN [id number]   [facility] 3SW-A Attending Norma Tse MD   Hosp Day # 0 PCP Mary Gutierrez DO     Chief Complaint: hypoxia   S: Patient on4 hours. Recent Labs   Lab 05/04/20  1151 05/04/20  1857   TROP <0.045 <0.045   *  --             Imaging: Imaging data reviewed in Epic.     Medications:   • Fluticasone Propionate  1 spray Each Nare Daily   • Desvenlafaxine Succinate ER  50 mg Oral

## 2020-05-07 PROBLEM — U07.1 COVID-19 VIRUS INFECTION: Status: ACTIVE | Noted: 2020-05-07

## 2020-05-07 PROCEDURE — 99225 SUBSEQUENT OBSERVATION CARE: CPT | Performed by: INTERNAL MEDICINE

## 2020-05-07 NOTE — PAYOR COMM NOTE
--------------  ADMISSION REVIEW     Payor: [employer]  Subscriber #:  [id number]  Authorization Number: [id number]     Admit date: 5/7/20  Admit time: 2750       Admitting Physician: Selina Pineda MD  Attending Physician:  Wendy Neri Physical Exam     ED Triage Vitals [05/04/20 1119]   /80   Pulse 96   Resp 22   Temp 100.2 °F (37.9 °C)   Temp src Oral   SpO2 93 %   O2 Device None (Room air)   Patient is low was 90% on room air with talking    Current:/66   Pulse 97 PCR Detected (*)     All other components within normal limits   CBC W/ DIFFERENTIAL - Abnormal; Notable for the following components:    WBC 2.3 (*)     PLT 81.0 (*)     Neutrophil Absolute Prelim 1.03 (*)     Neutrophil Absolute 1.03 (*)     Lymphocyte A Diana Roberson DO     Chief Complaint: sob    CXR: FINDINGS:  Low lung volumes. Magnified heart and normal pulmonary vascularity. Bibasilar atelectasis. No focal consolidation.   Postsurgical changes of lower cervical spine and degenerative spurring of th

## 2020-05-07 NOTE — PROGRESS NOTES
REJI HOSPITALIST  Progress Note     John Ordonez Patient Status:  Inpatient    12/15/1960 MRN [id number]   [facility] 3SW-A Attending Annie Savage MD   Hosp Day # 0 PCP Harjinder Mccormick DO     Chief Complaint: hypoxia    S: Patient with 120* 132*  --   --  147*   BILT 0.5 0.7  --   --  0.6   TP 7.3 7.2  --   --  6.9       Estimated Creatinine Clearance: 111.5 mL/min (based on SCr of 0.76 mg/dL). No results for input(s): PTP, INR in the last 168 hours.          COVID-19 Lab Results    [place] point Mr. Bong Posey is expected to be discharge to: home     Plan of care discussed with pt, RN     Antoni Bee MD

## 2020-05-07 NOTE — PAYOR COMM NOTE
--------------  CONTINUED STAY REVIEW   5-7-20       Payor: Keshawn Paresh [employer]  Subscriber #:  [id number]  Authorization Number: [id number]     Admit date: 5/7/20  Admit time: 9716    Admitting Physician: Luis Romeo MD  Attending Physician:  Shreya Santos U/L 417High       Physical Exam:    General: No acute distress. Respiratory: Clear to auscultation bilaterally. No wheezes. No rhonchi. Cardiovascular: S1, S2. Regular rate and rhythm. No murmurs, rubs or gallops.    Abdomen: Soft, nontender, nondistende Route User    5/7/2020 0817 Given 200 mg Oral Homero Rendon RN    5/6/2020 2114 Given 200 mg Oral Peace Nova, RN      Insulin Aspart Pen (NOVOLOG) 100 UNIT/ML flexpen 1-10 Units     Date Action Dose Route User    5/7/2020 0818 Given 1 Units Sub

## 2020-05-07 NOTE — PLAN OF CARE
Received patient alert and orientated. Oxygen WNL on4L NC, . NSR/ST per tele. Pt with no complaints of pain, medications given per MAR. IV Sl. Tolerating diet. Voiding per urinal. Pt instructed to call for help, call light within reach.  Pt updated on PO medicine  - See additional Care Plan goals for specific interventions     Outcome: Progressing

## 2020-05-07 NOTE — PLAN OF CARE
Pt alert & oriented. Up ad thiago. Remains on 4L NC. Requested cough med & reglan this am. No further complaints.  Self prones prn  Problem: Diabetes/Glucose Control  Goal: Glucose maintained within prescribed range  Description  INTERVENTIONS:  - Monitor Bloo

## 2020-05-08 PROCEDURE — 99225 SUBSEQUENT OBSERVATION CARE: CPT | Performed by: INTERNAL MEDICINE

## 2020-05-08 RX ORDER — ALPRAZOLAM 0.25 MG/1
0.25 TABLET ORAL 2 TIMES DAILY PRN
Status: DISCONTINUED | OUTPATIENT
Start: 2020-05-08 | End: 2020-05-10

## 2020-05-08 NOTE — PLAN OF CARE
Room air at rest 86%, room air ambulation 84%  O2 ambulation 90% on 5 L NC       Pt became fatigued during O2 walk, could not continue after 5 L NC

## 2020-05-08 NOTE — PLAN OF CARE
Assumed care at 1900. Alert and oriented x4. Telemetry monitor reading SR. Reglan given for nausea with relief. Encouraged proning and IS. Plan labs and wean O2. Call light in reach at bedside. Will continue to monitor.       Problem: Diabetes/Glucose Contr

## 2020-05-08 NOTE — PLAN OF CARE
Received patient at 0730. Alert and Oriented x4. Tele Rhythm NSR. Turned on QTc monitoring. O2 saturation 91% on 4 L NC, trying to wean. Breath sounds clear with fine crackles heard at bases. Bed is locked and in low position.  Call light and personal items Patient/Family Short Term Goal  Description  Patient's Short Term Goal:   5/4NOC Maintain O2 sats WNL  5/5NOC: maintain O2 WNL and control cough  5/6Noc: to wean O2    Interventions:   - administer O2 and prone  -PRN cough medicine  - See additional Care P

## 2020-05-08 NOTE — PROGRESS NOTES
REJI HOSPITALIST  Progress Note     Malina Jackson Patient Status:  Inpatient    12/15/1960 MRN [id number]   [facility] 3SW-A Attending Kaya Quiñones MD   Hosp Day # 1 PCP Arlin Villa DO     Chief Complaint cough    S: Patient still o --   --  9 9   CREATSERUM 0.82  --   --  0.76 0.72   GFRAA 112  --   --  115 118   GFRNAA 97  --   --  100 102   CA 8.2*  --   --  8.2* 8.2*   ALB 3.4  --   --  3.2* 3.4   *  --   --  136 139   K 3.7   < > 5.0 4.0 3.8   CL 97*  --   --  104 105   CO2 9.6  2. Levemir 10U nightly, ISS  3. DM education   4. Can likely send home on oral metformin and recheck HgA1c in 3 months once recovered from [place]  8. Hx UE DVT  9. ALTON  10.  Hx SBO     Quality:  · DVT Prophylaxis: lovenox  · CODE status: full  · Landry:

## 2020-05-09 PROCEDURE — 99225 SUBSEQUENT OBSERVATION CARE: CPT | Performed by: INTERNAL MEDICINE

## 2020-05-09 RX ORDER — LOPERAMIDE HYDROCHLORIDE 2 MG/1
2 CAPSULE ORAL 4 TIMES DAILY PRN
Status: DISCONTINUED | OUTPATIENT
Start: 2020-05-09 | End: 2020-05-10

## 2020-05-09 NOTE — PLAN OF CARE
Pt is AOx4, restless and anxious at times, PRN Ativan given. Attempted to wean to 2L this morning, but sats 87-89%. Placed on 3L and sats now >91%. Tele, NSR. VSS. Voids. Up independently. QID accuchecks. Patient updated with plan of care.  Will continue t prone  -PRN cough medicine  - See additional Care Plan goals for specific interventions      Outcome: Progressing

## 2020-05-09 NOTE — PLAN OF CARE
Assumed pt care at [address] [place] [address] for the night shift. Pt dangling at the bedside. A/O x 4. Report feeling anxious and requesting meds. Dr. Holcomb Patience notified. Xanax given as ordered w/ improvement. Pt remains on 3L O2 overnight w/ O2 sats > 92%.    Resp easy and non l Patient/Family Goals  Goal: Patient/Family Long Term Goal  Description  Patient's Long Term Goal: To be discharged    Interventions:  - follow MD orders  - See additional Care Plan goals for specific interventions   Outcome: Progressing  Goal: Patient/Fami

## 2020-05-09 NOTE — PROGRESS NOTES
REJI HOSPITALIST  Progress Note     John Ordonez Patient Status:  Inpatient    12/15/1960 MRN [id number]   [facility] 3SW-A Attending Annie Savage MD   Hosp Day # 2 PCP Harjinder Mccormick DO     Chief Complaint: hypoxia    S: Patient slee Clearance: 128.4 mL/min (A) (based on SCr of 0.66 mg/dL (L)). No results for input(s): PTP, INR in the last 168 hours.          COVID-19 Lab Results    COVID-19  Lab Results   Component Value Date    COVID19 Detected (AA) 05/04/2020    COVID19 Detected (

## 2020-05-10 VITALS
RESPIRATION RATE: 18 BRPM | HEIGHT: 71 IN | HEART RATE: 72 BPM | OXYGEN SATURATION: 90 % | DIASTOLIC BLOOD PRESSURE: 66 MMHG | BODY MASS INDEX: 32.06 KG/M2 | WEIGHT: 229 LBS | TEMPERATURE: 98 F | SYSTOLIC BLOOD PRESSURE: 123 MMHG

## 2020-05-10 PROCEDURE — 99217 OBSERVATION CARE DISCHARGE: CPT | Performed by: INTERNAL MEDICINE

## 2020-05-10 RX ORDER — GLIPIZIDE 10 MG/1
10 TABLET ORAL
Qty: 30 TABLET | Refills: 1 | Status: SHIPPED | OUTPATIENT
Start: 2020-05-10 | End: 2020-05-20 | Stop reason: DRUGHIGH

## 2020-05-10 RX ORDER — BLOOD-GLUCOSE METER
EACH MISCELLANEOUS
Qty: 1 KIT | Refills: 0 | Status: SHIPPED | OUTPATIENT
Start: 2020-05-10

## 2020-05-10 RX ORDER — BLOOD SUGAR DIAGNOSTIC
STRIP MISCELLANEOUS
Qty: 50 STRIP | Refills: 6 | Status: SHIPPED | OUTPATIENT
Start: 2020-05-10 | End: 2020-09-02

## 2020-05-10 NOTE — DISCHARGE SUMMARY
[facility]PITAL[facility]  DISCHARGE SUMMARY     Yenifer Nevarez Patient Status:  Inpatient    12/15/1960 MRN [id number]   [facility] 3SW-A Attending No att. providers found   Hosp Day # 3 PCP Kaycee Santo DO     Date of Admission: 2020  Date tablet  Refills:  1     metFORMIN HCl 500 MG Tabs  Commonly known as:  GLUCOPHAGE      Take 1 tablet (500 mg total) by mouth 2 (two) times daily with meals.    Stop taking on:  June 9, 2020  Quantity:  60 tablet  Refills:  2     OneTouch Delica Lancets Fine Virtual Telephone visit with your provider. Please be available at your phone so that your physician can contact you, and be prepared with any questions or concerns. You may be billed a copay at a later time, depending upon your insurance.  As always, your

## 2020-05-10 NOTE — CM/SW NOTE
10:18am  MSW spoke to patient, he has no hx of Home 02 and does want it. No preference of provider. MSW sent message to Rn to put o2 walk in a note.  Once all needed documentation is ready MSW will send referral. Pt advised 02 can take between 2-4 hours to

## 2020-05-10 NOTE — PROGRESS NOTES
NURSING DISCHARGE NOTE    Discharged Home via Wheelchair. Accompanied by Support staff  Belongings Taken by patient/family. Discharge instructions, new prescriptions, and follow up appointments reviewed with patient. PIV removed.  Diabetes education

## 2020-05-10 NOTE — PLAN OF CARE
Problem: Diabetes/Glucose Control  Goal: Glucose maintained within prescribed range  Description  INTERVENTIONS:  - Monitor Blood Glucose as ordered  - Assess for signs and symptoms of hyperglycemia and hypoglycemia  - Administer ordered medications to m O2, ON 3L SATING AT 91%, SR ON TELE, HAVING DIARRHEA BUT C DIFF WAS NEGATIVE, NO C/O PAIN, BLOOD SUGARS ARE STABLE    ACTION: MEDICATIONS GIVEN AS ORDERED, CONTINUE TO MONITOR ON TELE AND , XANAX GIVEN FOR ANXIETY, ENCOURAGE PATIENT TO USE INCENTIVE SPI

## 2020-05-10 NOTE — PROGRESS NOTES
SPO2 on Room Air at Rest: 89  SPO2 Ambulation on Room Air: 86   SPO2 Ambulation on Oxygen: 94  Ambulation oxygen flow (liters per minute): 5

## 2020-05-11 ENCOUNTER — PATIENT OUTREACH (OUTPATIENT)
Dept: CASE MANAGEMENT | Age: 60
End: 2020-05-11

## 2020-05-11 ENCOUNTER — TELEPHONE (OUTPATIENT)
Dept: FAMILY MEDICINE CLINIC | Facility: CLINIC | Age: 60
End: 2020-05-11

## 2020-05-11 DIAGNOSIS — U07.1 COVID-19 VIRUS INFECTION: ICD-10-CM

## 2020-05-11 DIAGNOSIS — Z02.9 ENCOUNTERS FOR ADMINISTRATIVE PURPOSE: ICD-10-CM

## 2020-05-11 DIAGNOSIS — R11.0 NAUSEA: Primary | ICD-10-CM

## 2020-05-11 PROCEDURE — 1111F DSCHRG MED/CURRENT MED MERGE: CPT

## 2020-05-11 RX ORDER — PROMETHAZINE HYDROCHLORIDE 12.5 MG/1
12.5 TABLET ORAL EVERY 6 HOURS PRN
Qty: 15 TABLET | Refills: 0 | Status: SHIPPED | OUTPATIENT
Start: 2020-05-11 | End: 2020-10-16 | Stop reason: ALTCHOICE

## 2020-05-11 NOTE — PROGRESS NOTES
1st attempt TCC apt request    [place]   [phone]. [address], [address]   ( parking available, M – F, 8 am – 6 pm)    Apt made Tues.  May 12th @11am

## 2020-05-11 NOTE — TELEPHONE ENCOUNTER
I sent some promethazine he can take as needed. He is also one home oxygen and a newly diagnosed diabetic, so we will need to follow up with him in the next 1-2 weeks. Please set him up for video visit follow-up, if possible. Thanks.

## 2020-05-11 NOTE — TELEPHONE ENCOUNTER
Patient notified and verbalized understanding.    Video visit scheduled    Steffany Almonte verbally consents to a Virtual/Telephone Check-In service on 5/20/2020  Patient understands and accepts financial responsibility for any deductible, co-insurance and/

## 2020-05-11 NOTE — TELEPHONE ENCOUNTER
Patient states he was diagnosed with Covid at [address]  He is home now. States cough is gone. No fever, wheezing or SOB. States he has occasional nausea and would like to know if KE can give something for that. States zofran makes him nauseous.

## 2020-05-11 NOTE — TELEPHONE ENCOUNTER
[facility] [address] [facility], [address] [facility] [address], [phone], [phone]    Pt is home from hospital now and is asking for medication for nausea.

## 2020-05-11 NOTE — PROGRESS NOTES
Initial Post Discharge Follow Up   Discharge Date: 5/10/20  Contact Date: 5/11/2020    Consent Verification:  Assessment Completed With: Patient  HIPAA Verified?   Yes    Discharge Dx:  Acute respiratory failure due to COVID-19    Was TCC ordered: yes Sulfate  (90 Base) MCG/ACT Inhalation Aero Soln Inhale 2 puffs into the lungs every 6 (six) hours as needed for Shortness of Breath. 1 Inhaler 3   • Desvenlafaxine Succinate ER 50 MG Oral Tablet 24 Hr Take 1 tablet (50 mg total) by mouth daily.  90 t in the state of [place] during the virtual visit. Please use the [employer] Bhupendra and launch the video visit 10 minutes prior to your scheduled appointment time to ensure your camera and microphone are working properly.  Once the video visit has s was directed to continue to isolate away from other household members when possible and stay completely isolated from the general public 3 days after symptoms resolve or 7 days total (whichever is longer).  NCM advised the patient is to continue to take tyl

## 2020-05-12 ENCOUNTER — TELEPHONE (OUTPATIENT)
Dept: ENDOCRINOLOGY CLINIC | Facility: CLINIC | Age: 60
End: 2020-05-12

## 2020-05-12 ENCOUNTER — VIRTUAL PHONE E/M (OUTPATIENT)
Dept: INTERNAL MEDICINE CLINIC | Facility: CLINIC | Age: 60
End: 2020-05-12
Payer: COMMERCIAL

## 2020-05-12 DIAGNOSIS — U07.1 COVID-19 VIRUS INFECTION: Primary | ICD-10-CM

## 2020-05-12 DIAGNOSIS — D69.6 THROMBOCYTOPENIA (HCC): ICD-10-CM

## 2020-05-12 DIAGNOSIS — R74.8 ELEVATED LIVER ENZYMES: ICD-10-CM

## 2020-05-12 DIAGNOSIS — Z99.89 OSA ON CPAP: ICD-10-CM

## 2020-05-12 DIAGNOSIS — G47.33 OSA ON CPAP: ICD-10-CM

## 2020-05-12 DIAGNOSIS — Z86.718 PERSONAL HISTORY OF DVT (DEEP VEIN THROMBOSIS): ICD-10-CM

## 2020-05-12 DIAGNOSIS — E11.9 NEW ONSET TYPE 2 DIABETES MELLITUS (HCC): ICD-10-CM

## 2020-05-12 PROCEDURE — 99495 TRANSJ CARE MGMT MOD F2F 14D: CPT | Performed by: CLINICAL NURSE SPECIALIST

## 2020-05-12 NOTE — PROGRESS NOTES
Virtual/Telephone Check-In    Boyce Sensor verbally consents to a Virtual/Telephone Check-In service on 05/12/20. Patient understands and accepts financial responsibility for any deductible, co-insurance and/or co-pays associated with this service. depending upon your insurance. As always, your health is our priority.        May 20, 2020 10:00 AM CDT Video Visit with Gary Arndt,  [employer], [place] ([address])    You must be in the state of

## 2020-05-12 NOTE — PROGRESS NOTES
TRANSITIONAL CARE CLINIC PHARMACIST MEDICATION RECONCILIATION        Dav Wilson MRN [id number]    12/15/1960 PCP Kat Valverde DO       Comments: Medication history completed by the [address] [place] [address] Pharmacist with the patient on the phone. repeat this process before dinner. Discussed the importance of  the medications more for better daily coverage and to minimize low blood sugars. Patient agreed to change the process. Denies any nausea with the medications or low blood sugars.

## 2020-05-14 ENCOUNTER — PATIENT OUTREACH (OUTPATIENT)
Dept: CASE MANAGEMENT | Age: 60
End: 2020-05-14

## 2020-05-14 NOTE — PAYOR COMM NOTE
--------------  DISCHARGE REVIEW    Payor: [employer] P[employer]  Subscriber #:  [id number]  Authorization Number: [id number]     Admit date: 5/7/20  Admit time:  0876  Discharge Date: 5/10/2020  2:25 PM     Admitting Physician: Chrystal Oppenheim, MD Atte will be dc home on oral meds and follow up as outpatient for DM and also home o2 eval. He was very stable and eager to get home.      Lace+ Score: 49  59-90 High Risk  29-58 Medium Risk  0-28   Low Risk  Patient was [facility] to the [facility] Phone:  [phone]   · glipiZIDE 10 MG Tabs  · metFORMIN HCl 500 MG Tabs  · OneTouch Delica Lancets Fine Misc  · OneTouch Verio Flex System w/Device Kit  · OneTouch Verio Strp         [place]: [place]    Follow-up appointment:   Cecy Earl DO

## 2020-05-18 NOTE — PROGRESS NOTES
Home Monitoring Condition Update    Covid19+ test date: 5/10/2020      Consent Verification:  Assessment Completed With: Patient  HIPAA Verified?   Yes    COVID-19 HOME MONITORING 5/18/2020   Temperature 97.4   Reading From Mouth   SPO2 93   Pulse 97   Pu home but is not using. Informed the patient we are monitoring all patients diagnosed with Covid-19 closely. One of our nurses on our team will be calling you daily over the next several days to receive a condition update.   We will ask you questions and

## 2020-05-19 ENCOUNTER — PATIENT OUTREACH (OUTPATIENT)
Dept: FAMILY MEDICINE CLINIC | Facility: CLINIC | Age: 60
End: 2020-05-19

## 2020-05-19 ENCOUNTER — PATIENT OUTREACH (OUTPATIENT)
Dept: CASE MANAGEMENT | Age: 60
End: 2020-05-19

## 2020-05-19 ENCOUNTER — TELEPHONE (OUTPATIENT)
Dept: FAMILY MEDICINE CLINIC | Facility: CLINIC | Age: 60
End: 2020-05-19

## 2020-05-19 NOTE — PROGRESS NOTES
Home Monitoring Condition Update    Covid19+ test date: 5/10/2020      Consent Verification:  Assessment Completed With: Patient  HIPAA Verified?   Yes    COVID-19 HOME MONITORING 5/19/2020   Temperature 97.3   Reading From Mouth   SPO2 94   Pulse 95   Pu 5/19/2020 was 174. He stated will discuss with his PCP at tomorrow's visit.        Patient advised to continue monitoring temperature and pulse at least twice a day and record, rest and stay hydrated--to call the PCP with worsening symptoms,  Patient enc

## 2020-05-19 NOTE — TELEPHONE ENCOUNTER
FYI may not be available for Video appointment is currently making  arrangements for father who just passed dur to covid 23 will update us

## 2020-05-19 NOTE — TELEPHONE ENCOUNTER
Has appt scheduled tomorrow at 10    Future Appointments   Date Time Provider Alok Darling   5/20/2020 10:00 AM [facility] [place]   5/21/2020  1:00 PM Lesley Hayden, RD,LDN,CDE [facility] [address]

## 2020-05-19 NOTE — PROGRESS NOTES
The patient stated has a Video visit on 5/20/20 with his PCP    Last night after dinner he developed the shakes/sweats and lightheadedness. He did not take his temperature. Today he feels fine.    He continues to have \"Laryngitis\" and fatigue and slig

## 2020-05-20 ENCOUNTER — TELEMEDICINE (OUTPATIENT)
Dept: FAMILY MEDICINE CLINIC | Facility: CLINIC | Age: 60
End: 2020-05-20

## 2020-05-20 VITALS — HEART RATE: 93 BPM | OXYGEN SATURATION: 95 % | TEMPERATURE: 98 F

## 2020-05-20 DIAGNOSIS — E11.65 TYPE 2 DIABETES MELLITUS WITH HYPERGLYCEMIA, WITHOUT LONG-TERM CURRENT USE OF INSULIN (HCC): Primary | ICD-10-CM

## 2020-05-20 DIAGNOSIS — Z20.822 SUSPECTED COVID-19 VIRUS INFECTION: ICD-10-CM

## 2020-05-20 PROCEDURE — 99214 OFFICE O/P EST MOD 30 MIN: CPT | Performed by: FAMILY MEDICINE

## 2020-05-20 RX ORDER — GLIPIZIDE 10 MG/1
10 TABLET, FILM COATED, EXTENDED RELEASE ORAL DAILY
Qty: 90 TABLET | Refills: 0 | Status: SHIPPED | OUTPATIENT
Start: 2020-05-20 | End: 2020-10-16

## 2020-05-20 NOTE — TELEPHONE ENCOUNTER
Thanks for the update. I spoke with him today. Keven Braun is feeling better today. He had not eaten all day until 4 pm when he ate lunch because he was planning his father's  (who  from [facility]). He will eat regular meals and monitor.  His breathing cont

## 2020-05-20 NOTE — PROGRESS NOTES
Noted. Will follow up tomorrow with patient. John Zaldivar, DO  Ee Home Monitoring 4 hours ago (10:26 AM)         Thanks for the update. I spoke with him today. Sarikaele Catracho is feeling better today.  He had not eaten all day until 4 pm when he ate lunch pippa

## 2020-05-20 NOTE — PROGRESS NOTES
This is a telemedicine visit with live, interactive video and audio. Patient understands and accepts financial responsibility for any deductible, co-insurance and/or co-pays associated with this service.     SUBJECTIVE  Follow up hospitalization for COV ANESTH,SHOULDER REPLACEMENT Left 12/20/18--Dr. Claudine Natarajan    total shoulder arthroplasty   • APPENDECTOMY      rupture   • CHOLECYSTECTOMY  10/2003   • ENTEROSCOPY N/A 4/23/2019    Performed by Vijay Iniguez MD at [facility] ENDOSCOPY   • ESOPHAGOGASTRODUODENOSCOPY generic Norco without any             reactions   Current Outpatient Medications   Medication Sig Dispense Refill   • metFORMIN HCl 500 MG Oral Tab Take 2 tablets (1,000 mg total) by mouth 2 (two) times daily with meals.  360 tablet 0   • glipiZIDE ER 10 MG

## 2020-05-21 ENCOUNTER — TELEMEDICINE (OUTPATIENT)
Dept: ENDOCRINOLOGY CLINIC | Facility: CLINIC | Age: 60
End: 2020-05-21

## 2020-05-21 ENCOUNTER — PATIENT OUTREACH (OUTPATIENT)
Dept: CASE MANAGEMENT | Age: 60
End: 2020-05-21

## 2020-05-21 DIAGNOSIS — E11.65 TYPE 2 DIABETES MELLITUS WITH HYPERGLYCEMIA, WITHOUT LONG-TERM CURRENT USE OF INSULIN (HCC): Primary | ICD-10-CM

## 2020-05-21 PROCEDURE — G0108 DIAB MANAGE TRN  PER INDIV: HCPCS | Performed by: DIETITIAN, REGISTERED

## 2020-05-21 NOTE — PROGRESS NOTES
Home Monitoring Condition Update    Covid19+ test date: *5/10/2020      Consent Verification:  Assessment Completed With: Patient  HIPAA Verified?   Yes    COVID-19 HOME MONITORING 5/21/2020   Temperature 97.8   Reading From Mouth   SPO2 95   Pulse 90   P Problem: Goal Outcome Summary  Goal: Goal Outcome Summary  1. Pt s pain will be well controlled   PT 6C: Pt continues to be motivated to participate in therapy session. Completes bed mobility with Min-ModA of 1 and cues for sternal precautions. Sit<>stands with Soledad of 1. Progressed ambulation today to 70 ft x1 and 350 ft x1 with fww and CGA. Improving with control of R LE with cues during ambulation. Limited d/t R sided weakness and fatigue. VSS on RA - SpO2 96% and HR 94 bpm with activity.      Recommend discharge to ARU once medically appropriate.        clots--patient verbalizes understanding and agreement. Patient verbalized understanding that a nurse will be contacting him/her tomorrow.     Patient advised to inform their Employee Health department or Manager when they have tested positive for COVID-19

## 2020-05-21 NOTE — PROGRESS NOTES
Lindsey Veliz   12/15/1960 attended Step 1 Diabetic Education:     5/21/2020  Referring Provider: Dr Dedra Hernandez time: 1:00 End time: 2:00    Due to COVID-19 ACTION PLAN, the patient's office visit was conducted via video.      The patient verbally conse 1,000 mg BID and glipizide 10 mg BID to be weaned off. Monitoring: Reinforced blood glucose monitoring, testing schedules and target goals:   Fasting / Pre-meal  2 Hour Post-prandial 140-180  [place] how to test in hospital. FBS's 146, 142.  Hasn't

## 2020-05-22 ENCOUNTER — PATIENT OUTREACH (OUTPATIENT)
Dept: CASE MANAGEMENT | Age: 60
End: 2020-05-22

## 2020-05-22 NOTE — PROGRESS NOTES
Home Monitoring Condition Update    Covid19+ test date: *5/10/20  Contact date: 5/22/20      Consent Verification:  Assessment Completed With: Patient  HIPAA Verified?   Yes    COVID-19 HOME MONITORING 5/22/2020   Temperature 96.6   Reading From Mouth   S condition update. Patient advised to inform their Employee Health department or Manager when they have tested positive for COVID-19.     The patient was also directed to continue to isolate away from other household members when possible and stay completel

## 2020-05-26 ENCOUNTER — PATIENT OUTREACH (OUTPATIENT)
Dept: CASE MANAGEMENT | Age: 60
End: 2020-05-26

## 2020-05-26 ENCOUNTER — TELEPHONE (OUTPATIENT)
Dept: LAB | Age: 60
End: 2020-05-26

## 2020-05-26 NOTE — TELEPHONE ENCOUNTER
The patient was called and informed and very happy. He will still stay safe and wear a mask if going out.

## 2020-05-26 NOTE — TELEPHONE ENCOUNTER
Please advise:    he patient stated feels \"back to normal\"     He has a dry  coughs once or twice a day. He is asking if he can come off of isolation? Can we dismiss him from Home Monitoring.    I offered an appointment and he did not think one is nee

## 2020-05-26 NOTE — TELEPHONE ENCOUNTER
Yes, he can come off of isolation. He is significantly better and has been home for over 2 weeks. Can also discharge from home monitoring. He will reach out to me if feeling worse again. Thanks.

## 2020-05-26 NOTE — PROGRESS NOTES
Home Monitoring Condition Update    Covid19+ test date:5/10/2020      Consent Verification:  Assessment Completed With: Patient  HIPAA Verified?   Yes    COVID-19 HOME MONITORING 5/26/2020   Temperature 95.5   Reading From Mouth   SPO2 95   Pulse 100   Pu the general public 3 days after symptoms resolve or 10 days total (whichever is longer). Advised patient If symptoms worsen/ medical emergency to call 911.       Time spent this encounter reviewing chart, speaking with patient, gathering resources  Total T

## 2020-05-29 ENCOUNTER — TELEPHONE (OUTPATIENT)
Dept: FAMILY MEDICINE CLINIC | Facility: CLINIC | Age: 60
End: 2020-05-29

## 2020-05-29 NOTE — TELEPHONE ENCOUNTER
Per KE, not all lots are affected. Patient will need to contact pharmacy to see if received affected lot    Patient notified and verbalized understanding.

## 2020-05-29 NOTE — TELEPHONE ENCOUNTER
Pt would like to talk to nurse. He Saw in the news that his metFORMIN HCl 500 MG Oral Tab  Is now being linked back to cancer. He would like to know KE's take on this .    Please return call to [phone]

## 2020-05-30 DIAGNOSIS — F32.1 CURRENT MODERATE EPISODE OF MAJOR DEPRESSIVE DISORDER WITHOUT PRIOR EPISODE (HCC): ICD-10-CM

## 2020-05-30 RX ORDER — DESVENLAFAXINE 50 MG/1
TABLET, EXTENDED RELEASE ORAL
Qty: 90 TABLET | Refills: 0 | Status: SHIPPED | OUTPATIENT
Start: 2020-05-30 | End: 2020-09-03

## 2020-06-04 ENCOUNTER — TELEMEDICINE (OUTPATIENT)
Dept: ENDOCRINOLOGY CLINIC | Facility: CLINIC | Age: 60
End: 2020-06-04

## 2020-06-04 DIAGNOSIS — E11.65 TYPE 2 DIABETES MELLITUS WITH HYPERGLYCEMIA, WITHOUT LONG-TERM CURRENT USE OF INSULIN (HCC): Primary | ICD-10-CM

## 2020-06-04 PROCEDURE — G0108 DIAB MANAGE TRN  PER INDIV: HCPCS | Performed by: DIETITIAN, REGISTERED

## 2020-06-04 NOTE — PROGRESS NOTES
Diabetes Education Follow-up Note    Referring Provider: Dr Juan Pablo Melchor   Start time: 2:00  End time: 2:30    Due to COVID-19 ACTION PLAN, the patient's office visit was conducted via video.      The patient verbally consents to an audio-video consultation today

## 2020-07-02 ENCOUNTER — TELEMEDICINE (OUTPATIENT)
Dept: ENDOCRINOLOGY CLINIC | Facility: CLINIC | Age: 60
End: 2020-07-02

## 2020-07-02 ENCOUNTER — TELEPHONE (OUTPATIENT)
Dept: FAMILY MEDICINE CLINIC | Facility: CLINIC | Age: 60
End: 2020-07-02

## 2020-07-02 DIAGNOSIS — E11.65 TYPE 2 DIABETES MELLITUS WITH HYPERGLYCEMIA, WITHOUT LONG-TERM CURRENT USE OF INSULIN (HCC): Primary | ICD-10-CM

## 2020-07-02 DIAGNOSIS — E11.9 NEW ONSET TYPE 2 DIABETES MELLITUS (HCC): Primary | ICD-10-CM

## 2020-07-02 PROCEDURE — G0108 DIAB MANAGE TRN  PER INDIV: HCPCS | Performed by: DIETITIAN, REGISTERED

## 2020-07-02 NOTE — TELEPHONE ENCOUNTER
Pt called he just spoke with the diabetic dr and she reccommended that he change glipiZIDE ER 10 MG Oral Tablet 24 Hr. As his numbers are all over the place. He states they are anywhere from 235-50 in a day.

## 2020-07-02 NOTE — TELEPHONE ENCOUNTER
There are several other options. If we want to stick with a pill, jardiance might be a good idea. That will not drop him so low. It makes you pee sugars, so you would be prone to UTI's, so we should watch for that, otherwise, it is a good medication.

## 2020-07-02 NOTE — PROGRESS NOTES
Diabetes Education Follow-up Note    Referring Provider: Dr Armen Dowling   Start time: 2:00  End time: 2:30    Due to COVID-19 ACTION PLAN, the patient's office visit was conducted via video.      The patient verbally consents to an audio-video consultation today

## 2020-07-02 NOTE — TELEPHONE ENCOUNTER
Script sent for [employer]. He should stop the glipizide for now, we can always restart if needed.

## 2020-07-02 NOTE — TELEPHONE ENCOUNTER
Pt agreed with starting Jardiance. Please send in a new rx to pharmacy. Forward to Dr. Davie Bettencourt.

## 2020-07-11 ENCOUNTER — NURSE ONLY (OUTPATIENT)
Dept: ENDOCRINOLOGY CLINIC | Facility: CLINIC | Age: 60
End: 2020-07-11
Payer: COMMERCIAL

## 2020-07-11 DIAGNOSIS — E11.65 TYPE 2 DIABETES MELLITUS WITH HYPERGLYCEMIA, WITHOUT LONG-TERM CURRENT USE OF INSULIN (HCC): Primary | ICD-10-CM

## 2020-07-11 PROCEDURE — G0109 DIAB MANAGE TRN IND/GROUP: HCPCS | Performed by: DIETITIAN, REGISTERED

## 2020-07-11 NOTE — PROGRESS NOTES
Guadalupe Hardin  [id number] attended Step 2 Class: Pathophysiology of Diabetes, Types of Diabetes, Sources of Carbohydrate, Exercise, Blood Glucose Targets     Date: 7/11/2020  Referring Provider: Dr Butron Guido time: 9:00 End time: 11:00    The patie

## 2020-07-25 ENCOUNTER — TELEPHONE (OUTPATIENT)
Dept: ENDOCRINOLOGY CLINIC | Facility: CLINIC | Age: 60
End: 2020-07-25

## 2020-07-25 NOTE — TELEPHONE ENCOUNTER
NS for Diabetes Education Step 3 class today. LMTCB and resched Step 3 for either Thur 8/13 from 2-4pm here in [address] or on Sat 8/29 from 9-11am in [facility] at [address].

## 2020-07-30 ENCOUNTER — TELEPHONE (OUTPATIENT)
Dept: FAMILY MEDICINE CLINIC | Facility: CLINIC | Age: 60
End: 2020-07-30

## 2020-07-30 NOTE — TELEPHONE ENCOUNTER
Patient states over the weekend he was doing work at his daughters house. Was scrapping and chiseling. Sunday night finger was hurting like it was broken. States better now. Can actually bend the knuckle but still very painful. Can barely hold a pen.

## 2020-07-30 NOTE — TELEPHONE ENCOUNTER
Buddy tape the finger. Ice as needed. Continue acetaminophen up to 1000 mg TID as needed. I wouldn't do more than than for a finger. If the pain is getting worse, I want to see it in office.

## 2020-09-02 RX ORDER — BLOOD SUGAR DIAGNOSTIC
1 STRIP MISCELLANEOUS 3 TIMES DAILY
Qty: 300 STRIP | Refills: 0 | Status: SHIPPED | OUTPATIENT
Start: 2020-09-02

## 2020-09-02 NOTE — TELEPHONE ENCOUNTER
LOV: 05/20/20 Telemed  Last Refill:5/10/20 #50 6 RF    No future appointments. Last A1c value was 9.6% done 5/4/2020.

## 2020-09-02 NOTE — TELEPHONE ENCOUNTER
Glucose Blood (ONETOUCH VERIO) In Vitro Strip [[id number]] (Order   [facility]  Pharmacy say he needs a new script does he also need an appointment.   Please  call

## 2020-09-03 DIAGNOSIS — F32.1 CURRENT MODERATE EPISODE OF MAJOR DEPRESSIVE DISORDER WITHOUT PRIOR EPISODE (HCC): ICD-10-CM

## 2020-09-03 RX ORDER — DESVENLAFAXINE 50 MG/1
TABLET, EXTENDED RELEASE ORAL
Qty: 90 TABLET | Refills: 0 | Status: SHIPPED | OUTPATIENT
Start: 2020-09-03 | End: 2020-11-30

## 2020-09-03 NOTE — TELEPHONE ENCOUNTER
Protocol: none  Last refilled 5/30/20 #90 with 0 RF  Telemed visit with KE 5/20/20  No future appt  Routed to PCP to advise

## 2020-09-24 DIAGNOSIS — E11.65 TYPE 2 DIABETES MELLITUS WITH HYPERGLYCEMIA, WITHOUT LONG-TERM CURRENT USE OF INSULIN (HCC): ICD-10-CM

## 2020-09-24 NOTE — TELEPHONE ENCOUNTER
Pt failed refill protocol for the following reasons:    Diabetic Medication Protocol [id number]/24/2020 09:19 AM   Last HgBA1C < 7.5 Protocol Details    Microalbumin procedure in past 12 months or taking ACE/ARB            Last refill: 5- #360 with 0

## 2020-10-03 DIAGNOSIS — E11.9 NEW ONSET TYPE 2 DIABETES MELLITUS (HCC): ICD-10-CM

## 2020-10-03 RX ORDER — EMPAGLIFLOZIN 10 MG/1
TABLET, FILM COATED ORAL
Qty: 30 TABLET | Refills: 2 | Status: SHIPPED | OUTPATIENT
Start: 2020-10-03 | End: 2021-01-04

## 2020-10-03 NOTE — TELEPHONE ENCOUNTER
Pt failed refill protocol for the following reasons:  Diabetic Medication Protocol Ylqjxa25/03/2020 03:31 AM   Last HgBA1C < 7.5 Protocol Details    Microalbumin procedure in past 12 months or taking ACE/ARB            Last refill: 7/02/2020 #30 with 2 ref

## 2020-10-13 ENCOUNTER — TELEPHONE (OUTPATIENT)
Dept: FAMILY MEDICINE CLINIC | Facility: CLINIC | Age: 60
End: 2020-10-13

## 2020-10-13 NOTE — TELEPHONE ENCOUNTER
Per fax from Dr Conrado Briscoe office patient needs appt for preop physical and clearance.     Patient notified via detailed voicemail left at cell number (ok per  HIPAA consent)  Asked patient to call office back to schedule    Letter in blue book

## 2020-10-13 NOTE — TELEPHONE ENCOUNTER
Future Appointments   Date Time Provider Alok Darling   10/16/2020 10:30 AM Robby Juan DO [facility] CYNDEE [place]

## 2020-10-13 NOTE — TELEPHONE ENCOUNTER
Pt called he is having knee surgery on 11/2 with Dr. Elita Fothergill. He states he thinks he only needs to come in for an EKG. He is going to get his lab work and covid test done at [facility].

## 2020-10-16 ENCOUNTER — LABORATORY ENCOUNTER (OUTPATIENT)
Dept: LAB | Age: 60
End: 2020-10-16
Attending: FAMILY MEDICINE
Payer: COMMERCIAL

## 2020-10-16 ENCOUNTER — OFFICE VISIT (OUTPATIENT)
Dept: FAMILY MEDICINE CLINIC | Facility: CLINIC | Age: 60
End: 2020-10-16
Payer: COMMERCIAL

## 2020-10-16 VITALS
HEIGHT: 71 IN | DIASTOLIC BLOOD PRESSURE: 78 MMHG | SYSTOLIC BLOOD PRESSURE: 116 MMHG | RESPIRATION RATE: 16 BRPM | TEMPERATURE: 97 F | WEIGHT: 229 LBS | HEART RATE: 76 BPM | BODY MASS INDEX: 32.06 KG/M2

## 2020-10-16 DIAGNOSIS — R74.8 ELEVATED LIVER ENZYMES: ICD-10-CM

## 2020-10-16 DIAGNOSIS — E11.9 NEW ONSET TYPE 2 DIABETES MELLITUS (HCC): ICD-10-CM

## 2020-10-16 DIAGNOSIS — D69.6 THROMBOCYTOPENIA (HCC): ICD-10-CM

## 2020-10-16 DIAGNOSIS — T84.038A LOOSENING OF KNEE JOINT PROSTHESIS, INITIAL ENCOUNTER (HCC): ICD-10-CM

## 2020-10-16 DIAGNOSIS — G60.9 IDIOPATHIC PERIPHERAL NEUROPATHY: ICD-10-CM

## 2020-10-16 DIAGNOSIS — Z86.718 PERSONAL HISTORY OF DVT (DEEP VEIN THROMBOSIS): ICD-10-CM

## 2020-10-16 DIAGNOSIS — Z96.659 LOOSENING OF KNEE JOINT PROSTHESIS, INITIAL ENCOUNTER (HCC): ICD-10-CM

## 2020-10-16 DIAGNOSIS — M17.11 PRIMARY OSTEOARTHRITIS OF RIGHT KNEE: ICD-10-CM

## 2020-10-16 DIAGNOSIS — Z01.818 PRE-OP TESTING: Primary | ICD-10-CM

## 2020-10-16 DIAGNOSIS — G47.33 OSA ON CPAP: ICD-10-CM

## 2020-10-16 DIAGNOSIS — Z99.89 OSA ON CPAP: ICD-10-CM

## 2020-10-16 DIAGNOSIS — F32.1 CURRENT MODERATE EPISODE OF MAJOR DEPRESSIVE DISORDER WITHOUT PRIOR EPISODE (HCC): ICD-10-CM

## 2020-10-16 PROBLEM — R73.9 HYPERGLYCEMIA: Status: RESOLVED | Noted: 2020-05-04 | Resolved: 2020-10-16

## 2020-10-16 PROBLEM — R09.02 HYPOXIA: Status: RESOLVED | Noted: 2020-05-04 | Resolved: 2020-10-16

## 2020-10-16 PROBLEM — T84.098A: Status: RESOLVED | Noted: 2019-10-03 | Resolved: 2020-10-16

## 2020-10-16 PROBLEM — T18.2XXA FOREIGN BODY IN STOMACH, INITIAL ENCOUNTER: Status: RESOLVED | Noted: 2019-04-23 | Resolved: 2020-10-16

## 2020-10-16 PROBLEM — T84.50XA PROSTHETIC JOINT INFECTION, INITIAL ENCOUNTER (HCC): Status: RESOLVED | Noted: 2019-10-22 | Resolved: 2020-10-16

## 2020-10-16 PROBLEM — Z96.619: Status: RESOLVED | Noted: 2019-10-03 | Resolved: 2020-10-16

## 2020-10-16 PROBLEM — U07.1 ACUTE RESPIRATORY FAILURE DUE TO COVID-19 (HCC): Status: RESOLVED | Noted: 2020-05-06 | Resolved: 2020-10-16

## 2020-10-16 PROBLEM — H25.13 AGE-RELATED NUCLEAR CATARACT OF BOTH EYES: Status: ACTIVE | Noted: 2020-09-14

## 2020-10-16 PROBLEM — E87.6 HYPOKALEMIA: Status: RESOLVED | Noted: 2020-05-04 | Resolved: 2020-10-16

## 2020-10-16 PROBLEM — T84.50XA: Status: RESOLVED | Noted: 2019-10-22 | Resolved: 2020-10-16

## 2020-10-16 PROBLEM — T18.9XXA SWALLOWED FOREIGN BODY, INITIAL ENCOUNTER: Status: RESOLVED | Noted: 2019-04-24 | Resolved: 2020-10-16

## 2020-10-16 PROBLEM — Z20.822 SUSPECTED COVID-19 VIRUS INFECTION: Status: RESOLVED | Noted: 2020-05-04 | Resolved: 2020-10-16

## 2020-10-16 PROBLEM — M19.012 ARTHRITIS OF LEFT GLENOHUMERAL JOINT: Status: RESOLVED | Noted: 2018-11-13 | Resolved: 2020-10-16

## 2020-10-16 PROBLEM — T84.098A OTHER MECHANICAL COMPLICATION OF INTERNAL SHOULDER JOINT PROSTHESIS (HCC): Status: RESOLVED | Noted: 2019-10-03 | Resolved: 2020-10-16

## 2020-10-16 PROBLEM — Z96.619 OTHER MECHANICAL COMPLICATION OF INTERNAL SHOULDER JOINT PROSTHESIS (HCC): Status: RESOLVED | Noted: 2019-10-03 | Resolved: 2020-10-16

## 2020-10-16 PROBLEM — E87.1 HYPONATREMIA: Status: RESOLVED | Noted: 2020-05-04 | Resolved: 2020-10-16

## 2020-10-16 PROBLEM — U07.1 COVID-19 VIRUS INFECTION: Status: RESOLVED | Noted: 2020-05-07 | Resolved: 2020-10-16

## 2020-10-16 PROBLEM — J96.00 ACUTE RESPIRATORY FAILURE DUE TO COVID-19 (HCC): Status: RESOLVED | Noted: 2020-05-06 | Resolved: 2020-10-16

## 2020-10-16 PROCEDURE — 3008F BODY MASS INDEX DOCD: CPT | Performed by: FAMILY MEDICINE

## 2020-10-16 PROCEDURE — 36415 COLL VENOUS BLD VENIPUNCTURE: CPT | Performed by: FAMILY MEDICINE

## 2020-10-16 PROCEDURE — 99214 OFFICE O/P EST MOD 30 MIN: CPT | Performed by: FAMILY MEDICINE

## 2020-10-16 PROCEDURE — 3078F DIAST BP <80 MM HG: CPT | Performed by: FAMILY MEDICINE

## 2020-10-16 PROCEDURE — 83036 HEMOGLOBIN GLYCOSYLATED A1C: CPT | Performed by: FAMILY MEDICINE

## 2020-10-16 PROCEDURE — 85025 COMPLETE CBC W/AUTO DIFF WBC: CPT | Performed by: FAMILY MEDICINE

## 2020-10-16 PROCEDURE — 93000 ELECTROCARDIOGRAM COMPLETE: CPT | Performed by: FAMILY MEDICINE

## 2020-10-16 PROCEDURE — 3074F SYST BP LT 130 MM HG: CPT | Performed by: FAMILY MEDICINE

## 2020-10-16 PROCEDURE — 80053 COMPREHEN METABOLIC PANEL: CPT | Performed by: FAMILY MEDICINE

## 2020-10-16 NOTE — H&P
Catalina Salgado is a 61year old male who presents for a pre-operative physical exam. Patient is to have right total knee arthroplasty revision, to be done by Dr. Lucien Brunner at [facility] on 11/2/2020.       HPI:   Pt complains of difficulty Diagnosis Date   • Acute bronchitis    • Acute respiratory failure due to COVID-19 [facility]) 5/6/2020   • Anesthesia complication     agitated upon waking with  cervical fusion   • Back problem     cervical surgery   • Bowel obstruction [facility]) March 2015   • 3/2011    cervical vertebral fusion   • TOTAL KNEE REPLACEMENT Right    • VASECTOMY  1999      Family History   Problem Relation Age of Onset   • Cancer Mother         brain   • Cancer Paternal Grandfather         brain   • Psychiatric Son         Suicide without murmur  GI: good BS's,no masses, HSM or tenderness  : deferred  RECTAL: not done   MUSCULOSKELETAL: back is not tender,FROM of the back, right knee with surgical changes and swelling.    EXTREMITIES: no cyanosis, clubbing or edema  NEURO: Oriented Refills for this Visit:  Requested Prescriptions      No prescriptions requested or ordered in this encounter       Imaging & Consults:  ELECTROCARDIOGRAM, COMPLETE    This consult was sent back the referring physician, Dr. Shira Morejon.

## 2020-11-30 DIAGNOSIS — F32.1 CURRENT MODERATE EPISODE OF MAJOR DEPRESSIVE DISORDER WITHOUT PRIOR EPISODE (HCC): ICD-10-CM

## 2020-11-30 RX ORDER — DESVENLAFAXINE 50 MG/1
TABLET, EXTENDED RELEASE ORAL
Qty: 90 TABLET | Refills: 0 | Status: SHIPPED | OUTPATIENT
Start: 2020-11-30 | End: 2021-03-01

## 2020-11-30 NOTE — TELEPHONE ENCOUNTER
Routing to provider per protocol. Last refilled on 9/3/20 for # 90 with 0 rf. Last seen on 10/16/20. No future appointments. Thank you.

## 2020-12-11 DIAGNOSIS — E11.65 TYPE 2 DIABETES MELLITUS WITH HYPERGLYCEMIA, WITHOUT LONG-TERM CURRENT USE OF INSULIN (HCC): ICD-10-CM

## 2020-12-11 NOTE — TELEPHONE ENCOUNTER
Pt called, needs refill on METFORMIN  MG Oral Tab. Pharmacy-[address]   Please call pt at [phone]

## 2020-12-11 NOTE — TELEPHONE ENCOUNTER
Last OV 10/16/2020  Last lab10/16/2020 A1c 6  Last refilled 9/24/2020  #360  0 refills    Can not fill per protocol as patient due for micoralbumin

## 2021-01-04 DIAGNOSIS — E11.9 NEW ONSET TYPE 2 DIABETES MELLITUS (HCC): ICD-10-CM

## 2021-01-04 NOTE — TELEPHONE ENCOUNTER
LOV 10/16/20     Labs  10/16/20  hgb a1c= 6. 0. Test   No microalbumin found in last year. LR 10/3/20 #30 2 RF     No future appointments.

## 2021-01-05 RX ORDER — EMPAGLIFLOZIN 10 MG/1
1 TABLET, FILM COATED ORAL DAILY
Qty: 30 TABLET | Refills: 2 | Status: SHIPPED | OUTPATIENT
Start: 2021-01-05 | End: 2021-04-30

## 2021-01-26 ENCOUNTER — TELEPHONE (OUTPATIENT)
Dept: FAMILY MEDICINE CLINIC | Facility: CLINIC | Age: 61
End: 2021-01-26

## 2021-01-26 NOTE — TELEPHONE ENCOUNTER
MAYO for patient to schedule a sleep follow up appointment either in person or by video visit in order to renew his CPAP prescription, 1/26.

## 2021-01-29 ENCOUNTER — OFFICE VISIT (OUTPATIENT)
Dept: FAMILY MEDICINE CLINIC | Facility: CLINIC | Age: 61
End: 2021-01-29
Payer: COMMERCIAL

## 2021-01-29 VITALS
OXYGEN SATURATION: 97 % | HEART RATE: 88 BPM | HEIGHT: 71 IN | TEMPERATURE: 98 F | DIASTOLIC BLOOD PRESSURE: 80 MMHG | BODY MASS INDEX: 30.8 KG/M2 | RESPIRATION RATE: 16 BRPM | SYSTOLIC BLOOD PRESSURE: 130 MMHG | WEIGHT: 220 LBS

## 2021-01-29 DIAGNOSIS — H65.92 LEFT NON-SUPPURATIVE OTITIS MEDIA: Primary | ICD-10-CM

## 2021-01-29 PROCEDURE — 3008F BODY MASS INDEX DOCD: CPT | Performed by: FAMILY MEDICINE

## 2021-01-29 PROCEDURE — 99214 OFFICE O/P EST MOD 30 MIN: CPT | Performed by: FAMILY MEDICINE

## 2021-01-29 PROCEDURE — 3075F SYST BP GE 130 - 139MM HG: CPT | Performed by: FAMILY MEDICINE

## 2021-01-29 PROCEDURE — 3079F DIAST BP 80-89 MM HG: CPT | Performed by: FAMILY MEDICINE

## 2021-01-29 RX ORDER — AMOXICILLIN AND CLAVULANATE POTASSIUM 875; 125 MG/1; MG/1
1 TABLET, FILM COATED ORAL 2 TIMES DAILY
Qty: 20 TABLET | Refills: 0 | Status: SHIPPED | OUTPATIENT
Start: 2021-01-29 | End: 2021-02-08

## 2021-01-29 NOTE — PROGRESS NOTES
Chantel Phillip is a 61year old male. Patient presents with:  Ear Problem: ear infection on left side      HPI:   Left ear started hurting 2 days ago. No drainage. Feels is more when sitting up, pressure and bubbling, popping.  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 treat as above. RTC if worsening or not getting better. No orders of the defined types were placed in this encounter.               Meds & Refills for this Visit:  Requested Prescriptions     Signed Prescripti

## 2021-02-23 ENCOUNTER — OFFICE VISIT (OUTPATIENT)
Dept: FAMILY MEDICINE CLINIC | Facility: CLINIC | Age: 61
End: 2021-02-23
Payer: COMMERCIAL

## 2021-02-23 VITALS
WEIGHT: 218.63 LBS | RESPIRATION RATE: 20 BRPM | BODY MASS INDEX: 30.61 KG/M2 | HEIGHT: 71 IN | DIASTOLIC BLOOD PRESSURE: 70 MMHG | OXYGEN SATURATION: 99 % | HEART RATE: 71 BPM | TEMPERATURE: 97 F | SYSTOLIC BLOOD PRESSURE: 128 MMHG

## 2021-02-23 DIAGNOSIS — G47.33 OSA (OBSTRUCTIVE SLEEP APNEA): Primary | ICD-10-CM

## 2021-02-23 PROCEDURE — 99214 OFFICE O/P EST MOD 30 MIN: CPT | Performed by: FAMILY MEDICINE

## 2021-02-23 PROCEDURE — 3008F BODY MASS INDEX DOCD: CPT | Performed by: FAMILY MEDICINE

## 2021-02-23 PROCEDURE — 3074F SYST BP LT 130 MM HG: CPT | Performed by: FAMILY MEDICINE

## 2021-02-23 PROCEDURE — 96127 BRIEF EMOTIONAL/BEHAV ASSMT: CPT | Performed by: FAMILY MEDICINE

## 2021-02-23 PROCEDURE — 3078F DIAST BP <80 MM HG: CPT | Performed by: FAMILY MEDICINE

## 2021-02-23 NOTE — PROGRESS NOTES
[facility]        HPI:   This is a 61year old male coming in for Patient presents with: Follow - Up: sleep      HPI:  He is having some difficulties with sleep. He has some trouble getting asleep.   He thinks it is cervical fusion   • Back problem     cervical surgery   • Bowel obstruction [facility]) March 2015   • Calculus of kidney    • Cellulitis    • Conjunctivitis    • COVID-19 virus infection 5/7/2020   • Deep vein thrombosis ([facility] [address] 2011    left arm, after bowel obst Not on file    Social History    Tobacco Use      Smoking status: Never Smoker      Smokeless tobacco: Never Used    Alcohol use: Not Currently      Alcohol/week: 0.0 standard drinks      Comment: 10 drinks per year    Drug use: No    Family History:  Fam Instability of MCL     Gastritis     Rheumatoid factor positive     Primary osteoarthritis involving multiple joints     Primary osteoarthritis of right knee     Tremor     Osteoarthritis of glenohumeral joint, left     History of cholecystectomy     Perso Oropharynx is clear and moist.   Eyes: Pupils are equal, round, and reactive to light. Conjunctivae and EOM are normal.   Neck: Normal range of motion. Neck supple. No tracheal deviation present. No thyromegaly present.    MAL 4  Tonsils 0    Cardiovascular Advised patient to change filters,masks,hoses  and tubes and equiptment on a  regular schedule.   Filters and seals shall be changed every 1 month,  Hoses every 3 months,   CPAP mask and humidifier  chamber changed every 6 month  with the [employer]

## 2021-03-01 DIAGNOSIS — F32.1 CURRENT MODERATE EPISODE OF MAJOR DEPRESSIVE DISORDER WITHOUT PRIOR EPISODE (HCC): ICD-10-CM

## 2021-03-01 RX ORDER — DESVENLAFAXINE 50 MG/1
TABLET, EXTENDED RELEASE ORAL
Qty: 90 TABLET | Refills: 0 | Status: SHIPPED | OUTPATIENT
Start: 2021-03-01 | End: 2021-05-27

## 2021-03-01 NOTE — TELEPHONE ENCOUNTER
Routing to provider per protocol. Last refilled on 11/30/20 for # 90 with 0 rf. Last seen on 1/29/21. Future Appointments   Date Time Provider Alok Darling   5/24/2021  4:00 PM George Tavarez MD [facility] [place] [facility] [place]        Thank you.

## 2021-03-15 ENCOUNTER — TELEPHONE (OUTPATIENT)
Dept: FAMILY MEDICINE CLINIC | Facility: CLINIC | Age: 61
End: 2021-03-15

## 2021-03-15 NOTE — TELEPHONE ENCOUNTER
Patient states he takes Jardiance and Metformin at the same time. Is noticing lip swelling after taking. Started a week ago, happened one time and he didn't think much about it. It happened again the last 2 days.   Notices the swelling within 5 minutes o

## 2021-03-15 NOTE — TELEPHONE ENCOUNTER
Pt called he thinks that he might be allergic to his diabetic medication as his lips swell when he takes it.  Please advise

## 2021-03-15 NOTE — TELEPHONE ENCOUNTER
Try stopping the jardiance for a few days and see if it gets better. Continue metformin for now. Yes, call surgeon about knee please.

## 2021-03-16 DIAGNOSIS — E11.65 TYPE 2 DIABETES MELLITUS WITH HYPERGLYCEMIA, WITHOUT LONG-TERM CURRENT USE OF INSULIN (HCC): ICD-10-CM

## 2021-03-16 NOTE — TELEPHONE ENCOUNTER
Diabetic Medication Protocol [id number]/16/2021 09:58 AM   Microalbumin procedure in past 12 months or taking ACE/ARB Protocol Details    HgBA1C procedure resulted in past 6 months     Last HgBA1C < 7.5     Appointment in past 6 or next 3 months      Last OV

## 2021-04-01 ENCOUNTER — OFFICE VISIT (OUTPATIENT)
Dept: FAMILY MEDICINE CLINIC | Facility: CLINIC | Age: 61
End: 2021-04-01
Payer: COMMERCIAL

## 2021-04-01 VITALS
HEART RATE: 87 BPM | TEMPERATURE: 98 F | SYSTOLIC BLOOD PRESSURE: 134 MMHG | OXYGEN SATURATION: 98 % | RESPIRATION RATE: 16 BRPM | BODY MASS INDEX: 32 KG/M2 | DIASTOLIC BLOOD PRESSURE: 80 MMHG | WEIGHT: 229 LBS

## 2021-04-01 DIAGNOSIS — R22.0 SWELLING OF UPPER LIP: ICD-10-CM

## 2021-04-01 DIAGNOSIS — T78.3XXA ANGIOEDEMA, INITIAL ENCOUNTER: Primary | ICD-10-CM

## 2021-04-01 PROCEDURE — 3079F DIAST BP 80-89 MM HG: CPT | Performed by: FAMILY MEDICINE

## 2021-04-01 PROCEDURE — 3075F SYST BP GE 130 - 139MM HG: CPT | Performed by: FAMILY MEDICINE

## 2021-04-01 PROCEDURE — 99213 OFFICE O/P EST LOW 20 MIN: CPT | Performed by: FAMILY MEDICINE

## 2021-04-01 NOTE — PROGRESS NOTES
Ana Valdovinos is a 61year old male. Patient presents with:  Mouth/Lip Problem: swelling in upper lip      HPI:   At 10 am, his upper lip started swelling.  He hadn't eaten anything and wasn't doing anything unusual.   This happened 1 other time a week a Calculus of kidney    • Cellulitis    • Conjunctivitis    • COVID-19 virus infection 5/7/2020   • Deep vein thrombosis ([facility] 2011    left arm, after bowel obstruction, medicine related per patient   • Depression    • Diabetes ([facility]    • Neuropathy     \"wh swollen, non-tender, no redness.    NECK: supple,no adenopathy,   LUNGS: clear to auscultation  CARDIO: RRR without murmur  GI: good BS's,no masses, HSM or tenderness  EXTREMITIES: no cyanosis, clubbing or edema    ASSESSMENT AND PLAN:     Angioedema, initi

## 2021-04-30 DIAGNOSIS — E11.9 NEW ONSET TYPE 2 DIABETES MELLITUS (HCC): ICD-10-CM

## 2021-05-24 ENCOUNTER — OFFICE VISIT (OUTPATIENT)
Dept: FAMILY MEDICINE CLINIC | Facility: CLINIC | Age: 61
End: 2021-05-24
Payer: COMMERCIAL

## 2021-05-24 VITALS
BODY MASS INDEX: 30.8 KG/M2 | HEIGHT: 71 IN | TEMPERATURE: 98 F | OXYGEN SATURATION: 96 % | HEART RATE: 78 BPM | RESPIRATION RATE: 16 BRPM | WEIGHT: 220 LBS | DIASTOLIC BLOOD PRESSURE: 74 MMHG | SYSTOLIC BLOOD PRESSURE: 130 MMHG

## 2021-05-24 DIAGNOSIS — G47.33 OSA ON CPAP: Primary | ICD-10-CM

## 2021-05-24 DIAGNOSIS — Z99.89 OSA ON CPAP: Primary | ICD-10-CM

## 2021-05-24 PROCEDURE — 3008F BODY MASS INDEX DOCD: CPT | Performed by: FAMILY MEDICINE

## 2021-05-24 PROCEDURE — 99214 OFFICE O/P EST MOD 30 MIN: CPT | Performed by: FAMILY MEDICINE

## 2021-05-24 PROCEDURE — 3075F SYST BP GE 130 - 139MM HG: CPT | Performed by: FAMILY MEDICINE

## 2021-05-24 PROCEDURE — 3078F DIAST BP <80 MM HG: CPT | Performed by: FAMILY MEDICINE

## 2021-05-24 RX ORDER — DOXEPIN HYDROCHLORIDE 10 MG/1
10 CAPSULE ORAL NIGHTLY
Qty: 30 CAPSULE | Refills: 1 | Status: SHIPPED | OUTPATIENT
Start: 2021-05-24 | End: 2021-07-29

## 2021-05-24 NOTE — PROGRESS NOTES
[facility]  [facility]        HPI:   This is a 61year old male coming in for Patient presents with:  Obstructive Sleep Apnea ([facility])      HPI:   He notes that he wakes up and then doesn't use his pap after going to the bathroom. Diagnosis Date   • Acute bronchitis    • Acute respiratory failure due to COVID-19 [facility]) 5/6/2020   • Anesthesia complication     agitated upon waking with  cervical fusion   • Back problem     cervical surgery   • Bowel obstruction [facility]) March 2015   • Psychiatric Son         Suicide attempt   • Other (Other) Son         POTS, cyclical vomiting    • Other (Other) Father         cholecytectomy    • Other (gout) Father    • Diabetes Maternal Grandfather    • Diabetes Brother    • Other (rheumatoid arthriti (deep vein thrombosis)     Thrombocytopenia (HCC)     Elevated liver enzymes     New onset type 2 diabetes mellitus (HCC)     Loosening of knee joint prosthesis (HCC)     Age-related nuclear cataract of both eyes      REVIEW OF SYSTEMS:   Review of Systems sounds: Normal heart sounds. Pulmonary:      Effort: Pulmonary effort is normal.      Breath sounds: Normal breath sounds. No stridor. Abdominal:      General: Bowel sounds are normal.      Palpations: Abdomen is soft.    Musculoskeletal:      Cervical MG Oral Cap 30 capsule 1     Sig: Take 1 capsule (10 mg total) by mouth nightly. Outcome: Parent verbalizes understanding. Parent is notified to call with any questions, complications, allergies, or worsening or changing symptoms.   Parent is to call

## 2021-05-27 DIAGNOSIS — F32.1 CURRENT MODERATE EPISODE OF MAJOR DEPRESSIVE DISORDER WITHOUT PRIOR EPISODE (HCC): ICD-10-CM

## 2021-05-27 RX ORDER — DESVENLAFAXINE 50 MG/1
TABLET, EXTENDED RELEASE ORAL
Qty: 90 TABLET | Refills: 0 | Status: SHIPPED | OUTPATIENT
Start: 2021-05-27 | End: 2021-08-16

## 2021-06-17 ENCOUNTER — OFFICE VISIT (OUTPATIENT)
Dept: FAMILY MEDICINE CLINIC | Facility: CLINIC | Age: 61
End: 2021-06-17
Payer: COMMERCIAL

## 2021-06-17 VITALS
HEART RATE: 82 BPM | WEIGHT: 222 LBS | BODY MASS INDEX: 31.08 KG/M2 | TEMPERATURE: 97 F | SYSTOLIC BLOOD PRESSURE: 122 MMHG | RESPIRATION RATE: 18 BRPM | DIASTOLIC BLOOD PRESSURE: 80 MMHG | OXYGEN SATURATION: 99 % | HEIGHT: 71 IN

## 2021-06-17 DIAGNOSIS — G47.33 OSA (OBSTRUCTIVE SLEEP APNEA): Primary | ICD-10-CM

## 2021-06-17 PROCEDURE — 3074F SYST BP LT 130 MM HG: CPT | Performed by: FAMILY MEDICINE

## 2021-06-17 PROCEDURE — 3079F DIAST BP 80-89 MM HG: CPT | Performed by: FAMILY MEDICINE

## 2021-06-17 PROCEDURE — 99214 OFFICE O/P EST MOD 30 MIN: CPT | Performed by: FAMILY MEDICINE

## 2021-06-17 PROCEDURE — 3008F BODY MASS INDEX DOCD: CPT | Performed by: FAMILY MEDICINE

## 2021-06-17 NOTE — PROGRESS NOTES
[facility]  SLE[facility]        HPI:   This is a 61year old male coming in for Patient presents with:  Obstructive Sleep Apnea ([place])      HPI:  Patient states that he goes to sleep and then wakes up an hour later as it is blowing obstruction [facility]) March 2015   • Calculus of kidney    • Cellulitis    • Conjunctivitis    • COVID-19 virus infection 5/7/2020   • Deep vein thrombosis ([facility] [address] 2011    left arm, after bowel obstruction, medicine related per patient   • Depression    • Diabet • Other (rheumatoid arthritis) Maternal Grandmother      Allergies:    Tramadol                NAUSEA ONLY    Comment:Head ache  Sulfa Antibiotics       HIVES  Eggs Or Egg-Derived*    DIARRHEA  Norco [Hydrocodone-*    NAUSEA ONLY    Comment:Headaches SYSTEMS:   Review of Systems   Constitutional: Negative. HENT: Negative. Eyes: Negative. Respiratory: Positive for apnea. Cardiovascular: Negative. Neurological: Negative. Psychiatric/Behavioral: Positive for sleep disturbance.        EXAM Musculoskeletal:      Cervical back: Normal range of motion and neck supple. Comments: Gait normal   Lymphadenopathy:      Cervical: No cervical adenopathy. Skin:     General: Skin is warm and dry.    Neurological:      Mental Status: He is alert a machine until you can have it fixed or replaced. There have been no serious adverse side effects and the recall is out of the abundance of caution. Please call your DME company to see what their options are for replacement.    Les Pickett has issued a pl today.     \" This note was created utilizing Dragon speech recognition software. Please excuse any grammatical errors. Call my office if you have any questions regarding this note.  \"     Alysa Dick MD  6/17/2021  3:30 PM

## 2021-07-28 DIAGNOSIS — E11.9 NEW ONSET TYPE 2 DIABETES MELLITUS (HCC): ICD-10-CM

## 2021-07-28 NOTE — TELEPHONE ENCOUNTER
Future appt:     Your appointments     Date & Time Appointment Department [facility])    Sep 17, 2021  2:00 PM CDT Sleep Follow Up with Gem Maravilla MD [address] [facility], [facility] ([facility])            [employer]

## 2021-07-28 NOTE — TELEPHONE ENCOUNTER
He is definitely due to see me for DM follow up and overall physical/preventative health care. Please schedule and we'll take care of labs then. Script sent in the mean time.

## 2021-07-28 NOTE — TELEPHONE ENCOUNTER
Patient notified and verbalized understanding.    appt scheduled  Future Appointments   Date Time Provider Alok Darling   8/24/2021  1:00 PM Booker Ward [facility] [place]   9/17/2021  2:00 PM Verner Cones, MD [facility] [place]

## 2021-07-28 NOTE — TELEPHONE ENCOUNTER
Last OV 4/1/21  Last labs 10/16/2020  A1c 6.0  Last refilled 4/30/21 #30  2 refill      Routed to KE to advise if labs due

## 2021-07-29 RX ORDER — DOXEPIN HYDROCHLORIDE 10 MG/1
CAPSULE ORAL
Qty: 30 CAPSULE | Refills: 1 | Status: SHIPPED | OUTPATIENT
Start: 2021-07-29

## 2021-08-02 DIAGNOSIS — E11.65 TYPE 2 DIABETES MELLITUS WITH HYPERGLYCEMIA, WITHOUT LONG-TERM CURRENT USE OF INSULIN (HCC): ICD-10-CM

## 2021-08-02 NOTE — TELEPHONE ENCOUNTER
JIE Newsome Metformin    LRF 3/16/21 # 360  LOV  4/1/21  Lab Results   Component Value Date     10/16/2020    A1C 6.0 (H) 10/16/2020     Future Appointments   Date Time Provider Alok Darling   8/24/2021  1:00 PM Donnie Luna [facility]

## 2021-08-16 DIAGNOSIS — F32.1 CURRENT MODERATE EPISODE OF MAJOR DEPRESSIVE DISORDER WITHOUT PRIOR EPISODE (HCC): ICD-10-CM

## 2021-08-16 RX ORDER — DESVENLAFAXINE 50 MG/1
TABLET, EXTENDED RELEASE ORAL
Qty: 90 TABLET | Refills: 1 | Status: SHIPPED | OUTPATIENT
Start: 2021-08-16

## 2021-08-24 ENCOUNTER — OFFICE VISIT (OUTPATIENT)
Dept: FAMILY MEDICINE CLINIC | Facility: CLINIC | Age: 61
End: 2021-08-24
Payer: COMMERCIAL

## 2021-08-24 VITALS
BODY MASS INDEX: 31.5 KG/M2 | OXYGEN SATURATION: 98 % | RESPIRATION RATE: 16 BRPM | HEART RATE: 75 BPM | TEMPERATURE: 98 F | HEIGHT: 71 IN | DIASTOLIC BLOOD PRESSURE: 70 MMHG | SYSTOLIC BLOOD PRESSURE: 122 MMHG | WEIGHT: 225 LBS

## 2021-08-24 DIAGNOSIS — Z12.11 SCREENING FOR COLON CANCER: ICD-10-CM

## 2021-08-24 DIAGNOSIS — E11.65 TYPE 2 DIABETES MELLITUS WITH HYPERGLYCEMIA, WITHOUT LONG-TERM CURRENT USE OF INSULIN (HCC): ICD-10-CM

## 2021-08-24 DIAGNOSIS — Z00.00 HEALTHY ADULT ON ROUTINE PHYSICAL EXAMINATION: Primary | ICD-10-CM

## 2021-08-24 DIAGNOSIS — Z12.5 SCREENING FOR MALIGNANT NEOPLASM OF PROSTATE: ICD-10-CM

## 2021-08-24 LAB
ALBUMIN SERPL-MCNC: 4.2 G/DL (ref 3.4–5)
ALBUMIN/GLOB SERPL: 1 {RATIO} (ref 1–2)
ALP LIVER SERPL-CCNC: 107 U/L
ALT SERPL-CCNC: 89 U/L
ANION GAP SERPL CALC-SCNC: 3 MMOL/L (ref 0–18)
AST SERPL-CCNC: 50 U/L (ref 15–37)
BASOPHILS # BLD AUTO: 0.05 X10(3) UL (ref 0–0.2)
BASOPHILS NFR BLD AUTO: 0.7 %
BILIRUB SERPL-MCNC: 1.1 MG/DL (ref 0.1–2)
BUN BLD-MCNC: 14 MG/DL (ref 7–18)
CALCIUM BLD-MCNC: 9.4 MG/DL (ref 8.5–10.1)
CHLORIDE SERPL-SCNC: 107 MMOL/L (ref 98–112)
CHOLEST SMN-MCNC: 205 MG/DL (ref ?–200)
CO2 SERPL-SCNC: 30 MMOL/L (ref 21–32)
COMPLEXED PSA SERPL-MCNC: 0.5 NG/ML (ref ?–4)
CREAT BLD-MCNC: 0.84 MG/DL
EOSINOPHIL # BLD AUTO: 0.25 X10(3) UL (ref 0–0.7)
EOSINOPHIL NFR BLD AUTO: 3.7 %
ERYTHROCYTE [DISTWIDTH] IN BLOOD BY AUTOMATED COUNT: 13.5 %
GLOBULIN PLAS-MCNC: 4.2 G/DL (ref 2.8–4.4)
GLUCOSE BLD-MCNC: 78 MG/DL (ref 70–99)
HCT VFR BLD AUTO: 46.3 %
HDLC SERPL-MCNC: 54 MG/DL (ref 40–59)
HGB BLD-MCNC: 15.2 G/DL
IMM GRANULOCYTES # BLD AUTO: 0.05 X10(3) UL (ref 0–1)
IMM GRANULOCYTES NFR BLD: 0.7 %
LDLC SERPL CALC-MCNC: 132 MG/DL (ref ?–100)
LYMPHOCYTES # BLD AUTO: 2.72 X10(3) UL (ref 1–4)
LYMPHOCYTES NFR BLD AUTO: 39.8 %
M PROTEIN MFR SERPL ELPH: 8.4 G/DL (ref 6.4–8.2)
MCH RBC QN AUTO: 30.6 PG (ref 26–34)
MCHC RBC AUTO-ENTMCNC: 32.8 G/DL (ref 31–37)
MCV RBC AUTO: 93.3 FL
MONOCYTES # BLD AUTO: 0.75 X10(3) UL (ref 0.1–1)
MONOCYTES NFR BLD AUTO: 11 %
NEUTROPHILS # BLD AUTO: 3.01 X10 (3) UL (ref 1.5–7.7)
NEUTROPHILS # BLD AUTO: 3.01 X10(3) UL (ref 1.5–7.7)
NEUTROPHILS NFR BLD AUTO: 44.1 %
NONHDLC SERPL-MCNC: 151 MG/DL (ref ?–130)
OSMOLALITY SERPL CALC.SUM OF ELEC: 289 MOSM/KG (ref 275–295)
PATIENT FASTING Y/N/NP: NO
PATIENT FASTING Y/N/NP: NO
PLATELET # BLD AUTO: 100 10(3)UL (ref 150–450)
POTASSIUM SERPL-SCNC: 4.2 MMOL/L (ref 3.5–5.1)
RBC # BLD AUTO: 4.96 X10(6)UL
SODIUM SERPL-SCNC: 140 MMOL/L (ref 136–145)
TRIGL SERPL-MCNC: 106 MG/DL (ref 30–149)
VLDLC SERPL CALC-MCNC: 19 MG/DL (ref 0–30)
WBC # BLD AUTO: 6.8 X10(3) UL (ref 4–11)

## 2021-08-24 PROCEDURE — 3008F BODY MASS INDEX DOCD: CPT | Performed by: FAMILY MEDICINE

## 2021-08-24 PROCEDURE — 80061 LIPID PANEL: CPT | Performed by: FAMILY MEDICINE

## 2021-08-24 PROCEDURE — 83036 HEMOGLOBIN GLYCOSYLATED A1C: CPT | Performed by: FAMILY MEDICINE

## 2021-08-24 PROCEDURE — 3074F SYST BP LT 130 MM HG: CPT | Performed by: FAMILY MEDICINE

## 2021-08-24 PROCEDURE — 82570 ASSAY OF URINE CREATININE: CPT | Performed by: FAMILY MEDICINE

## 2021-08-24 PROCEDURE — 99396 PREV VISIT EST AGE 40-64: CPT | Performed by: FAMILY MEDICINE

## 2021-08-24 PROCEDURE — 80053 COMPREHEN METABOLIC PANEL: CPT | Performed by: FAMILY MEDICINE

## 2021-08-24 PROCEDURE — 82043 UR ALBUMIN QUANTITATIVE: CPT | Performed by: FAMILY MEDICINE

## 2021-08-24 PROCEDURE — 85025 COMPLETE CBC W/AUTO DIFF WBC: CPT | Performed by: FAMILY MEDICINE

## 2021-08-24 PROCEDURE — 84153 ASSAY OF PSA TOTAL: CPT | Performed by: FAMILY MEDICINE

## 2021-08-24 PROCEDURE — 3078F DIAST BP <80 MM HG: CPT | Performed by: FAMILY MEDICINE

## 2021-08-24 NOTE — PROGRESS NOTES
Casimiro Corea is a 61year old male who presents for a complete physical exam.   HPI:   Pt complains of issues below. Still struggling with joint issues. Saw ortho on Friday. Xray and labs were done. Pt thinks they are infected again.      Sugars ha (two) times daily with meals. 360 tablet 0   • DOXEPIN 10 MG Oral Cap TAKE 1 CAPSULE(10 MG) BY MOUTH EVERY NIGHT 30 capsule 1   • empagliflozin (JARDIANCE) 10 MG Oral Tab Take 1 tablet (10 mg total) by mouth daily.  30 tablet 2   • Glucose Blood (ONETOUCH V 3/2011    cervical vertebral fusion   • TOTAL KNEE REPLACEMENT Right    • VASECTOMY  1999      Family History   Problem Relation Age of Onset   • Cancer Mother         brain   • Cancer Paternal Grandfather         brain   • Psychiatric Son         Suicide supple,no adenopathy  CHEST: no chest tenderness  BREAST: no dominant or suspicious mass  LUNGS: clear to auscultation  CARDIO: RRR without murmur  GI: good BS's,no masses, HSM or tenderness  : deferred   MUSCULOSKELETAL: back is not tender,FROM of the b

## 2021-08-25 LAB
CREAT UR-SCNC: 110 MG/DL
EST. AVERAGE GLUCOSE BLD GHB EST-MCNC: 120 MG/DL (ref 68–126)
HBA1C MFR BLD HPLC: 5.8 % (ref ?–5.7)
MICROALBUMIN UR-MCNC: 1.09 MG/DL
MICROALBUMIN/CREAT 24H UR-RTO: 9.9 UG/MG (ref ?–30)

## 2021-11-17 ENCOUNTER — HOSPITAL ENCOUNTER (OUTPATIENT)
Dept: ULTRASOUND IMAGING | Facility: HOSPITAL | Age: 61
Discharge: HOME | End: 2021-11-17
Attending: ORTHOPAEDIC SURGERY
Payer: COMMERCIAL

## 2021-11-17 ENCOUNTER — HOSPITAL ENCOUNTER (OUTPATIENT)
Dept: GENERAL RADIOLOGY | Facility: HOSPITAL | Age: 61
Discharge: HOME | End: 2021-11-17
Attending: ORTHOPAEDIC SURGERY
Payer: COMMERCIAL

## 2021-11-17 DIAGNOSIS — T84.9XXA: ICD-10-CM

## 2021-11-17 DIAGNOSIS — Z96.612: ICD-10-CM

## 2021-11-17 PROCEDURE — 89060 EXAM SYNOVIAL FLUID CRYSTALS: CPT

## 2021-11-17 PROCEDURE — 89050 BODY FLUID CELL COUNT: CPT

## 2021-11-17 PROCEDURE — 20611 DRAIN/INJ JOINT/BURSA W/US: CPT | Performed by: ORTHOPAEDIC SURGERY

## 2021-11-17 PROCEDURE — 87205 SMEAR GRAM STAIN: CPT

## 2021-11-17 PROCEDURE — 87070 CULTURE OTHR SPECIMN AEROBIC: CPT

## 2021-11-27 DIAGNOSIS — E11.65 TYPE 2 DIABETES MELLITUS WITH HYPERGLYCEMIA, WITHOUT LONG-TERM CURRENT USE OF INSULIN (HCC): ICD-10-CM

## 2021-11-27 NOTE — TELEPHONE ENCOUNTER
Diabetic Medication Protocol Passed 11/27/2021 11:01 AM   Protocol Details  HgBA1C procedure resulted in past 6 months    Last HgBA1C < 7.5    Microalbumin procedure in past 12 months or taking ACE/ARB    Appointment in past 6 or next 3 months     Refilled

## 2021-12-07 ENCOUNTER — TELEPHONE (OUTPATIENT)
Dept: FAMILY MEDICINE CLINIC | Facility: CLINIC | Age: 61
End: 2021-12-07

## 2021-12-07 DIAGNOSIS — E11.9 NEW ONSET TYPE 2 DIABETES MELLITUS (HCC): ICD-10-CM

## 2021-12-07 NOTE — TELEPHONE ENCOUNTER
Letter mailed to patient reminding him he is due for labs.     Lab Frequency Next Occurrence   LIPID PANEL Once 11/30/2021   HEMOGLOBIN A1C Once 11/30/2021   HEPATIC FUNCTION PANEL (7) Once 11/30/2021     Hakan Bruno RN  [facility] Nurse  Recall

## 2021-12-09 ENCOUNTER — MED REC SCAN ONLY (OUTPATIENT)
Dept: FAMILY MEDICINE CLINIC | Facility: CLINIC | Age: 61
End: 2021-12-09

## 2021-12-30 ENCOUNTER — TELEPHONE (OUTPATIENT)
Dept: FAMILY MEDICINE CLINIC | Facility: CLINIC | Age: 61
End: 2021-12-30

## 2022-01-20 ENCOUNTER — TELEPHONE (OUTPATIENT)
Dept: FAMILY MEDICINE CLINIC | Facility: CLINIC | Age: 62
End: 2022-01-20

## 2022-01-20 DIAGNOSIS — R05.9 COUGH: Primary | ICD-10-CM

## 2022-01-20 NOTE — TELEPHONE ENCOUNTER
Patient called stating that he tested negative for covid back in December, but had a bad cough. Cough was gone, but it came back last night. Wants to know if he can get a cough medication or does he have to be seen by KE.     Please advise # [phone]

## 2022-01-20 NOTE — TELEPHONE ENCOUNTER
Patient states his cough started last night. States if he breaths in deep he feels a tickle in his throat and he starts coughing. No wheezing, congestion or fever. Only symptoms is cough. States it is worse at night. States he does not have asthma.

## 2022-01-21 RX ORDER — BENZONATATE 200 MG/1
200 CAPSULE ORAL 3 TIMES DAILY PRN
Qty: 30 CAPSULE | Refills: 0 | Status: SHIPPED | OUTPATIENT
Start: 2022-01-21

## 2022-01-21 NOTE — TELEPHONE ENCOUNTER
This is likely a whole different cough than in December. So I would encourage him to retest for COVID before going out anywhere, or just stay home for at least 5 days. I can send in a cough med, but if worsening, please schedule OV or VV.    I recommend

## 2022-01-21 NOTE — TELEPHONE ENCOUNTER
Patient notified and verbalized understanding. States he has a home test he will check with. States he does not think he has tried tessalon in the past but is agreeable.   Uses [facility] [address]

## 2022-01-24 ENCOUNTER — TELEPHONE (OUTPATIENT)
Dept: FAMILY MEDICINE CLINIC | Facility: CLINIC | Age: 62
End: 2022-01-24

## 2022-01-24 NOTE — TELEPHONE ENCOUNTER
Eye exam report received from [facility] exam 1/12/22, no retinopathy noted.     flowsheet updated  Report to scanning

## 2022-02-15 RX ORDER — DESVENLAFAXINE 50 MG/1
TABLET, EXTENDED RELEASE ORAL
Qty: 90 TABLET | Refills: 1 | Status: SHIPPED | OUTPATIENT
Start: 2022-02-15

## 2022-03-27 NOTE — PATIENT INSTRUCTIONS
- History of Present Illness


Time Seen by Provider: 03/27/22 17:31


Source: patient, family


Exam Limitations: no limitations


Patient Subjective Stated Complaint: Mother states patient had ear infection and

had 10 days of cefdinir which ended 3/21. [facility] patient started having pain 

again around 1030 and drainage out of R ear. States drainage green/yellow.


Triage Nursing Assessment: Patient to ed with ear infection suspect by mom. 

Patient pulling at R ear, afebrile at this time. Dry drainage on R ear and upper

respiratory congestion.


Physician History: 





3-year-old with history of multiple otitis media in the past needing myringotomy

tube placement which fell off presented in the ER with right earache and fever 

since morning.  Mom has been using Tylenol with symptomatic relief.  This 

afternoon started to have yellow-green discharge from right ear.  Also has 

minimal nonproductive cough.  Recently finished course of Omnicef for otitis 

media.


Presenting Symptoms: fever, ear pain, pulling at ears, cough


Timing/Duration: today


Treatment Prior to Arrival: acetaminophen


Severity of Pain-Max: moderate


Severity of Pain-Current: mild


Associated Symptoms: cough, fever


Allergies/Adverse Reactions: 








No Known Drug Allergies Allergy (Verified 03/27/22 17:50)


   





Hx Tetanus, Diphtheria Vaccination/Date Given: Yes


Hx Influenza Vaccination/Date Given: No


Hx Pneumococcal Vaccination/Date Given: No


Immunizations Up to Date: Yes





Travel Risk





- International Travel


Have you traveled outside of the country in past 3 weeks: No (N)


If Yes, where;: N





- Coronavirus Screening


Are you exhibiting any of the following symptoms?: Yes


Close contact with a COVID-19 positive Pt in past 14-21 Days: No





- Review of Systems


Constitutional: Fever


Eyes: No Symptoms


Ears, Nose, & Throat: Ear Pain, Ear Discharge, Nose Congestion


Respiratory: No Symptoms


Cardiac: No Symptoms


Abdominal/Gastrointestinal: No Symptoms


Musculoskeletal: No Symptoms


Skin: No Symptoms


Neurological: No Symptoms


Psychological: No Symptoms


Endocrine: No Symptoms


Hematologic/Lymphatic: No Symptoms


Immunological/Allergic: No Symptoms





- Past Medical History


Pertinent Past Medical History: No


Neurological History: No Pertinent History


ENT History: No Pertinent History


Cardiac History: No Pertinent History


Respiratory History: No Pertinent History


Endocrine Medical History: No Pertinent History


Musculoskeletal History: No Pertinent History


GI Medical History: No Pertinent History


 History: No Pertinent History


Psycho-Social History: No Pertinent History


Male Reproductive Disorders: No Pertinent History


Other Medical History: croup, URI, frequent ear infections. reflux





- Past Surgical History


Past Surgical History: No


Neuro Surgical History: No Pertinent History


Cardiac: No Pertinent History


Respiratory: No Pertinent History


Gastrointestinal: No Pertinent History


Genitourinary: No Pertinent History


Other Surgical History: tubes in ears at 1 year old





- Social History


Smoking Status: Never smoker


Exposure to second hand smoke: Yes


Drug Use: none


Patient Lives Alone: No





- Nursing Vital Signs


Nursing Vital Signs: 


                               Initial Vital Signs











Temperature  99.2 F   03/27/22 17:34


 


Pulse Rate  122 H  03/27/22 17:34


 


Respiratory Rate  20   03/27/22 17:34


 


Blood Pressure  115/78   03/27/22 17:34


 


O2 Sat by Pulse Oximetry  96   03/27/22 17:34








                                   Pain Scale











Pain Intensity                 2

















- Physical Exam


General Appearance: No apparent distress, active, non-toxic, playing, smiles, 

attentiveness nml, interactive


Head, Eyes, Nose, & Throat Exam: head inspection normal, PERRL, EOMI, intact red

reflex


Ear Exam: right ear: discharge, TM perforation, bilateral ear: auricle normal, c

anal normal, other (No mastoid tenderness)


Neck Exam: normal inspection, non-tender, supple, full range of motion, No 

meningismus


Respiratory Exam: normal breath sounds, lungs clear


Cardiovascular Exam: regular rate/rhythm, normal heart sounds


Gastrointestinal Exam: soft, No tenderness


Extremities Exam: normal inspection, normal range of motion


Neurologic Exam: alert, cooperative, uncooperative, CNs II-XII nml as tested


Skin Exam: normal color, warm


**SpO2 Interpretation**: normal


Spo2: 96


O2 Delivery: Room Air


Ordered Tests: 


Medication Summary











Generic Name Dose Route Start Last Admin





  Trade Name Freq  PRN Reason Stop Dose Admin


 


Amoxicillin  500 mg  03/27/22 22:00 





  Amoxicillin 400 Mg/5 Ml Susp.Recon  PO  04/26/22 21:59 





  BID GRACIELA  


 


Ceftriaxone Sodium  1,000 mg  03/27/22 18:23 





  Ceftriaxone Sodium 1000 Mg Inj Vial  IM  03/27/22 18:24 





  STAT ONE  














Discontinued Medications














Generic Name Dose Route Start Last Admin





  Trade Name Freq  PRN Reason Stop Dose Admin


 


Ofloxacin  5 ml  03/27/22 18:04 





  Ofloxacin 5 Ml Ear Drops  OT  03/27/22 18:05 





  STAT ONE  














- Progress


Progress: unchanged


Progress Note: 





03/27/22 18:13


Has otitis media with perforation.  Given Rocephin shot.  Started on oral and 

topical antibiotics.  Outpatient follow-up recommended.  Tylenol/ibuprofen as 

needed for fever control.


03/27/22 18:24





Counseled pt/family regarding: diagnosis, need for follow-up





- Departure


Departure Disposition: Home


Clinical Impression: 


Otitis media


Qualifiers:


 Otitis media type: suppurative Chronicity: acute Laterality: right Recurrence: 

recurrent Spontaneous tympanic membrane rupture: with spontaneous rupture 

Qualified Code(s): H66.014 - Acute suppurative otitis media with spontaneous 

rupture of ear drum, recurrent, right ear





Condition: Stable


Critical Care Time: No


Referrals: 


DARI INFANTE [Primary Care Provider] - Follow up/PCP as directed 

(Tomorrow for reevaluation)


Instructions:  Ear Infections (Otitis Media) in Children (DC), Fever, Children 3

Months to 3 Years Old (DC)


Additional Instructions: 


Tylenol/ibuprofen as needed for fever greater than 100.4 alternate every 4 hours

as needed.  Follow-up with primary care for reevaluation.  Return to ER for any 

worsening. 1. Rest. Drink plenty of fluids. 2. Tylenol/Ibuprofen for pain/fevers. 3. Salt water gargles three times daily  4. Use humidifier at home when possible. 5. The rapid strep test was negative today.  We will send a throat culture to lab and call you with giana Prevention tips include the following:  · Stop smoking or reduce contact with secondhand smoke. Smoke irritates the tender throat lining. · Limit contact with pets and with allergy-causing substances, such as pollen and mold.   · When you’re around someone · Saline nasal sprays and decongestant tablets help open a stuffy nose. Antihistamines can also help, but they can cause side effects such as drowsiness and drying of the eyes, nose, and mouth.   Soothe a sore throat and cough  · Gargle every 2 hours with 1

## 2022-06-16 ENCOUNTER — TELEPHONE (OUTPATIENT)
Dept: FAMILY MEDICINE CLINIC | Facility: CLINIC | Age: 62
End: 2022-06-16

## 2022-06-16 NOTE — TELEPHONE ENCOUNTER
Fax from [place] [phone] stating patient scheduled for surgery 8/16/22 and needs preop H&P within 30 days of procedure.     Testliohart sent    Order in blue book

## 2022-06-21 NOTE — TELEPHONE ENCOUNTER
Patient notified and verbalized understanding.    Future Appointments   Date Time Provider Alok Darling   7/28/2022 10:45 AM DO FACUNDO Joya [facility] Rodriguez

## 2022-07-27 DIAGNOSIS — F32.1 CURRENT MODERATE EPISODE OF MAJOR DEPRESSIVE DISORDER WITHOUT PRIOR EPISODE (HCC): ICD-10-CM

## 2022-07-27 NOTE — TELEPHONE ENCOUNTER
Last refilled 2/15/22 for #90 with 1 RF  LOV with KE 8/24/21  Future appt with pcp 7/28/22  Protocol: none

## 2022-07-28 ENCOUNTER — OFFICE VISIT (OUTPATIENT)
Dept: FAMILY MEDICINE CLINIC | Facility: CLINIC | Age: 62
End: 2022-07-28
Payer: COMMERCIAL

## 2022-07-28 VITALS
TEMPERATURE: 97 F | WEIGHT: 232 LBS | SYSTOLIC BLOOD PRESSURE: 132 MMHG | BODY MASS INDEX: 32.48 KG/M2 | DIASTOLIC BLOOD PRESSURE: 84 MMHG | HEART RATE: 70 BPM | HEIGHT: 71 IN | OXYGEN SATURATION: 98 %

## 2022-07-28 DIAGNOSIS — M17.11 PRIMARY OSTEOARTHRITIS OF RIGHT KNEE: ICD-10-CM

## 2022-07-28 DIAGNOSIS — G89.29 CHRONIC LEFT SHOULDER PAIN: ICD-10-CM

## 2022-07-28 DIAGNOSIS — Z01.818 PRE-OPERATIVE EXAMINATION: Primary | ICD-10-CM

## 2022-07-28 DIAGNOSIS — E11.65 TYPE 2 DIABETES MELLITUS WITH HYPERGLYCEMIA, WITHOUT LONG-TERM CURRENT USE OF INSULIN (HCC): ICD-10-CM

## 2022-07-28 DIAGNOSIS — G47.33 OSA ON CPAP: ICD-10-CM

## 2022-07-28 DIAGNOSIS — M19.012 OSTEOARTHRITIS OF GLENOHUMERAL JOINT, LEFT: ICD-10-CM

## 2022-07-28 DIAGNOSIS — Z99.89 OSA ON CPAP: ICD-10-CM

## 2022-07-28 DIAGNOSIS — G60.9 IDIOPATHIC PERIPHERAL NEUROPATHY: ICD-10-CM

## 2022-07-28 DIAGNOSIS — M25.512 CHRONIC LEFT SHOULDER PAIN: ICD-10-CM

## 2022-07-28 LAB
ALBUMIN SERPL-MCNC: 3.6 G/DL (ref 3.4–5)
ALBUMIN/GLOB SERPL: 0.9 {RATIO} (ref 1–2)
ALP LIVER SERPL-CCNC: 109 U/L
ALT SERPL-CCNC: 61 U/L
ANION GAP SERPL CALC-SCNC: <0 MMOL/L (ref 0–18)
AST SERPL-CCNC: 29 U/L (ref 15–37)
BASOPHILS # BLD AUTO: 0.01 X10(3) UL (ref 0–0.2)
BASOPHILS NFR BLD AUTO: 0.1 %
BILIRUB SERPL-MCNC: 0.7 MG/DL (ref 0.1–2)
BUN BLD-MCNC: 14 MG/DL (ref 7–18)
CALCIUM BLD-MCNC: 9.2 MG/DL (ref 8.5–10.1)
CHLORIDE SERPL-SCNC: 109 MMOL/L (ref 98–112)
CO2 SERPL-SCNC: 28 MMOL/L (ref 21–32)
CREAT BLD-MCNC: 0.66 MG/DL
EOSINOPHIL # BLD AUTO: 0.02 X10(3) UL (ref 0–0.7)
EOSINOPHIL NFR BLD AUTO: 0.3 %
ERYTHROCYTE [DISTWIDTH] IN BLOOD BY AUTOMATED COUNT: 13.2 %
EST. AVERAGE GLUCOSE BLD GHB EST-MCNC: 180 MG/DL (ref 68–126)
FASTING STATUS PATIENT QL REPORTED: NO
GLOBULIN PLAS-MCNC: 4 G/DL (ref 2.8–4.4)
GLUCOSE BLD-MCNC: 192 MG/DL (ref 70–99)
HBA1C MFR BLD: 7.9 % (ref ?–5.7)
HCT VFR BLD AUTO: 39.6 %
HGB BLD-MCNC: 13.6 G/DL
IMM GRANULOCYTES # BLD AUTO: 0.06 X10(3) UL (ref 0–1)
IMM GRANULOCYTES NFR BLD: 0.8 %
LYMPHOCYTES # BLD AUTO: 1.44 X10(3) UL (ref 1–4)
LYMPHOCYTES NFR BLD AUTO: 18.3 %
MCH RBC QN AUTO: 32.9 PG (ref 26–34)
MCHC RBC AUTO-ENTMCNC: 34.3 G/DL (ref 31–37)
MCV RBC AUTO: 95.7 FL
MONOCYTES # BLD AUTO: 0.49 X10(3) UL (ref 0.1–1)
MONOCYTES NFR BLD AUTO: 6.2 %
NEUTROPHILS # BLD AUTO: 5.83 X10 (3) UL (ref 1.5–7.7)
NEUTROPHILS # BLD AUTO: 5.83 X10(3) UL (ref 1.5–7.7)
NEUTROPHILS NFR BLD AUTO: 74.3 %
OSMOLALITY SERPL CALC.SUM OF ELEC: 288 MOSM/KG (ref 275–295)
PLATELET # BLD AUTO: 94 10(3)UL (ref 150–450)
POTASSIUM SERPL-SCNC: 4.1 MMOL/L (ref 3.5–5.1)
PROT SERPL-MCNC: 7.6 G/DL (ref 6.4–8.2)
RBC # BLD AUTO: 4.14 X10(6)UL
SODIUM SERPL-SCNC: 136 MMOL/L (ref 136–145)
WBC # BLD AUTO: 7.9 X10(3) UL (ref 4–11)

## 2022-07-28 PROCEDURE — 85025 COMPLETE CBC W/AUTO DIFF WBC: CPT | Performed by: FAMILY MEDICINE

## 2022-07-28 PROCEDURE — 3079F DIAST BP 80-89 MM HG: CPT | Performed by: FAMILY MEDICINE

## 2022-07-28 PROCEDURE — 3051F HG A1C>EQUAL 7.0%<8.0%: CPT | Performed by: FAMILY MEDICINE

## 2022-07-28 PROCEDURE — 99243 OFF/OP CNSLTJ NEW/EST LOW 30: CPT | Performed by: FAMILY MEDICINE

## 2022-07-28 PROCEDURE — 80053 COMPREHEN METABOLIC PANEL: CPT | Performed by: FAMILY MEDICINE

## 2022-07-28 PROCEDURE — 3008F BODY MASS INDEX DOCD: CPT | Performed by: FAMILY MEDICINE

## 2022-07-28 PROCEDURE — 83036 HEMOGLOBIN GLYCOSYLATED A1C: CPT | Performed by: FAMILY MEDICINE

## 2022-07-28 PROCEDURE — 3075F SYST BP GE 130 - 139MM HG: CPT | Performed by: FAMILY MEDICINE

## 2022-07-28 RX ORDER — DESVENLAFAXINE 50 MG/1
TABLET, EXTENDED RELEASE ORAL
Qty: 90 TABLET | Refills: 1 | Status: SHIPPED | OUTPATIENT
Start: 2022-07-28

## 2022-07-29 ENCOUNTER — TELEPHONE (OUTPATIENT)
Dept: FAMILY MEDICINE CLINIC | Facility: CLINIC | Age: 62
End: 2022-07-29

## 2022-08-12 ENCOUNTER — OFFICE VISIT (OUTPATIENT)
Dept: FAMILY MEDICINE CLINIC | Facility: CLINIC | Age: 62
End: 2022-08-12
Payer: COMMERCIAL

## 2022-08-12 VITALS
WEIGHT: 230 LBS | DIASTOLIC BLOOD PRESSURE: 70 MMHG | HEART RATE: 83 BPM | OXYGEN SATURATION: 96 % | BODY MASS INDEX: 32.2 KG/M2 | RESPIRATION RATE: 16 BRPM | HEIGHT: 71 IN | SYSTOLIC BLOOD PRESSURE: 126 MMHG | TEMPERATURE: 98 F

## 2022-08-12 DIAGNOSIS — J06.9 VIRAL URI: ICD-10-CM

## 2022-08-12 DIAGNOSIS — E11.65 TYPE 2 DIABETES MELLITUS WITH HYPERGLYCEMIA, WITHOUT LONG-TERM CURRENT USE OF INSULIN (HCC): Primary | ICD-10-CM

## 2022-08-12 PROCEDURE — 99214 OFFICE O/P EST MOD 30 MIN: CPT | Performed by: FAMILY MEDICINE

## 2022-08-12 PROCEDURE — 3074F SYST BP LT 130 MM HG: CPT | Performed by: FAMILY MEDICINE

## 2022-08-12 PROCEDURE — 3008F BODY MASS INDEX DOCD: CPT | Performed by: FAMILY MEDICINE

## 2022-08-12 PROCEDURE — 3078F DIAST BP <80 MM HG: CPT | Performed by: FAMILY MEDICINE

## 2022-09-29 ENCOUNTER — TELEPHONE (OUTPATIENT)
Dept: FAMILY MEDICINE CLINIC | Facility: CLINIC | Age: 62
End: 2022-09-29

## 2022-09-29 NOTE — TELEPHONE ENCOUNTER
FYI:    PT COMING IN FOR PREOP SURGERY - PUTTING SHOULDER BACK TOGETHER ON 11/22/22    Future Appointments   Date Time Provider Alok Darling   11/4/2022 11:00 AM Saima Gonzalez, Lokesh Lawrence, DO C[facility] [place]       PLEASE LOOK FOR ORDERS       [address]
Housestaff
Housestaff
housestaff, hospitalist
housestaff

## 2022-11-04 ENCOUNTER — OFFICE VISIT (OUTPATIENT)
Dept: FAMILY MEDICINE CLINIC | Facility: CLINIC | Age: 62
End: 2022-11-04
Payer: COMMERCIAL

## 2022-11-04 VITALS
WEIGHT: 227 LBS | OXYGEN SATURATION: 97 % | HEART RATE: 91 BPM | SYSTOLIC BLOOD PRESSURE: 128 MMHG | HEIGHT: 71 IN | BODY MASS INDEX: 31.78 KG/M2 | TEMPERATURE: 98 F | RESPIRATION RATE: 16 BRPM | DIASTOLIC BLOOD PRESSURE: 60 MMHG

## 2022-11-04 DIAGNOSIS — M19.012 OSTEOARTHRITIS OF GLENOHUMERAL JOINT, LEFT: ICD-10-CM

## 2022-11-04 DIAGNOSIS — Z99.89 OSA ON CPAP: ICD-10-CM

## 2022-11-04 DIAGNOSIS — Z01.818 PRE-OPERATIVE EXAMINATION: Primary | ICD-10-CM

## 2022-11-04 DIAGNOSIS — E11.65 TYPE 2 DIABETES MELLITUS WITH HYPERGLYCEMIA, WITHOUT LONG-TERM CURRENT USE OF INSULIN (HCC): ICD-10-CM

## 2022-11-04 DIAGNOSIS — G60.9 IDIOPATHIC PERIPHERAL NEUROPATHY: ICD-10-CM

## 2022-11-04 DIAGNOSIS — M17.11 PRIMARY OSTEOARTHRITIS OF RIGHT KNEE: ICD-10-CM

## 2022-11-04 DIAGNOSIS — Z86.19 HISTORY OF REMOVAL OF JOINT PROSTHESIS OF LEFT SHOULDER DUE TO INFECTION: ICD-10-CM

## 2022-11-04 DIAGNOSIS — Z98.890 HISTORY OF REMOVAL OF JOINT PROSTHESIS OF LEFT SHOULDER DUE TO INFECTION: ICD-10-CM

## 2022-11-04 DIAGNOSIS — G47.33 OSA ON CPAP: ICD-10-CM

## 2022-11-04 PROBLEM — T84.84XA PAINFUL ORTHOPAEDIC HARDWARE (HCC): Status: ACTIVE | Noted: 2022-10-10

## 2022-11-04 PROBLEM — T84.84XA PAINFUL ORTHOPAEDIC HARDWARE: Status: ACTIVE | Noted: 2022-10-10

## 2022-11-04 LAB
EST. AVERAGE GLUCOSE BLD GHB EST-MCNC: 186 MG/DL (ref 68–126)
HBA1C MFR BLD: 8.1 % (ref ?–5.7)

## 2022-11-04 PROCEDURE — 83036 HEMOGLOBIN GLYCOSYLATED A1C: CPT | Performed by: FAMILY MEDICINE

## 2022-11-07 ENCOUNTER — TELEPHONE (OUTPATIENT)
Dept: FAMILY MEDICINE CLINIC | Facility: CLINIC | Age: 62
End: 2022-11-07

## 2022-11-07 DIAGNOSIS — E11.65 TYPE 2 DIABETES MELLITUS WITH HYPERGLYCEMIA, WITHOUT LONG-TERM CURRENT USE OF INSULIN (HCC): Primary | ICD-10-CM

## 2022-11-07 NOTE — TELEPHONE ENCOUNTER
----- Message from Ankur Campo DO sent at 11/7/2022 10:39 AM CST -----  Pls let pt know that his A1c is high. We need to bring this down before surgery since infection has been his major problem. Lets add a medicine called januvia and see if that brings sugars down. He needs to check sugars 2-3 times daily so we can document they are getting better. I would consider repeating an A1c before surgery even though it's in only a couple of weeks also. If sugars stay high, I would postpone surgery. I ideally want an A1c under 7 given his infection history. Let me know if he is agreeable to [place] and Deneen.

## 2022-11-07 NOTE — TELEPHONE ENCOUNTER
Patient notified and verbalized understanding.      States he is agreeable to starting [place] and [place]    Routed to  for order

## 2022-11-07 NOTE — TELEPHONE ENCOUNTER
Script sent for [facility] [place] and [place]. Follow up with me with numbers in a week. (home glucose readings).

## 2022-11-08 NOTE — TELEPHONE ENCOUNTER
Has not read message.   Left message on voicemail/answering machine for patient to call office  Also advised mychart was sent

## 2022-11-11 ENCOUNTER — TELEPHONE (OUTPATIENT)
Dept: FAMILY MEDICINE CLINIC | Facility: CLINIC | Age: 62
End: 2022-11-11

## 2022-11-11 NOTE — TELEPHONE ENCOUNTER
Per KE, let ortho know patient sugars were running higher. New medication was started and need to f/u before cleared as sugars need to be better controlled prior to surgery     Called and left detailed message with [facility] reception.  They will forward message to surgery

## 2022-11-17 NOTE — TELEPHONE ENCOUNTER
Patient does not have appt scheduled, has not sent readings via [employer]. Called and spoke with patient and offered to schedule appt today at 415. Patient declined. Patient states he will send readings via [employer].

## 2022-11-18 ENCOUNTER — TELEPHONE (OUTPATIENT)
Dept: FAMILY MEDICINE CLINIC | Facility: CLINIC | Age: 62
End: 2022-11-18

## 2022-11-18 DIAGNOSIS — E11.65 TYPE 2 DIABETES MELLITUS WITH HYPERGLYCEMIA, WITHOUT LONG-TERM CURRENT USE OF INSULIN (HCC): ICD-10-CM

## 2022-11-18 RX ORDER — LANCETS 33 GAUGE
EACH MISCELLANEOUS
Qty: 300 EACH | Refills: 1 | Status: SHIPPED | OUTPATIENT
Start: 2022-11-18

## 2022-11-18 RX ORDER — BLOOD SUGAR DIAGNOSTIC
STRIP MISCELLANEOUS
Qty: 300 STRIP | Refills: 1 | Status: SHIPPED | OUTPATIENT
Start: 2022-11-18 | End: 2023-11-18

## 2022-11-18 NOTE — TELEPHONE ENCOUNTER
Patient notified and verbalized understanding. Advised to call office right away to let KE know if sugars start running high. Patient also requesting refill of metformin, one touch delica lancets and verio test strips      Per v/o KE ok to refill metformin x 3 months. 6 months for test strips and lancets.         preop note and KE clearance from this tel enc printed and faxed

## 2022-11-18 NOTE — TELEPHONE ENCOUNTER
Sugars are overall getting better. At least he is out of the 200's. I think it's reasonable to proceed with surgery. He needs to continue the current meds and monitor sugars very closely for the next 3-6 months while healing from surgery. If his sugars are averaging over 150's he is more likely to have infections.

## 2022-11-18 NOTE — TELEPHONE ENCOUNTER
See MyChart message from yesterday    Patient calling to follow up on his blood sugar readings    Patient is scheduled for surgery Tuesday    Please adv    Thank you

## 2022-11-19 LAB — AMB EXT HGBA1C: 7.9 %

## 2022-11-21 ENCOUNTER — TELEPHONE (OUTPATIENT)
Dept: FAMILY MEDICINE CLINIC | Facility: CLINIC | Age: 62
End: 2022-11-21

## 2022-11-21 NOTE — TELEPHONE ENCOUNTER
Yeimi Gu from [facility] calling to let KE know patient had labs done 11/19/22    A1c 7.9  Plt 86    KE notified and states still cleared for surgery    Deepali notified and verbalized understanding     A1c entered in external results

## 2022-11-28 ENCOUNTER — TELEPHONE (OUTPATIENT)
Dept: FAMILY MEDICINE CLINIC | Facility: CLINIC | Age: 62
End: 2022-11-28

## 2022-11-28 NOTE — TELEPHONE ENCOUNTER
Called and advised patient to contact surgeon as they manage postop care. They may need to see him to evaluate bleeding. Patient verbalized understanding.       stefan ESPOSITO

## 2022-11-28 NOTE — TELEPHONE ENCOUNTER
Pt states he is still having problems with nausea and incision is still bleeding a lot. Not taking anything for nausea. Not sure what to do. A little dry heaving. Pt wants advise on what to do. Thank you!

## 2023-01-09 PROCEDURE — 3044F HG A1C LEVEL LT 7.0%: CPT | Performed by: FAMILY MEDICINE

## 2023-01-12 ENCOUNTER — OFFICE VISIT (OUTPATIENT)
Dept: FAMILY MEDICINE CLINIC | Facility: CLINIC | Age: 63
End: 2023-01-12
Payer: COMMERCIAL

## 2023-01-12 VITALS
TEMPERATURE: 99 F | WEIGHT: 229 LBS | DIASTOLIC BLOOD PRESSURE: 68 MMHG | SYSTOLIC BLOOD PRESSURE: 108 MMHG | BODY MASS INDEX: 32.06 KG/M2 | RESPIRATION RATE: 16 BRPM | HEART RATE: 87 BPM | OXYGEN SATURATION: 97 % | HEIGHT: 71 IN

## 2023-01-12 DIAGNOSIS — H92.01 RIGHT EAR PAIN: Primary | ICD-10-CM

## 2023-01-12 DIAGNOSIS — Z96.612 HISTORY OF LEFT SHOULDER REPLACEMENT: ICD-10-CM

## 2023-01-12 DIAGNOSIS — E11.65 TYPE 2 DIABETES MELLITUS WITH HYPERGLYCEMIA, WITHOUT LONG-TERM CURRENT USE OF INSULIN (HCC): ICD-10-CM

## 2023-01-12 PROCEDURE — 3078F DIAST BP <80 MM HG: CPT | Performed by: FAMILY MEDICINE

## 2023-01-12 PROCEDURE — 3074F SYST BP LT 130 MM HG: CPT | Performed by: FAMILY MEDICINE

## 2023-01-12 PROCEDURE — 3008F BODY MASS INDEX DOCD: CPT | Performed by: FAMILY MEDICINE

## 2023-01-12 PROCEDURE — 99214 OFFICE O/P EST MOD 30 MIN: CPT | Performed by: FAMILY MEDICINE

## 2023-01-12 RX ORDER — RIVAROXABAN 10 MG/1
10 TABLET, FILM COATED ORAL DAILY
COMMUNITY
Start: 2023-01-05

## 2023-01-12 RX ORDER — GABAPENTIN 100 MG/1
100 CAPSULE ORAL 2 TIMES DAILY
COMMUNITY
Start: 2022-11-21

## 2023-01-12 RX ORDER — HYDROCODONE BITARTRATE AND ACETAMINOPHEN 10; 325 MG/1; MG/1
1-2 TABLET ORAL EVERY 4 HOURS PRN
COMMUNITY
Start: 2023-01-04 | End: 2023-01-12

## 2023-01-12 RX ORDER — HYDROCODONE BITARTRATE AND ACETAMINOPHEN 10; 325 MG/1; MG/1
1 TABLET ORAL EVERY 6 HOURS PRN
COMMUNITY

## 2023-01-12 RX ORDER — DIAZEPAM 5 MG/1
TABLET ORAL
COMMUNITY
Start: 2022-12-29

## 2023-01-30 DIAGNOSIS — E11.65 TYPE 2 DIABETES MELLITUS WITH HYPERGLYCEMIA, WITHOUT LONG-TERM CURRENT USE OF INSULIN (HCC): ICD-10-CM

## 2023-01-30 RX ORDER — SITAGLIPTIN 100 MG/1
TABLET, FILM COATED ORAL
Qty: 90 TABLET | Refills: 0 | Status: SHIPPED | OUTPATIENT
Start: 2023-01-30

## 2023-02-20 DIAGNOSIS — F32.1 CURRENT MODERATE EPISODE OF MAJOR DEPRESSIVE DISORDER WITHOUT PRIOR EPISODE (HCC): ICD-10-CM

## 2023-02-20 RX ORDER — DESVENLAFAXINE 50 MG/1
TABLET, EXTENDED RELEASE ORAL
Qty: 90 TABLET | Refills: 1 | Status: SHIPPED | OUTPATIENT
Start: 2023-02-20

## 2023-02-28 DIAGNOSIS — E11.9 NEW ONSET TYPE 2 DIABETES MELLITUS (HCC): ICD-10-CM

## 2023-02-28 NOTE — TELEPHONE ENCOUNTER
Pt failed refill protocol for the following reasons:   Diabetic Medication Protocol Failed 02/28/2023 08:02 AM   Protocol Details  Last HgBA1C < 7.5    Microalbumin procedure in past 12 months or taking ACE/ARB              Last refill: 12/07/2021 #30 with 5 refills  Last appt: 1/12/2023  Next appt: Future Appointments   Date Time Provider Alok Darling   4/14/2023  9:45 AM Tika Chery [facility] CYNDEE [place]         Forward to Dr. Paty Reynoso, please advise on refills. Thank you.

## 2023-03-09 ENCOUNTER — TELEPHONE (OUTPATIENT)
Dept: FAMILY MEDICINE CLINIC | Facility: CLINIC | Age: 63
End: 2023-03-09

## 2023-03-09 DIAGNOSIS — R26.89 BALANCE PROBLEM: Primary | ICD-10-CM

## 2023-03-09 DIAGNOSIS — R29.6 FREQUENT FALLS: ICD-10-CM

## 2023-03-09 NOTE — TELEPHONE ENCOUNTER
I can put in another PT order for balance issues and they can address that at the same time, if possible. In the mean time, use a cane when walking to avoid falls. We can have you see neurology in the future if needed to make sure there is nothing neurologically going on. Can you find out where he does PT and we can fax a new order over there?

## 2023-03-09 NOTE — TELEPHONE ENCOUNTER
Advised patient of Doctor's note below. Patient verbalized understanding. No further questions at this time. Please send PT referral order to [facility] PT in [facility] ([address])    Order(s) pending, please review. Thank you.     Please send back to nurse pool to fax PT referral order to [facility]  Thank you

## 2023-03-09 NOTE — TELEPHONE ENCOUNTER
PATIENT SAYS HIS BALANCE HAS GOTTEN PROGRESSIVELY WORSE THIS LAST MONTH. HE SAYS HE IS ALREADY SEEING P.T. FOR HIS SHOULDER. ASKING IF DR ESPOSITO RECOMMENDS SOME TYPE OF THERAPY FOR HIM SINCE HE IS ALREADY DOING P.T. OR WOULD SHE LIKE TO SEE HIM IN OFFICE FOR HIS BALANCE ISSUE.

## 2023-03-09 NOTE — TELEPHONE ENCOUNTER
LOV 01/12/23 with Dr. Oswald Other    Please review note below    Alejandra Carrillo for pt to see Physical Therapy for worsening balance? Pt reports already seeing PT for shoulder    Need to see pt in office?   Please advise, thank you

## 2023-03-10 NOTE — TELEPHONE ENCOUNTER
Physical Therapy referral order - faxed to [facility] PT Doyce Brandon at [facility]#[phone]    Left message to voicemail (per verbal release form consent, NO identifying message to confirm.)  Advised patient to call office back [phone] - need to advise of PT order faxed to [facility].

## 2023-03-17 ENCOUNTER — TELEPHONE (OUTPATIENT)
Dept: FAMILY MEDICINE CLINIC | Facility: CLINIC | Age: 63
End: 2023-03-17

## 2023-03-28 ENCOUNTER — TELEPHONE (OUTPATIENT)
Dept: FAMILY MEDICINE CLINIC | Facility: CLINIC | Age: 63
End: 2023-03-28

## 2023-04-06 ENCOUNTER — TELEPHONE (OUTPATIENT)
Dept: FAMILY MEDICINE CLINIC | Facility: CLINIC | Age: 63
End: 2023-04-06

## 2023-04-14 ENCOUNTER — OFFICE VISIT (OUTPATIENT)
Dept: FAMILY MEDICINE CLINIC | Facility: CLINIC | Age: 63
End: 2023-04-14
Payer: COMMERCIAL

## 2023-04-14 VITALS
OXYGEN SATURATION: 96 % | DIASTOLIC BLOOD PRESSURE: 86 MMHG | TEMPERATURE: 99 F | WEIGHT: 223 LBS | HEART RATE: 96 BPM | SYSTOLIC BLOOD PRESSURE: 120 MMHG | RESPIRATION RATE: 18 BRPM | BODY MASS INDEX: 31.92 KG/M2 | HEIGHT: 70 IN

## 2023-04-14 DIAGNOSIS — G60.9 IDIOPATHIC PERIPHERAL NEUROPATHY: ICD-10-CM

## 2023-04-14 DIAGNOSIS — Z12.5 SCREENING FOR MALIGNANT NEOPLASM OF PROSTATE: ICD-10-CM

## 2023-04-14 DIAGNOSIS — E11.65 TYPE 2 DIABETES MELLITUS WITH HYPERGLYCEMIA, WITHOUT LONG-TERM CURRENT USE OF INSULIN (HCC): ICD-10-CM

## 2023-04-14 DIAGNOSIS — Z12.11 SCREENING FOR MALIGNANT NEOPLASM OF COLON: ICD-10-CM

## 2023-04-14 DIAGNOSIS — Z00.00 HEALTHY ADULT ON ROUTINE PHYSICAL EXAMINATION: Primary | ICD-10-CM

## 2023-04-14 DIAGNOSIS — R30.0 DYSURIA: ICD-10-CM

## 2023-04-14 LAB
ALBUMIN SERPL-MCNC: 3.6 G/DL (ref 3.4–5)
ALBUMIN/GLOB SERPL: 1 {RATIO} (ref 1–2)
ALP LIVER SERPL-CCNC: 125 U/L
ALT SERPL-CCNC: 57 U/L
ANION GAP SERPL CALC-SCNC: 5 MMOL/L (ref 0–18)
AST SERPL-CCNC: 45 U/L (ref 15–37)
BASOPHILS # BLD AUTO: 0.02 X10(3) UL (ref 0–0.2)
BASOPHILS NFR BLD AUTO: 0.5 %
BILIRUB SERPL-MCNC: 0.9 MG/DL (ref 0.1–2)
BILIRUB UR QL STRIP.AUTO: NEGATIVE
BILIRUBIN: NEGATIVE
BUN BLD-MCNC: 10 MG/DL (ref 7–18)
CALCIUM BLD-MCNC: 9.6 MG/DL (ref 8.5–10.1)
CHLORIDE SERPL-SCNC: 109 MMOL/L (ref 98–112)
CHOLEST SERPL-MCNC: 148 MG/DL (ref ?–200)
CO2 SERPL-SCNC: 28 MMOL/L (ref 21–32)
COLOR UR AUTO: YELLOW
COMPLEXED PSA SERPL-MCNC: 3.86 NG/ML (ref ?–4)
CREAT BLD-MCNC: 0.98 MG/DL
CREAT UR-SCNC: 51.5 MG/DL
EOSINOPHIL # BLD AUTO: 0.11 X10(3) UL (ref 0–0.7)
EOSINOPHIL NFR BLD AUTO: 2.5 %
ERYTHROCYTE [DISTWIDTH] IN BLOOD BY AUTOMATED COUNT: 14.6 %
EST. AVERAGE GLUCOSE BLD GHB EST-MCNC: 146 MG/DL (ref 68–126)
FASTING PATIENT LIPID ANSWER: NO
FASTING STATUS PATIENT QL REPORTED: NO
GFR SERPLBLD BASED ON 1.73 SQ M-ARVRAT: 87 ML/MIN/1.73M2 (ref 60–?)
GLOBULIN PLAS-MCNC: 3.6 G/DL (ref 2.8–4.4)
GLUCOSE (URINE DIPSTICK): 500 MG/DL
GLUCOSE BLD-MCNC: 196 MG/DL (ref 70–99)
GLUCOSE UR STRIP.AUTO-MCNC: >=500 MG/DL
HBA1C MFR BLD: 6.7 % (ref ?–5.7)
HCT VFR BLD AUTO: 42.4 %
HDLC SERPL-MCNC: 47 MG/DL (ref 40–59)
HGB BLD-MCNC: 14.1 G/DL
IMM GRANULOCYTES # BLD AUTO: 0.02 X10(3) UL (ref 0–1)
IMM GRANULOCYTES NFR BLD: 0.5 %
KETONES (URINE DIPSTICK): NEGATIVE MG/DL
KETONES UR STRIP.AUTO-MCNC: NEGATIVE MG/DL
LDLC SERPL CALC-MCNC: 85 MG/DL (ref ?–100)
LEUKOCYTES: NEGATIVE
LYMPHOCYTES # BLD AUTO: 1.56 X10(3) UL (ref 1–4)
LYMPHOCYTES NFR BLD AUTO: 35.8 %
MCH RBC QN AUTO: 29.6 PG (ref 26–34)
MCHC RBC AUTO-ENTMCNC: 33.3 G/DL (ref 31–37)
MCV RBC AUTO: 89.1 FL
MICROALBUMIN UR-MCNC: 2.1 MG/DL
MICROALBUMIN/CREAT 24H UR-RTO: 40.8 UG/MG (ref ?–30)
MONOCYTES # BLD AUTO: 0.41 X10(3) UL (ref 0.1–1)
MONOCYTES NFR BLD AUTO: 9.4 %
MULTISTIX LOT#: ABNORMAL NUMERIC
NEUTROPHILS # BLD AUTO: 2.24 X10 (3) UL (ref 1.5–7.7)
NEUTROPHILS # BLD AUTO: 2.24 X10(3) UL (ref 1.5–7.7)
NEUTROPHILS NFR BLD AUTO: 51.3 %
NITRITE UR QL STRIP.AUTO: NEGATIVE
NITRITE, URINE: NEGATIVE
NONHDLC SERPL-MCNC: 101 MG/DL (ref ?–130)
OSMOLALITY SERPL CALC.SUM OF ELEC: 298 MOSM/KG (ref 275–295)
PH UR STRIP.AUTO: 6 [PH] (ref 5–8)
PH, URINE: 6 (ref 4.5–8)
PLATELET # BLD AUTO: 79 10(3)UL (ref 150–450)
POTASSIUM SERPL-SCNC: 3.6 MMOL/L (ref 3.5–5.1)
PROT SERPL-MCNC: 7.2 G/DL (ref 6.4–8.2)
PROT UR STRIP.AUTO-MCNC: NEGATIVE MG/DL
RBC # BLD AUTO: 4.76 X10(6)UL
RBC UR QL AUTO: NEGATIVE
SODIUM SERPL-SCNC: 142 MMOL/L (ref 136–145)
SP GR UR STRIP.AUTO: 1.02 (ref 1–1.03)
SPECIFIC GRAVITY: 1.01 (ref 1–1.03)
TRIGL SERPL-MCNC: 86 MG/DL (ref 30–149)
TSI SER-ACNC: 0.78 MIU/ML (ref 0.36–3.74)
URINE-COLOR: YELLOW
UROBILINOGEN UR STRIP.AUTO-MCNC: <2 MG/DL
UROBILINOGEN,SEMI-QN: 0.2 MG/DL (ref 0–1.9)
VLDLC SERPL CALC-MCNC: 14 MG/DL (ref 0–30)
WBC # BLD AUTO: 4.4 X10(3) UL (ref 4–11)
WBC CLUMPS UR QL AUTO: PRESENT /HPF

## 2023-04-14 PROCEDURE — 84153 ASSAY OF PSA TOTAL: CPT | Performed by: FAMILY MEDICINE

## 2023-04-14 PROCEDURE — 82043 UR ALBUMIN QUANTITATIVE: CPT | Performed by: FAMILY MEDICINE

## 2023-04-14 PROCEDURE — 99396 PREV VISIT EST AGE 40-64: CPT | Performed by: FAMILY MEDICINE

## 2023-04-14 PROCEDURE — 82570 ASSAY OF URINE CREATININE: CPT | Performed by: FAMILY MEDICINE

## 2023-04-14 PROCEDURE — 80050 GENERAL HEALTH PANEL: CPT | Performed by: FAMILY MEDICINE

## 2023-04-14 PROCEDURE — 3079F DIAST BP 80-89 MM HG: CPT | Performed by: FAMILY MEDICINE

## 2023-04-14 PROCEDURE — 81003 URINALYSIS AUTO W/O SCOPE: CPT | Performed by: FAMILY MEDICINE

## 2023-04-14 PROCEDURE — 81001 URINALYSIS AUTO W/SCOPE: CPT | Performed by: FAMILY MEDICINE

## 2023-04-14 PROCEDURE — 3074F SYST BP LT 130 MM HG: CPT | Performed by: FAMILY MEDICINE

## 2023-04-14 PROCEDURE — 87086 URINE CULTURE/COLONY COUNT: CPT | Performed by: FAMILY MEDICINE

## 2023-04-14 PROCEDURE — 83036 HEMOGLOBIN GLYCOSYLATED A1C: CPT | Performed by: FAMILY MEDICINE

## 2023-04-14 PROCEDURE — 3008F BODY MASS INDEX DOCD: CPT | Performed by: FAMILY MEDICINE

## 2023-04-14 PROCEDURE — 80061 LIPID PANEL: CPT | Performed by: FAMILY MEDICINE

## 2023-04-17 ENCOUNTER — TELEPHONE (OUTPATIENT)
Dept: FAMILY MEDICINE CLINIC | Facility: CLINIC | Age: 63
End: 2023-04-17

## 2023-04-17 DIAGNOSIS — R30.0 DYSURIA: Primary | ICD-10-CM

## 2023-04-17 RX ORDER — CIPROFLOXACIN 250 MG/1
250 TABLET, FILM COATED ORAL 2 TIMES DAILY
Qty: 6 TABLET | Refills: 0 | Status: SHIPPED | OUTPATIENT
Start: 2023-04-17 | End: 2023-04-20

## 2023-04-17 NOTE — TELEPHONE ENCOUNTER
I sent in a script for 3 days of cipro. That should treat a mild infection. Cut back on caffiene, carbonated beverages, spicy foods and sugary foods. Those can irritate the bladder.

## 2023-04-17 NOTE — TELEPHONE ENCOUNTER
Patient notified of test results. Patient states he is having pain every time he urinates.    Would like to know if he should take antibiotic or other recs

## 2023-06-12 ENCOUNTER — TELEPHONE (OUTPATIENT)
Dept: FAMILY MEDICINE CLINIC | Facility: CLINIC | Age: 63
End: 2023-06-12

## 2023-06-12 NOTE — TELEPHONE ENCOUNTER
Letter mailed to patient reminding them they have outstanding orders.        Lab Frequency Next Occurrence   OCCULT BLOOD, FECAL, FIT IMMUNOASSAY Once 04/14/2023

## 2023-11-14 ENCOUNTER — OFFICE VISIT (OUTPATIENT)
Dept: FAMILY MEDICINE CLINIC | Facility: CLINIC | Age: 63
End: 2023-11-14
Payer: COMMERCIAL

## 2023-11-14 ENCOUNTER — TELEPHONE (OUTPATIENT)
Dept: FAMILY MEDICINE CLINIC | Facility: CLINIC | Age: 63
End: 2023-11-14

## 2023-11-14 VITALS
OXYGEN SATURATION: 97 % | BODY MASS INDEX: 32 KG/M2 | RESPIRATION RATE: 18 BRPM | TEMPERATURE: 98 F | WEIGHT: 220.25 LBS | HEART RATE: 87 BPM | SYSTOLIC BLOOD PRESSURE: 130 MMHG | DIASTOLIC BLOOD PRESSURE: 70 MMHG

## 2023-11-14 DIAGNOSIS — R05.2 SUBACUTE COUGH: Primary | ICD-10-CM

## 2023-11-14 RX ORDER — CODEINE PHOSPHATE AND GUAIFENESIN 10; 100 MG/5ML; MG/5ML
5 SOLUTION ORAL 3 TIMES DAILY PRN
Qty: 180 ML | Refills: 0 | Status: SHIPPED | OUTPATIENT
Start: 2023-11-14

## 2023-11-14 RX ORDER — AZITHROMYCIN 250 MG/1
TABLET, FILM COATED ORAL
Qty: 6 TABLET | Refills: 0 | Status: SHIPPED | OUTPATIENT
Start: 2023-11-14 | End: 2023-11-18

## 2023-11-14 RX ORDER — METFORMIN HYDROCHLORIDE EXTENDED-RELEASE TABLETS 500 MG/1
500 TABLET, FILM COATED, EXTENDED RELEASE ORAL
COMMUNITY

## 2023-11-14 NOTE — TELEPHONE ENCOUNTER
Patient states cough for the last 5 days  Stomach muscles sore and gets headache from coughing. No fever, congestion, wheezing or SOB with cough  No chest pain.     Future Appointments   Date Time Provider Alok Darling   11/14/2023  2:00 PM Richi Zimmerman [facility]

## 2023-11-27 DIAGNOSIS — R05.2 SUBACUTE COUGH: ICD-10-CM

## 2023-11-27 RX ORDER — CODEINE PHOSPHATE AND GUAIFENESIN 10; 100 MG/5ML; MG/5ML
5 SOLUTION ORAL 3 TIMES DAILY PRN
Qty: 180 ML | Refills: 0 | Status: SHIPPED | OUTPATIENT
Start: 2023-11-27

## 2023-11-27 NOTE — TELEPHONE ENCOUNTER
Pt wife states pt spilled a majority of cough syrup w/ codeine a while back, was rationing out medication until now. Asking for refill on cough syrup if possible.      LOV: 11/14/23

## 2023-12-28 ENCOUNTER — OFFICE VISIT (OUTPATIENT)
Dept: FAMILY MEDICINE CLINIC | Facility: CLINIC | Age: 63
End: 2023-12-28
Payer: COMMERCIAL

## 2023-12-28 VITALS
OXYGEN SATURATION: 99 % | BODY MASS INDEX: 32 KG/M2 | HEART RATE: 87 BPM | RESPIRATION RATE: 20 BRPM | SYSTOLIC BLOOD PRESSURE: 132 MMHG | DIASTOLIC BLOOD PRESSURE: 70 MMHG | TEMPERATURE: 99 F | WEIGHT: 223.13 LBS

## 2023-12-28 DIAGNOSIS — E11.65 TYPE 2 DIABETES MELLITUS WITH HYPERGLYCEMIA, WITHOUT LONG-TERM CURRENT USE OF INSULIN (HCC): ICD-10-CM

## 2023-12-28 DIAGNOSIS — R05.2 SUBACUTE COUGH: Primary | ICD-10-CM

## 2023-12-28 DIAGNOSIS — R07.89 FEELING OF CHEST TIGHTNESS: ICD-10-CM

## 2023-12-28 PROCEDURE — 3078F DIAST BP <80 MM HG: CPT | Performed by: FAMILY MEDICINE

## 2023-12-28 PROCEDURE — 3075F SYST BP GE 130 - 139MM HG: CPT | Performed by: FAMILY MEDICINE

## 2023-12-28 PROCEDURE — 99214 OFFICE O/P EST MOD 30 MIN: CPT | Performed by: FAMILY MEDICINE

## 2023-12-28 RX ORDER — AZITHROMYCIN 250 MG/1
TABLET, FILM COATED ORAL
Qty: 6 TABLET | Refills: 0 | Status: SHIPPED | OUTPATIENT
Start: 2023-12-28 | End: 2024-01-01

## 2023-12-28 RX ORDER — ALBUTEROL SULFATE 90 UG/1
2 AEROSOL, METERED RESPIRATORY (INHALATION) EVERY 6 HOURS PRN
Qty: 1 EACH | Refills: 3 | Status: SHIPPED | OUTPATIENT
Start: 2023-12-28

## 2023-12-28 RX ORDER — CODEINE PHOSPHATE AND GUAIFENESIN 10; 100 MG/5ML; MG/5ML
5 SOLUTION ORAL 3 TIMES DAILY PRN
Qty: 180 ML | Refills: 0 | Status: SHIPPED | OUTPATIENT
Start: 2023-12-28

## 2024-01-11 ENCOUNTER — TELEPHONE (OUTPATIENT)
Dept: FAMILY MEDICINE CLINIC | Facility: CLINIC | Age: 64
End: 2024-01-11

## 2024-01-11 DIAGNOSIS — R05.2 SUBACUTE COUGH: Primary | ICD-10-CM

## 2024-01-11 RX ORDER — AZITHROMYCIN 250 MG/1
TABLET, FILM COATED ORAL
Qty: 6 TABLET | Refills: 0 | Status: SHIPPED | OUTPATIENT
Start: 2024-01-11 | End: 2024-01-15

## 2024-01-11 NOTE — TELEPHONE ENCOUNTER
Spoke with patient who states cough got better for about 5 days.  States \"yesterday it came roaring back\"  No wheezing, no sob  Not coughing anything up  Worse at night    No sinus drainage or other symptoms

## 2024-01-11 NOTE — TELEPHONE ENCOUNTER
Pt states cough didn't go away or get better.  Finished z-linh (1.5 weeks ago) and cough medicine (still taking but not helping).  Was doing great up until yesterday and now cough is back.  What is next step?  Please adivse.  Thank you!

## 2024-01-11 NOTE — TELEPHONE ENCOUNTER
Lets do one more zpack and hopefully that will do the trick if it's nto getting better with that, then see me in office again.

## 2024-01-19 NOTE — TELEPHONE ENCOUNTER
Reports he was treated in the past     - hepatitis panel   That is up to him if he wants another opinion or not. If you want to go ahead with surgery, it may be a good idea to get another opinion before more surgery. In that case, I would recommend [facility]i.

## 2024-02-28 DIAGNOSIS — F32.1 CURRENT MODERATE EPISODE OF MAJOR DEPRESSIVE DISORDER WITHOUT PRIOR EPISODE (HCC): ICD-10-CM

## 2024-02-28 RX ORDER — DESVENLAFAXINE SUCCINATE 50 MG/1
50 TABLET, EXTENDED RELEASE ORAL DAILY
Qty: 90 TABLET | Refills: 1 | Status: SHIPPED | OUTPATIENT
Start: 2024-02-28

## 2024-02-28 NOTE — TELEPHONE ENCOUNTER
Psychiatric Non-Scheduled (Anti-Anxiety) [id number]/28/2024 01:58 PM   Protocol Details In person appointment or virtual visit in the past 6 mos or appointment in next 3 mos    Depression Screening completed within the past 12 months   Refilled per protocol  DESVENLAFAXINE ER 50 MG Oral Tablet 24 Hr   Last refilled on 2/20/23 #90 with 1 rf.  LOV- 12/28/23  Last labs- 4/14/23    Sent to pharmacy
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
## 2024-04-08 ENCOUNTER — MED REC SCAN ONLY (OUTPATIENT)
Dept: FAMILY MEDICINE CLINIC | Facility: CLINIC | Age: 64
End: 2024-04-08

## 2024-06-15 ENCOUNTER — APPOINTMENT (OUTPATIENT)
Dept: GENERAL RADIOLOGY | Facility: HOSPITAL | Age: 64
End: 2024-06-15

## 2024-06-15 ENCOUNTER — HOSPITAL ENCOUNTER (EMERGENCY)
Facility: HOSPITAL | Age: 64
Discharge: HOME | End: 2024-06-15
Attending: EMERGENCY MEDICINE

## 2024-06-15 VITALS
SYSTOLIC BLOOD PRESSURE: 129 MMHG | BODY MASS INDEX: 28.7 KG/M2 | OXYGEN SATURATION: 100 % | WEIGHT: 205 LBS | HEART RATE: 89 BPM | TEMPERATURE: 98 F | DIASTOLIC BLOOD PRESSURE: 79 MMHG | RESPIRATION RATE: 18 BRPM | HEIGHT: 71 IN

## 2024-06-15 DIAGNOSIS — S61.451A DOG BITE, HAND, RIGHT, INITIAL ENCOUNTER: ICD-10-CM

## 2024-06-15 DIAGNOSIS — S61.411A LACERATION OF RIGHT HAND WITHOUT FOREIGN BODY, INITIAL ENCOUNTER: Primary | ICD-10-CM
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
## 2024-06-15 PROCEDURE — 99284 EMERGENCY DEPT VISIT MOD MDM: CPT

## 2024-06-15 PROCEDURE — 90471 IMMUNIZATION ADMIN: CPT

## 2024-06-15 PROCEDURE — 12002 RPR S/N/AX/GEN/TRNK2.6-7.5CM: CPT

## 2024-06-15 PROCEDURE — 73130 X-RAY EXAM OF HAND: CPT

## 2024-06-15 PROCEDURE — 99283 EMERGENCY DEPT VISIT LOW MDM: CPT

## 2024-06-15 RX ORDER — DOXYCYCLINE HYCLATE 100 MG/1
100 CAPSULE ORAL 2 TIMES DAILY
Qty: 14 CAPSULE | Refills: 0 | Status: SHIPPED | OUTPATIENT
Start: 2024-06-15 | End: 2024-06-22

## 2024-06-15 RX ORDER — METRONIDAZOLE 500 MG/1
500 TABLET ORAL 3 TIMES DAILY
Qty: 21 TABLET | Refills: 0 | Status: SHIPPED | OUTPATIENT
Start: 2024-06-15 | End: 2024-06-22

## 2024-06-15 RX ORDER — METRONIDAZOLE 500 MG/1
500 TABLET ORAL ONCE
Status: COMPLETED | OUTPATIENT
Start: 2024-06-15 | End: 2024-06-15

## 2024-06-15 RX ORDER — DOXYCYCLINE HYCLATE 100 MG/1
100 CAPSULE ORAL EVERY 12 HOURS SCHEDULED
Status: DISCONTINUED | OUTPATIENT
Start: 2024-06-15 | End: 2024-06-15

## 2024-06-15 NOTE — ED PROVIDER NOTES
Patient Seen in: [facility] Emergency Department      History     Chief Complaint   Patient presents with    Arm or Hand Injury    Trauma    Bite     Stated Complaint: right thumb laceration. not on blood thinners    Subjective:   HPI    63-year-old male presents with dog bite to his right hand with laceration between his thumb and index finger webspace occurring about 3 hours prior to arrival.  States somebody was walking there people next to him who let go of the lesion and bit his hand.  Status unknown.  Reports some numbness to his thumb but denies any difficulty moving it.  Denies foreign body sensation.    Objective:   Past Medical History:    Acute bronchitis    Acute respiratory failure due to COVID-19 ([facility])    Anesthesia complication    agitated upon waking with  cervical fusion    Back problem    cervical surgery    Bowel obstruction ([facility])    Calculus of kidney    Cellulitis    Conjunctivitis    COVID-19 virus infection    Deep vein thrombosis ([facility])    left arm, after bowel obstruction, medicine related per patient    Depression    Diabetes ([facility])    Neuropathy    \"whole body\" per patient    Osteoarthritis    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 Benítez    total shoulder arthroplasty    Appendectomy      rupture    Cholecystectomy  10/2003    Forearm/wrist surgery unlisted Bilateral 1999    carpal tunnel R wrist, l wrist    Leg/ankle surgery proc unlisted      fx R leg    Other surgical history      exploratory laparotomy    Other surgical history      partial colectomy    Spine surgery procedure unlisted  3/2011    cervical vertebral  fusion    Total knee replacement Right     Vasectomy  1999                Social History     Socioeconomic History    Marital status:    Occupational History    Occupation: Disabled   Tobacco Use    Smoking status: Never    Smokeless tobacco: Never   Vaping Use    Vaping status: Never Used   Substance and Sexual Activity    Alcohol use: Not Currently     Alcohol/week: 0.0 standard drinks of alcohol     Comment: 10 drinks per year    Drug use: No   Other Topics Concern    Caffeine Concern Yes     Comment: 1 liter of soda daily     Exercise Yes     Comment: PT 3 times per week    Seat Belt Yes     Social Determinants of Health     Food Insecurity: Low Risk  (1/9/2023)    Received from [facility]    Food Insecurity     Have there been times that your food ran out, and you didn't have money to get more?: No     Are there times that you worry that this might happen?: No   Transportation Needs: Low Risk  (1/9/2023)    Received from [facility]    Transportation Needs     Do you have trouble getting transportation to medical appointments?: No    [facility]    Social Connections   Housing Stability:   Recent Concern: Housing Stability - High Risk (1/9/2023)    Received from [facility]    Housing Stability     Are you concerned about having a safe and reliable place to live?: Yes              Review of Systems    Positive for stated complaint: right thumb laceration. not on blood thinners  Other systems are as noted in HPI.  Constitutional and vital signs reviewed.      All other systems reviewed and negative except as noted above.    Physical Exam     ED Triage Vitals [06/15/24 1252]   /75   Pulse 80   Resp 16   Temp 98.1 °F (36.7 °C)   Temp src Oral   SpO2 95 %   O2 Device None  (Room air)       Current Vitals:   Vital Signs  BP: 129/79  Pulse: 89  Resp: 18  Temp: 98.1 °F (36.7 °C)  Temp src: Oral  MAP (mmHg): 89    Oxygen Therapy  SpO2: 100 %  O2 Device: None (Room air)            Physical Exam  Vitals and nursing note reviewed.   Constitutional:       Appearance: Normal appearance. Christian is normal weight.   HENT:      Head: Normocephalic and atraumatic.   Musculoskeletal:      Comments: Right right hand with irregular 5 cm laceration along the webspace between the thumb and index finger   No tendons visible  Right thumb and index finger with no active abduction/adduction/flexion/extension  Right thumb with sensation intact slightly decreased on the ulnar aspect     Skin:     General: Skin is warm and dry.   Neurological:      Mental Status: Christian is alert and oriented to person, place, and time.   Psychiatric:         Mood and Affect: Mood normal.         Behavior: Behavior normal.               ED Course   Labs Reviewed - No data to display          XR HAND (MIN 3 VIEWS), RIGHT (CPT=73130)    Result Date: 6/15/2024  CONCLUSION:   No acute fracture or malalignment.  Moderate osteoarthritis of the 2nd and 3rd MCP joints and scattered interphalangeal joints.  No periarticular erosions.  Bandage material noted about the thenar eminence.  No retained foreign body identified.   LOCATION:  [facility]   Dictated by (CST): Alexis Saenz MD on 6/15/2024 at 1:26 PM     Finalized by (CST): Alexis Saenz MD on 6/15/2024 at 1:28 PM             Procedure note: Laceration repair  Laceration was anesthetized with lidocaine locally.  The wound was cleansed and irrigated copiously.  There was no visible foreign body, vessel, nerve, bone , or tendon within the wound. The wound was closed using a simple closure with 5-0 nylon.  The quality of the closure was excellent    MDM   63-year-old male presents with dog bite to his right hand with laceration between his thumb and index finger webspace occurring about 3 hours  prior to arrival.    Differential includes but is not limited to dog bite laceration, unlikely foreign body, fracture or tendon injury given patient is full active range of motion    Independent interpretation of hand x-rays show no evidence of fracture or foreign body.  Radiology report reviewed as above.    Laceration was closed with 5-0 nylon x 7.  Tetanus was updated.  Patient was treated with Rx for doxycycline and metronidazole due to penicillin and sulfa allergy.  Instructed to have wound check in 2 days and suture removal in 10 to 14 days.  Return precaution discussed.    Medical Decision Making  Amount and/or Complexity of Data Reviewed  Radiology: ordered and independent interpretation performed. Decision-making details documented in ED Course.    Risk  Prescription drug management.        Disposition and Plan     Clinical Impression:  1. Laceration of right hand without foreign body, initial encounter    2. Dog bite, hand, right, initial encounter         Disposition:  Discharge  6/15/2024  4:16 pm    Follow-up:  Genesis Will, DO  [address] [facility] [address]  [phone]    Schedule an appointment as soon as possible for a visit in 2 day(s)            Medications Prescribed:  Discharge Medication List as of 6/15/2024  4:22 PM        START taking these medications    Details   doxycycline 100 MG Oral Cap Take 1 capsule (100 mg total) by mouth 2 (two) times daily for 7 days., Normal, Disp-14 capsule, R-0      metRONIDAZOLE 500 MG Oral Tab Take 1 tablet (500 mg total) by mouth 3 (three) times daily for 7 days., Normal, Disp-21 tablet, R-0

## 2024-06-15 NOTE — ED INITIAL ASSESSMENT (HPI)
Patient here for a dog bite around 1115 today. Patient was on a walk when a strangers pitbull attacked him, open wound to the right hand webbing between thumb and pointer. Bleeding is controlled at this time. No information on the dog.

## 2024-06-15 NOTE — ED QUICK NOTES
Pt left his wallet in the room after Discharge; PCT Josefina bagged it in a small security bag and brought to from Triage Security desk for pt to   This RN tried calling pt number on file but no answer and no ability to leave message  [phone] (Mobile)

## 2024-06-17 ENCOUNTER — TELEPHONE (OUTPATIENT)
Dept: FAMILY MEDICINE CLINIC | Facility: CLINIC | Age: 64
End: 2024-06-17

## 2024-06-17 ENCOUNTER — TELEPHONE (OUTPATIENT)
Dept: ORTHOPEDICS CLINIC | Facility: CLINIC | Age: 64
End: 2024-06-17

## 2024-06-17 ENCOUNTER — OFFICE VISIT (OUTPATIENT)
Dept: FAMILY MEDICINE CLINIC | Facility: CLINIC | Age: 64
End: 2024-06-17
Payer: COMMERCIAL

## 2024-06-17 VITALS
BODY MASS INDEX: 29 KG/M2 | OXYGEN SATURATION: 98 % | HEART RATE: 87 BPM | SYSTOLIC BLOOD PRESSURE: 130 MMHG | RESPIRATION RATE: 18 BRPM | WEIGHT: 211 LBS | TEMPERATURE: 99 F | DIASTOLIC BLOOD PRESSURE: 80 MMHG
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
## 2024-06-17 DIAGNOSIS — S61.451A DOG BITE OF RIGHT HAND, INITIAL ENCOUNTER: Primary | ICD-10-CM

## 2024-06-17 DIAGNOSIS — S61.411A LACERATION OF RIGHT HAND WITHOUT FOREIGN BODY, INITIAL ENCOUNTER: ICD-10-CM

## 2024-06-17 PROCEDURE — 99214 OFFICE O/P EST MOD 30 MIN: CPT | Performed by: FAMILY MEDICINE

## 2024-06-17 PROCEDURE — 3079F DIAST BP 80-89 MM HG: CPT | Performed by: FAMILY MEDICINE

## 2024-06-17 PROCEDURE — 3075F SYST BP GE 130 - 139MM HG: CPT | Performed by: FAMILY MEDICINE

## 2024-06-17 NOTE — TELEPHONE ENCOUNTER
Patient has a laceration from a dog bite. Please advise when patient can be seen. Has sutures. Imaging is in epic, do you want new imaging?

## 2024-06-17 NOTE — TELEPHONE ENCOUNTER
Patient was seen in ER Saturday (6/15) for dog bite to hand    Sutures placed    All but two of the sutures have come out    The wound is not actively bleeding but is \"oozing\" according to patient    Come in for eval or back to ER?    Please adv  Thank you

## 2024-06-17 NOTE — PROGRESS NOTES
Smith Roman is a 63 year old adult.  Chief Complaint   Patient presents with    Bite     Dog bite       HPI:   Bit by a bit bull Saturday mid day. He did not know the dog. The owner was with the dog. Vaccination status of dog is unknown.   He went to ER. He was given Tdap.   They put him on abx. Doxycycline and metronidazole.   They put stitches in it.   Ones down int he webbing pulled out or are coming out.   It is oozing, but not put, little blood.   Pain is not terrible.   Only mild swelling.     ALLERGIES:  Allergies   Allergen Reactions    Penicillins HIVES    Tramadol NAUSEA ONLY     Head ache    Sulfa Antibiotics HIVES    Eggs Or Egg-Derived Products DIARRHEA    Hydrocodone-Acetaminophen NAUSEA ONLY     Headaches  Patient can take generic Norco without any reactions  Headaches  Patient can take generic Norco without any reactions  Headaches  Patient can take generic Norco without any reactions         Current Outpatient Medications   Medication Sig Dispense Refill    metRONIDAZOLE 500 MG Oral Tab Take 1 tablet (500 mg total) by mouth 3 (three) times daily for 7 days. 21 tablet 0    desvenlafaxine ER 50 MG Oral Tablet 24 Hr Take 1 tablet (50 mg total) by mouth daily. 90 tablet 1    albuterol 108 (90 Base) MCG/ACT Inhalation Aero Soln Inhale 2 puffs into the lungs every 6 (six) hours as needed for Shortness of Breath. 1 each 3    metFORMIN HCl ER, OSM, 500 MG (OSM) Oral Tablet 24 Hr Take 1 tablet (500 mg total) by mouth daily with breakfast.      empagliflozin (JARDIANCE) 10 MG Oral Tab Take 1 tablet (10 mg total) by mouth daily. 30 tablet 2    OneTouch Delica Lancets 33G Does not apply Misc Use to test blood sugar three times daily as directed. Dx E11.65 300 each 1    doxycycline 100 MG Oral Cap Take 1 capsule (100 mg total) by mouth 2 (two) times daily for 7 days. (Patient not taking: Reported on 6/17/2024) 14 capsule 0    guaiFENesin-codeine 100-10 MG/5ML Oral Solution Take 5 mL by mouth 3 (three) times  daily as needed for cough. (Patient not taking: Reported on 6/17/2024) 180 mL 0    JANUVIA 100 MG Oral Tab TAKE 1 TABLET(100 MG) BY MOUTH IN THE MORNING (Patient not taking: Reported on 6/17/2024) 90 tablet 0    HYDROcodone-acetaminophen  MG Oral Tab Take 1 tablet by mouth every 6 (six) hours as needed for Pain. (Patient not taking: Reported on 12/28/2023)        Past Medical History:    Acute bronchitis    Acute respiratory failure due to COVID-19 ([facility])    Anesthesia complication    agitated upon waking with  cervical fusion    Back problem    cervical surgery    Bowel obstruction ([facility])    Calculus of kidney    Cellulitis    Conjunctivitis    COVID-19 virus infection    Deep vein thrombosis ([facility])    left arm, after bowel obstruction, medicine related per patient    Depression    Diabetes ([facility])    Neuropathy    \"whole body\" per patient    Osteoarthritis    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[facility])    left    Pre-operative cardiovascular examination    Preoperative examination, unspecified    Prosthetic joint infection, initial encounter ([facility])    Sleep apnea    Tinea pedis    Vertigo    Visual impairment    glassses      Social History:  Social History     Socioeconomic History    Marital status:    Occupational History    Occupation: Disabled   Tobacco Use    Smoking status: Never    Smokeless tobacco: Never   Vaping Use    Vaping status: Never Used   Substance and Sexual Activity    Alcohol use: Not Currently     Alcohol/week: 0.0 standard drinks of alcohol     Comment: 10 drinks per year    Drug use: No   Other Topics Concern    Caffeine Concern Yes     Comment: 1 liter of soda daily     Exercise Yes     Comment: PT 3 times per week    Seat Belt Yes     Social Determinants of Health     Food Insecurity: Low Risk  (1/9/2023)    [facility]    Food Insecurity     Have there been times that your food ran  out, and you didn't have money to get more?: No     Are there times that you worry that this might happen?: No   Transportation Needs: Low Risk  (1/9/2023)    Received from [facility]    Transportation Needs     Do you have trouble getting transportation to medical appointments?: No    [facility]    Social Connections   Housing Stability:   Recent Concern: Housing Stability - High Risk (1/9/2023)    [facility]    Housing Stability     Are you concerned about having a safe and reliable place to live?: Yes        BP Readings from Last 6 Encounters:   06/17/24 130/80   06/15/24 129/79   12/28/23 132/70   11/14/23 130/70   04/14/23 120/86   01/12/23 108/68       Wt Readings from Last 6 Encounters:   06/17/24 211 lb (95.7 kg)   06/15/24 205 lb (93 kg)   12/28/23 223 lb 2 oz (101.2 kg)   11/14/23 220 lb 4 oz (99.9 kg)   04/14/23 223 lb (101.2 kg)   01/12/23 229 lb (103.9 kg)       REVIEW OF SYSTEMS:   GENERAL HEALTH: feels well no complaints other than above   SKIN: denies any unusual skin lesions or rashes  RESPIRATORY: denies shortness of breath    CARDIOVASCULAR: denies chest pain    GI: denies abdominal pain and denies heartburn  NEURO: denies headaches    EXAM:   /80   Pulse 87   Temp 98.5 °F (36.9 °C) (Temporal)   Resp 18   Wt 211 lb (95.7 kg)   SpO2 98%   BMI 29.43 kg/m²  Body mass index is 29.43 kg/m².      GENERAL: well developed, well nourished,in no apparent distress  SKIN: no rashes,no suspicious lesions  HEENT: atraumatic, normocephalic,   NECK: supple,no adenopathy   LUNGS: clear to auscultation  CARDIO: RRR without murmur  GI: good BS's,no masses, HSM or tenderness  EXTREMITIES: no cyanosis, clubbing or edema  Musc: laceration in the right hand between thumb and first finger with sutures in place, some have  pulled out. Overlying skin is white, but not easily sloughing off. No redness, drainage or significant swelling.     ASSESSMENT AND PLAN:     Encounter Diagnoses   Name Primary?    Dog bite of right hand, initial encounter Yes    Laceration of right hand without foreign body, initial encounter        Diagnoses and all orders for this visit:    Dog bite of right hand, initial encounter  -     Ortho Referral - In Network    Laceration of right hand without foreign body, initial encounter  -     Ortho Referral - In Network    Will leave it how it is for now and have him see ortho for help with wound care and laceration care of this delicate area.   If worsening redness, pain, drainage, then go to ER.     No orders of the defined types were placed in this encounter.              Meds & Refills for this Visit:  Requested Prescriptions      No prescriptions requested or ordered in this encounter             The patient indicates understanding of these issues and agrees to the plan.

## 2024-06-17 NOTE — TELEPHONE ENCOUNTER
Patient was seen in ED on 6/15 for a right hand injury. States his stitches are starting to come out. Please advise if and when he can be seen.

## 2024-06-18 ENCOUNTER — TELEPHONE (OUTPATIENT)
Dept: FAMILY MEDICINE CLINIC | Facility: CLINIC | Age: 64
End: 2024-06-18

## 2024-06-18 NOTE — TELEPHONE ENCOUNTER
I think that's okay to wait another 3 days. If it starts looking worse, then let me know. Otherwise, keep it clean and don't pull at it.

## 2024-06-18 NOTE — TELEPHONE ENCOUNTER
Scheduled patient for New patient visit with Dr. Kumar this Friday 6/21 at 11:00 am. Xrays in [facility]. Advised [place] location.

## 2024-06-18 NOTE — TELEPHONE ENCOUNTER
PATIENT IS CALLING THIS MORNING WITH A QUESTION FOR DR ESPOSITO.  PATIENT SAYS HE HAD AN APPOINTMENT WITH DR ESPOSITO DUE TO A DOG BITE.  DR ESPOSITO REFERRED PATIENT TO A HAND SPECIALIST.  PATIENT SAYS HE IS SCHEDULED FOR THIS FRIDAY WITH DR MEHTA (HAND SPECIALIST)  DOES DR ESPOSITO THINK THIS IS OK OR TOO FAR OUT, ESPECIALLY NOW THAT THE STITCHES ARE COMING LOOSE.

## 2024-06-21 ENCOUNTER — OFFICE VISIT (OUTPATIENT)
Facility: CLINIC | Age: 64
End: 2024-06-21

## 2024-06-21 VITALS — HEIGHT: 71 IN | BODY MASS INDEX: 29.54 KG/M2 | WEIGHT: 211 LBS

## 2024-06-21 DIAGNOSIS — S61.451A DOG BITE OF RIGHT HAND, INITIAL ENCOUNTER: Primary | ICD-10-CM
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
## 2024-06-21 PROCEDURE — 3008F BODY MASS INDEX DOCD: CPT | Performed by: FAMILY MEDICINE

## 2024-06-21 PROCEDURE — 99204 OFFICE O/P NEW MOD 45 MIN: CPT | Performed by: FAMILY MEDICINE

## 2024-06-21 RX ORDER — METRONIDAZOLE 500 MG/1
500 TABLET ORAL 3 TIMES DAILY
Qty: 30 TABLET | Refills: 0 | Status: CANCELLED
Start: 2024-06-21 | End: 2024-07-01

## 2024-06-21 RX ORDER — DOXYCYCLINE HYCLATE 100 MG
100 TABLET ORAL 2 TIMES DAILY
Qty: 20 TABLET | Refills: 0 | Status: CANCELLED
Start: 2024-06-21 | End: 2024-07-01

## 2024-06-21 RX ORDER — DICLOFENAC SODIUM 75 MG/1
75 TABLET, DELAYED RELEASE ORAL 2 TIMES DAILY
Qty: 28 TABLET | Refills: 1 | Status: SHIPPED | OUTPATIENT
Start: 2024-06-21

## 2024-06-21 NOTE — H&P
Sports Medicine Clinic Note     Subjective:    Chief Complaint: Dog bite to the right hand.    Date of Injury: 06/15/2024    History of Present Illness: The patient is a 63-year-old male who presents with a dog bite to his right hand. The injury resulted in a laceration between the thumb and index finger webspace. There was no concern for a foreign body within the laceration in ED. The patient received a tetanus booster on the day of injury. He has known allergies to penicillin and sulfa antibiotics. The patient was treated with doxycycline and flagyl. No reported fevers, chills, wound drainage.    Objective:    Right Hand Examination:    Inspection:  - Laceration present between the thumb and index finger webspace  - No signs of infection such as erythema, warmth, or pus  - No appreciable swelling    Palpation:  - Tenderness localized to the area of the laceration  - No foreign body palpated  - No crepitus or deformity    Range of Motion:  - Full range of motion in the thumb and fingers without difficulty or significant pain    Neurovascular:  - Sensation intact to light touch in all distributions of the right hand  - Capillary refill less than 2 seconds in all digits  - Strong radial and ulnar pulses    Diagnostic Tests:    Radiographs of the right hand were reviewed and revealed no fracture or radiopaque foreign body.    Assessment:    Dog bite to right hand with a laceration between the thumb and index finger webspace. No current signs of infection.    Plan:    Additional imaging/workup:  - None indicated at this time. Monitor for signs of infection.    Therapy:  - None indicated at this time.    Medications:  - Continue ABX as prescribed for antibiotic coverage.    Procedures:  - Suture removal in 10 to 14 days from the date of injury.    Activity Recommendations:  - Advise the patient to keep the hand clean and dry. Avoid immersion in water.  - Limit activities that may place stress on the affected  area.    Follow-Up:  - Follow-up appointment scheduled for suture removal in 10 to 14 days from injury. Instruct the patient to return sooner if signs of infection develop, including increased pain, redness, swelling, or discharge.      Cem Kumar DO, MARILYN   Primary Care Sports Medicine    Department of Orthopaedic Surgery  [facility]    [address] [place] [address]  [address] [place] [address]    t: [phone]  f: [phone]      [facility]

## 2024-06-26 ENCOUNTER — OFFICE VISIT (OUTPATIENT)
Facility: CLINIC | Age: 64
End: 2024-06-26

## 2024-06-26 VITALS — WEIGHT: 211 LBS | HEIGHT: 71 IN | BODY MASS INDEX: 29.54 KG/M2
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
## 2024-06-26 DIAGNOSIS — S61.451D DOG BITE OF RIGHT HAND, SUBSEQUENT ENCOUNTER: Primary | ICD-10-CM

## 2024-06-26 PROCEDURE — 99213 OFFICE O/P EST LOW 20 MIN: CPT | Performed by: FAMILY MEDICINE

## 2024-06-26 PROCEDURE — 15853 REMOVAL SUTR/STAPL XREQ ANES: CPT | Performed by: FAMILY MEDICINE

## 2024-06-26 PROCEDURE — 3008F BODY MASS INDEX DOCD: CPT | Performed by: FAMILY MEDICINE

## 2024-06-26 NOTE — PROGRESS NOTES
Sports Medicine Clinic Note    Subjective:    Chief Complaint: Dog bite to the right hand.    Date of Injury: 06/15/2024    Interval History: The patient is a 63-year-old male returning for follow-up regarding a dog bite injury to his right hand. The patient reports mild improvement in the wound since the last visit. He denies any fever, chills, increased pain, redness, or discharge from the wound. He has continued taking doxycycline as prescribed without any issues. No new complaints or injuries.    Objective:    Right Hand Examination:    Inspection:  Laceration healing well between the thumb and index finger webspace  Mild erythema noted around the wound edges, expected with healing  No purulent discharge, excessive swelling, or signs of active infection    Palpation:  Minimal tenderness over the site of the laceration  No palpable foreign body, crepitus, or deformity    Neurovascular:  Sensation intact to light touch in all distributions of the right hand  Capillary refill less than 2 seconds in all digits  Strong radial and ulnar pulses    Diagnostic Tests:    No new tests    Assessment:    Dog bite wound to the right hand, with a healing laceration between the thumb and index finger webspace. Mild erythema consistent with healing, no current signs of infection.    Plan:    Additional Imaging/Workup:  None indicated at this time. Continue monitoring for signs of infection.    Medications:  Continue current course of doxycycline to complete the antibiotic regimen.    Procedures:  Sutures removed today.    Activity Recommendations:  Continue to keep the hand clean and dry.  Avoid immersion in water until the wound is fully healed.  Gradually resume normal activities, avoiding stress on the affected area.    Follow-Up:  Tentative follow-up in 2 weeks to ensure complete healing of the wound. Instruct the patient to return sooner if any signs of infection develop, such as increased pain, redness, swelling, or  discharge.    Suture Removal Procedure:    Preparation:  The patient was positioned comfortably with the affected hand supported.  The laceration and surrounding area were inspected for signs of infection or complications. No abnormalities were noted.    Suture Removal:  The area was cleaned with an antiseptic solution.  Using sterile technique, a suture removal kit was prepared.  Each suture was identified. Using sterile forceps, the knot of the first suture was grasped, and sterile scissors were used to cut the suture close to the skin (7 sutures total).  The sutures were gently pulled out, ensuring minimal discomfort for the patient.  This process was repeated for each suture until all sutures were removed.    Post-Removal Care:  The laceration and surrounding skin were inspected again for any signs of dehiscence, infection, or other complications.  The area was cleaned again with an antiseptic solution.  A sterile bandage was applied to protect the area.      Cem Kumar DO, CAQSM   Primary Care Sports Medicine    Department of Orthopaedic Surgery  [facility]    [address] [place] [address]  [address] [place] [address]    t: [phone]  f: [phone]      [facility]

## 2024-08-01 DIAGNOSIS — F32.1 CURRENT MODERATE EPISODE OF MAJOR DEPRESSIVE DISORDER WITHOUT PRIOR EPISODE (HCC): ICD-10-CM

## 2024-08-01 RX ORDER — DESVENLAFAXINE SUCCINATE 50 MG/1
50 TABLET, EXTENDED RELEASE ORAL DAILY
Qty: 90 TABLET | Refills: 1 | Status: SHIPPED | OUTPATIENT
Start: 2024-08-01

## 2024-08-01 NOTE — TELEPHONE ENCOUNTER
Psychiatric Non-Scheduled (Anti-Anxiety) [id number]/01/2024 08:02 AM   Protocol Details Depression Screening completed within the past 12 months    In person appointment or virtual visit in the past 6 mos or appointment in next 3 mos        LOV 6/17/24     Last Refill   Medication Quantity Refills Start End   desvenlafaxine ER 50 MG Oral Tablet 24 Hr 90 tablet 1 2/28/2024        No future appointments.

## 2024-09-03 ENCOUNTER — TELEPHONE (OUTPATIENT)
Dept: FAMILY MEDICINE CLINIC | Facility: CLINIC | Age: 64
End: 2024-09-03

## 2024-09-03 DIAGNOSIS — F32.1 CURRENT MODERATE EPISODE OF MAJOR DEPRESSIVE DISORDER WITHOUT PRIOR EPISODE (HCC): ICD-10-CM

## 2024-09-03 RX ORDER — DESVENLAFAXINE 50 MG/1
50 TABLET, FILM COATED, EXTENDED RELEASE ORAL DAILY
Qty: 90 TABLET | Refills: 1 | Status: SHIPPED | OUTPATIENT
Start: 2024-09-03

## 2024-09-03 NOTE — TELEPHONE ENCOUNTER
Disp Refills Start End    DESVENLAFAXINE ER 50 MG Oral Tablet 24 Hr 90 tablet 1 8/1/2024 --    Sig - Route: TAKE 1 TABLET(50 MG) BY MOUTH DAILY - Oral    Sent to pharmacy as: Desvenlafaxine Succinate ER 50 MG Oral Tablet Extended Release 24 Hour (Pristiq)    Notes to Pharmacy: ZERO refills remain on this prescription. Your patient is requesting advance approval of refills for this medication to PREVENT ANY MISSED DOSES    E-Prescribing Status: Receipt confirmed by pharmacy (8/1/2024 12:10 PM CDT)        Called and spoke with pharmacy tech who confirmed they did not receive above script.  Script resent    Patient notified and verbalized understanding.

## 2024-09-03 NOTE — TELEPHONE ENCOUNTER
PT CALLED AND ADV THAT HE CALLED THE PHARMACY OVER THE WEEKEND ON SATURDAY FOR MEDICATION REFILL.    WAS ADV NO REFILLS ON FILE: PT GOING THROUGH SOME WITHDRAWALS AND NEEDS MEDS    DESVENLAFAXINE ER 50 MG Oral Tablet 24 Hr     ** PT ADV ON THE LAST  IT WAS FOR ONLY 30 TABLETS **    RODOLFO SCOTT     PLEASE ADV     THANK YOU

## 2024-09-25 NOTE — TELEPHONE ENCOUNTER
Drink plenty of clear liquids to stay hydrated. Return to the ER if you develop lightheadedness upon standing or shortness of breath.    Patient has been notified, verbalized understanding of information. Denies further questions.

## 2024-12-31 ENCOUNTER — HOSPITAL ENCOUNTER (OUTPATIENT)
Age: 64
Discharge: HOME | End: 2024-12-31
Payer: COMMERCIAL

## 2024-12-31 ENCOUNTER — TELEPHONE (OUTPATIENT)
Dept: FAMILY MEDICINE CLINIC | Facility: CLINIC | Age: 64
End: 2024-12-31

## 2024-12-31 VITALS
HEART RATE: 99 BPM | SYSTOLIC BLOOD PRESSURE: 130 MMHG | DIASTOLIC BLOOD PRESSURE: 82 MMHG | TEMPERATURE: 100 F | RESPIRATION RATE: 18 BRPM | OXYGEN SATURATION: 99 %

## 2024-12-31 DIAGNOSIS — R11.2 NAUSEA AND VOMITING IN ADULT: Primary | ICD-10-CM

## 2024-12-31 LAB
#MXD IC: 0.2 X10ˆ3/UL (ref 0.1–1)
BUN BLD-MCNC: 19 MG/DL (ref 7–18)
CHLORIDE BLD-SCNC: 103 MMOL/L (ref 98–112)
CO2 BLD-SCNC: 24 MMOL/L (ref 21–32)
CREAT BLD-MCNC: 0.6 MG/DL
EGFRCR SERPLBLD CKD-EPI 2021: 108 ML/MIN/1.73M2 (ref 60–?)
GLUCOSE BLD-MCNC: 335 MG/DL (ref 70–99)
GLUCOSE BLD-MCNC: 419 MG/DL (ref 70–99)
HCT VFR BLD AUTO: 45.3 %
HCT VFR BLD CALC: 45 %
HGB BLD-MCNC: 15.5 G/DL
ISTAT IONIZED CALCIUM FOR CHEM 8: 1.06 MMOL/L (ref 1.12–1.32)
LYMPHOCYTES # BLD AUTO: 0.6 X10ˆ3/UL (ref 1–4)
LYMPHOCYTES NFR BLD AUTO: 8.4 %
MCH RBC QN AUTO: 31.1 PG (ref 26–34)
MCHC RBC AUTO-ENTMCNC: 34.2 G/DL (ref 31–37)
MCV RBC AUTO: 91 FL (ref 80–100)
MIXED CELL %: 3.3 %
NEUTROPHILS # BLD AUTO: 5.9 X10ˆ3/UL (ref 1.5–7.7)
NEUTROPHILS NFR BLD AUTO: 88.3 %
PLATELET # BLD AUTO: 73 X10ˆ3/UL (ref 150–450)
POTASSIUM BLD-SCNC: 4.3 MMOL/L (ref 3.6–5.1)
RBC # BLD AUTO: 4.98 X10ˆ6/UL
SODIUM BLD-SCNC: 138 MMOL/L (ref 136–145)
WBC # BLD AUTO: 6.7 X10ˆ3/UL (ref 4–11)

## 2024-12-31 PROCEDURE — 82962 GLUCOSE BLOOD TEST: CPT | Performed by: PHYSICIAN ASSISTANT

## 2024-12-31 PROCEDURE — 99204 OFFICE O/P NEW MOD 45 MIN: CPT | Performed by: PHYSICIAN ASSISTANT

## 2024-12-31 PROCEDURE — 85025 COMPLETE CBC W/AUTO DIFF WBC: CPT | Performed by: PHYSICIAN ASSISTANT

## 2024-12-31 PROCEDURE — 80047 BASIC METABLC PNL IONIZED CA: CPT | Performed by: PHYSICIAN ASSISTANT

## 2024-12-31 RX ORDER — SODIUM CHLORIDE 9 MG/ML
1000 INJECTION, SOLUTION INTRAVENOUS ONCE
Status: COMPLETED | OUTPATIENT
Start: 2024-12-31 | End: 2024-12-31

## 2024-12-31 NOTE — TELEPHONE ENCOUNTER
Vomiting started at 3:00 am    Stomach was burning    Cramping now because he believes he's dehydrated    Can't keep water down    Please adv  Thank you

## 2024-12-31 NOTE — TELEPHONE ENCOUNTER
No openings today    Due to dehydration and history of diabetes, recommending evaluation    Recommending IC for evaluation    If feels like a tearing sensation, recommending someone drives him to ER immediately

## 2024-12-31 NOTE — ED INITIAL ASSESSMENT (HPI)
Patient reports mid abdominal pain/burning and constant vomiting since approx 0230 this morning, denies diarrhea, low grade fever, denies cough/congestion, hx appendectomy

## 2024-12-31 NOTE — TELEPHONE ENCOUNTER
Patient states he started vomiting today at 3 am.  No diarrhea.  States he has a burning sensation down middle of abdomen  Does not feel feverish  No sick contacts.     Looking for recommendations.    Routed to Gabi TRAECY as covering provider to advise

## 2024-12-31 NOTE — ED PROVIDER NOTES
Patient Seen in: [facility]      History     Chief Complaint   Patient presents with    Vomiting     Stated Complaint: VOMITING    Subjective:   HPI      64 year old male presents to IC with vomiting and abdominal discomfort since 3am. No diarrhea.    Objective:     Past Medical History:    Acute bronchitis    Acute respiratory failure due to COVID-19 ([facility])    Anesthesia complication    agitated upon waking with  cervical fusion    Back problem    cervical surgery    Bowel obstruction ([facility])    Calculus of kidney    Cellulitis    Conjunctivitis    COVID-19 virus infection    Deep vein thrombosis (HCC)    left arm, after bowel obstruction, medicine related per patient    Depression    Diabetes ([facility])    Neuropathy    \"whole body\" per patient    Osteoarthritis    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 Benítez    total shoulder arthroplasty    Appendectomy      rupture    Cholecystectomy  10/2003    Forearm/wrist surgery unlisted Bilateral 1999    carpal tunnel R wrist, l wrist    Leg/ankle surgery proc unlisted      fx R leg    Other surgical history      exploratory laparotomy    Other surgical history      partial colectomy    Spine surgery procedure unlisted  3/2011    cervical vertebral fusion    Total knee replacement Right     Vasectomy  1999                Social History     Socioeconomic History    Marital status:    Occupational History    Occupation: Disabled   Tobacco Use    Smoking status: Never    Smokeless tobacco: Never   Vaping Use    Vaping status: Never Used   Substance and Sexual Activity    Alcohol use: Not Currently      Alcohol/week: 0.0 standard drinks of alcohol     Comment: 10 drinks per year    Drug use: No   Other Topics Concern    Caffeine Concern Yes     Comment: 1 liter of soda daily     Exercise Yes     Comment: PT 3 times per week    Seat Belt Yes     Social Drivers of Health     Food Insecurity: Low Risk  (1/9/2023)    Received from [facility]    Food Insecurity     Have there been times that your food ran out, and you didn't have money to get more?: No     Are there times that you worry that this might happen?: No   Transportation Needs: Low Risk  (1/9/2023)    Received from [facility]    Transportation Needs     Do you have trouble getting transportation to medical appointments?: No    Received from [facility], [facility]    Social Connections   Housing Stability:   Recent Concern: Housing Stability - High Risk (1/9/2023)    Received from [facility]    Housing Stability     Are you concerned about having a safe and reliable place to live?: Yes              Review of Systems    Positive for stated complaint: VOMITING  Other systems are as noted in HPI.  Constitutional and vital signs reviewed.      All other systems reviewed and negative except as noted above.    Physical Exam     ED Triage Vitals   BP 12/31/24 1457 130/82   Pulse 12/31/24 1457 99   Resp 12/31/24 1457 18   Temp 12/31/24 1457 99.9 °F (37.7 °C)   Temp src 12/31/24 1457 Oral   SpO2 12/31/24 1457 99 %   O2 Device 12/31/24 1350 None (Room air)       Current Vitals:   Vital Signs  BP: 130/82  Pulse: 99  Resp: 18  Temp: 99.9 °F (37.7 °C)  Temp src: Oral    Oxygen Therapy  SpO2: 99 %  O2 Device: None (Room air)        Physical Exam  Vitals and nursing note reviewed.   Constitutional:       Appearance: He is well-developed.   HENT:      Head: Atraumatic.       Right Ear: External ear normal.      Left Ear: External ear normal.   Eyes:      Conjunctiva/sclera: Conjunctivae normal.   Cardiovascular:      Rate and Rhythm: Normal rate and regular rhythm.   Pulmonary:      Effort: Pulmonary effort is normal.   Abdominal:      Palpations: Abdomen is soft.      Tenderness: There is no abdominal tenderness. There is no guarding or rebound.   Musculoskeletal:      Cervical back: Normal range of motion and neck supple.   Skin:     General: Skin is warm and dry.      Capillary Refill: Capillary refill takes less than 2 seconds.   Neurological:      Mental Status: He is alert.   Psychiatric:         Mood and Affect: Mood normal.             ED Course     Labs Reviewed   POCT CBC - Abnormal; Notable for the following components:       Result Value    PLT IC 73.0 (*)     # Lymphocyte 0.6 (*)     All other components within normal limits   POCT ISTAT CHEM8 CARTRIDGE - Abnormal; Notable for the following components:    ISTAT BUN 19 (*)     ISTAT Ionized Calcium 1.06 (*)     ISTAT Glucose 419 (*)     ISTAT Creatinine 0.60 (*)     All other components within normal limits                   MDM        64year old male presents with vomiting and abdominal discomfort. Abd exam is benign.    Differential diagnosis reflecting the complexity of care include: gastroenteritis, electrolytes abnormalities, dehydration    External chart review was done and was noted:  History of appendectomy        Will start IV fluids and check labs. Patient refused zofran.    Labs unremarkable  Re-evaluation 1619: patient passed po challenge. Feels great, feels comfortable enough to go home.  Repeat accucheck  335. Patient is aware of diabetes but refuses his medications.    Medical Decision Making      Disposition and Plan     Clinical Impression:  1. Nausea and vomiting in adult         Disposition:  Discharge  12/31/2024  4:18 pm    Follow-up:  Genesis Will,   [address] [facility]  [address]  [phone]                Medications Prescribed:  Current Discharge Medication List              Supplementary Documentation:

## 2025-02-01 DIAGNOSIS — F32.1 CURRENT MODERATE EPISODE OF MAJOR DEPRESSIVE DISORDER WITHOUT PRIOR EPISODE (HCC): ICD-10-CM

## 2025-02-01 RX ORDER — DESVENLAFAXINE 50 MG/1
50 TABLET, FILM COATED, EXTENDED RELEASE ORAL DAILY
Qty: 90 TABLET | Refills: 1 | Status: SHIPPED | OUTPATIENT
Start: 2025-02-01

## 2025-02-01 NOTE — TELEPHONE ENCOUNTER
Patient notified and verbalized understanding.   Declined scheduling at this time. He will call back to schedule.

## 2025-02-01 NOTE — TELEPHONE ENCOUNTER
Psychiatric Non-Scheduled (Anti-Anxiety) [id number]/01/2025 08:02 AM   Protocol Details In person appointment or virtual visit in the past 6 mos or appointment in next 3 mos    Depression Screening completed within the past 12 months    Medication is active on med list     Last refill:   Medication Quantity Refills Start End   desvenlafaxine ER 50 MG Oral Tablet 24 Hr 90 tablet 1 9/3/2024      Last Visit: 6/17/24    Next Visit: No future appointments.      Forward to Dr. Will please advise on refills. Thanks.

## 2025-02-27 ENCOUNTER — OFFICE VISIT (OUTPATIENT)
Dept: FAMILY MEDICINE CLINIC | Facility: CLINIC | Age: 65
End: 2025-02-27
Payer: COMMERCIAL

## 2025-02-27 VITALS
WEIGHT: 204.81 LBS | HEART RATE: 75 BPM | TEMPERATURE: 97 F | BODY MASS INDEX: 29 KG/M2 | DIASTOLIC BLOOD PRESSURE: 78 MMHG | SYSTOLIC BLOOD PRESSURE: 132 MMHG | OXYGEN SATURATION: 95 % | RESPIRATION RATE: 20 BRPM

## 2025-02-27 DIAGNOSIS — E11.9 TYPE 2 DIABETES MELLITUS WITHOUT COMPLICATION, WITHOUT LONG-TERM CURRENT USE OF INSULIN (HCC): ICD-10-CM

## 2025-02-27 DIAGNOSIS — S09.92XA INJURY OF NOSE, INITIAL ENCOUNTER: Primary | ICD-10-CM

## 2025-02-27 DIAGNOSIS — L03.211 CELLULITIS OF FACE: ICD-10-CM

## 2025-02-27 LAB
ALBUMIN SERPL-MCNC: 4.2 G/DL (ref 3.2–4.8)
ALBUMIN/GLOB SERPL: 1.3 {RATIO} (ref 1–2)
ALP LIVER SERPL-CCNC: 134 U/L
ALT SERPL-CCNC: 51 U/L
ANION GAP SERPL CALC-SCNC: 8 MMOL/L (ref 0–18)
AST SERPL-CCNC: 39 U/L (ref ?–34)
BASOPHILS # BLD AUTO: 0.03 X10(3) UL (ref 0–0.2)
BASOPHILS NFR BLD AUTO: 0.5 %
BILIRUB SERPL-MCNC: 2 MG/DL (ref 0.2–1.1)
BUN BLD-MCNC: 11 MG/DL (ref 9–23)
CALCIUM BLD-MCNC: 9.5 MG/DL (ref 8.7–10.6)
CHLORIDE SERPL-SCNC: 101 MMOL/L (ref 98–112)
CHOLEST SERPL-MCNC: 182 MG/DL (ref ?–200)
CO2 SERPL-SCNC: 28 MMOL/L (ref 21–32)
CREAT BLD-MCNC: 0.68 MG/DL
CREAT UR-SCNC: 101 MG/DL
EGFRCR SERPLBLD CKD-EPI 2021: 104 ML/MIN/1.73M2 (ref 60–?)
EOSINOPHIL # BLD AUTO: 0.19 X10(3) UL (ref 0–0.7)
EOSINOPHIL NFR BLD AUTO: 3.2 %
ERYTHROCYTE [DISTWIDTH] IN BLOOD BY AUTOMATED COUNT: 13 %
FASTING PATIENT LIPID ANSWER: NO
FASTING STATUS PATIENT QL REPORTED: NO
GLOBULIN PLAS-MCNC: 3.2 G/DL (ref 2–3.5)
GLUCOSE BLD-MCNC: 202 MG/DL (ref 70–99)
HCT VFR BLD AUTO: 41.2 %
HDLC SERPL-MCNC: 66 MG/DL (ref 40–59)
HGB BLD-MCNC: 14.6 G/DL
IMM GRANULOCYTES # BLD AUTO: 0.02 X10(3) UL (ref 0–1)
IMM GRANULOCYTES NFR BLD: 0.3 %
LDLC SERPL CALC-MCNC: 104 MG/DL (ref ?–100)
LYMPHOCYTES # BLD AUTO: 1.74 X10(3) UL (ref 1–4)
LYMPHOCYTES NFR BLD AUTO: 29.1 %
MCH RBC QN AUTO: 31.9 PG (ref 26–34)
MCHC RBC AUTO-ENTMCNC: 35.4 G/DL (ref 31–37)
MCV RBC AUTO: 90.2 FL
MICROALBUMIN UR-MCNC: 2.4 MG/DL
MICROALBUMIN/CREAT 24H UR-RTO: 23.8 UG/MG (ref ?–30)
MONOCYTES # BLD AUTO: 0.71 X10(3) UL (ref 0.1–1)
MONOCYTES NFR BLD AUTO: 11.9 %
NEUTROPHILS # BLD AUTO: 3.29 X10 (3) UL (ref 1.5–7.7)
NEUTROPHILS # BLD AUTO: 3.29 X10(3) UL (ref 1.5–7.7)
NEUTROPHILS NFR BLD AUTO: 55 %
NONHDLC SERPL-MCNC: 116 MG/DL (ref ?–130)
OSMOLALITY SERPL CALC.SUM OF ELEC: 289 MOSM/KG (ref 275–295)
PLATELET # BLD AUTO: 66 10(3)UL (ref 150–450)
PLATELETS.RETICULATED NFR BLD AUTO: 5.8 % (ref 0–7)
POTASSIUM SERPL-SCNC: 4 MMOL/L (ref 3.5–5.1)
PROT SERPL-MCNC: 7.4 G/DL (ref 5.7–8.2)
RBC # BLD AUTO: 4.57 X10(6)UL
SODIUM SERPL-SCNC: 137 MMOL/L (ref 136–145)
TRIGL SERPL-MCNC: 63 MG/DL (ref 30–149)
TSI SER-ACNC: 1.87 UIU/ML (ref 0.55–4.78)
VLDLC SERPL CALC-MCNC: 11 MG/DL (ref 0–30)
WBC # BLD AUTO: 6 X10(3) UL (ref 4–11)

## 2025-02-27 PROCEDURE — 80050 GENERAL HEALTH PANEL: CPT | Performed by: FAMILY MEDICINE

## 2025-02-27 PROCEDURE — 80061 LIPID PANEL: CPT | Performed by: FAMILY MEDICINE

## 2025-02-27 PROCEDURE — 83036 HEMOGLOBIN GLYCOSYLATED A1C: CPT | Performed by: FAMILY MEDICINE

## 2025-02-27 PROCEDURE — 3075F SYST BP GE 130 - 139MM HG: CPT | Performed by: FAMILY MEDICINE

## 2025-02-27 PROCEDURE — 82043 UR ALBUMIN QUANTITATIVE: CPT | Performed by: FAMILY MEDICINE

## 2025-02-27 PROCEDURE — 3078F DIAST BP <80 MM HG: CPT | Performed by: FAMILY MEDICINE

## 2025-02-27 PROCEDURE — 82570 ASSAY OF URINE CREATININE: CPT | Performed by: FAMILY MEDICINE

## 2025-02-27 PROCEDURE — 99215 OFFICE O/P EST HI 40 MIN: CPT | Performed by: FAMILY MEDICINE

## 2025-02-27 RX ORDER — DOXYCYCLINE 100 MG/1
100 CAPSULE ORAL 2 TIMES DAILY
Qty: 20 CAPSULE | Refills: 0 | Status: SHIPPED | OUTPATIENT
Start: 2025-02-27 | End: 2025-03-09

## 2025-02-27 NOTE — PROGRESS NOTES
Smith Roman is a 64 year old male.  Chief Complaint   Patient presents with    Pain     Fell face first on sidewalk and feels like broken nose        HPI:   Sunday he tripped on ice while walking and fell forward and hit his face on the sidewalk.   No SOB.   No vision changes.   Does not hurt on bridge, but hurst int he middle/left and on upper lip up to nose.   Gums were sore at first.   No dental loss.   Bled from nose a lot at first.   The bleeding didn't stop for 2 days.   Hard to breath through nose, worse on left.   Burning in nostile.   No eye pain.   Cannot smell since then.   No pain with chewing, eating okay.   Has pressure in head when he lays back.   + headaches that get better with OTC meds.   No dizziness.     ALLERGIES:  Allergies[1]      Current Outpatient Medications   Medication Sig Dispense Refill    doxycycline 100 MG Oral Cap Take 1 capsule (100 mg total) by mouth 2 (two) times daily for 10 days. 20 capsule 0    DESVENLAFAXINE ER 50 MG Oral Tablet 24 Hr TAKE 1 TABLET(50 MG) BY MOUTH DAILY 90 tablet 1    albuterol 108 (90 Base) MCG/ACT Inhalation Aero Soln Inhale 2 puffs into the lungs every 6 (six) hours as needed for Shortness of Breath. 1 each 3    OneTouch Delica Lancets 33G Does not apply Misc Use to test blood sugar three times daily as directed. Dx E11.65 300 each 1    diclofenac 75 MG Oral Tab EC Take 1 tablet (75 mg total) by mouth 2 (two) times daily. (Patient not taking: Reported on 2/27/2025) 28 tablet 1    metFORMIN HCl ER, OSM, 500 MG (OSM) Oral Tablet 24 Hr Take 1 tablet (500 mg total) by mouth daily with breakfast. (Patient not taking: Reported on 2/27/2025)      empagliflozin (JARDIANCE) 10 MG Oral Tab Take 1 tablet (10 mg total) by mouth daily. (Patient not taking: Reported on 2/27/2025) 30 tablet 2    JANUVIA 100 MG Oral Tab TAKE 1 TABLET(100 MG) BY MOUTH IN THE MORNING (Patient not taking: Reported on 2/27/2025) 90 tablet 0    HYDROcodone-acetaminophen  MG Oral Tab  Take 1 tablet by mouth every 6 (six) hours as needed for Pain. 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    left    Pre-operative cardiovascular examination    Preoperative examination, unspecified    Prosthetic joint infection, initial encounter    Sleep apnea    Tinea pedis    Vertigo    Visual impairment    glassses      Social History:  Social History     Socioeconomic History    Marital status:    Occupational History    Occupation: Disabled   Tobacco Use    Smoking status: Never    Smokeless tobacco: Never   Vaping Use    Vaping status: Never Used   Substance and Sexual Activity    Alcohol use: Not Currently     Alcohol/week: 0.0 standard drinks of alcohol     Comment: 10 drinks per year    Drug use: No   Other Topics Concern    Caffeine Concern Yes     Comment: 1 liter of soda daily     Exercise Yes     Comment: PT 3 times per week    Seat Belt Yes     Social Drivers of Health     Food Insecurity: No Food Insecurity (2/27/2025)    NCSS - Food Insecurity     Worried About Running Out of Food in the Last Year: No     Ran Out of Food in the Last Year: No   Transportation Needs: No Transportation Needs (2/27/2025)    NCSS - Transportation     Lack of Transportation: No   Housing Stability: Not At Risk (2/27/2025)    NCSS - Housing/Utilities     Has Housing: Yes     Worried About Losing Housing: No     Unable to Get Utilities: No         BP Readings from Last 6 Encounters:   02/27/25 132/78   12/31/24 130/82   06/17/24 130/80   06/15/24 129/79   12/28/23 132/70   11/14/23 130/70       Wt Readings from Last 6 Encounters:   02/27/25 204 lb 12.8 oz (92.9 kg)   06/26/24 211 lb (95.7 kg)   06/21/24 211 lb (95.7 kg)   06/17/24 211 lb (95.7 kg)   06/15/24 205 lb (93 kg)   12/28/23 223 lb 2 oz (101.2 kg)       REVIEW OF SYSTEMS:   GENERAL HEALTH: feels well no complaints  SKIN: denies any unusual skin lesions or rashes  RESPIRATORY: denies shortness of breath    CARDIOVASCULAR: denies chest pain    GI: denies abdominal pain and denies heartburn  NEURO: denies headaches    EXAM:   /78   Pulse 75   Temp 97 °F (36.1 °C) (Temporal)   Resp 20   Wt 204 lb 12.8 oz (92.9 kg)   SpO2 95%   BMI 28.56 kg/m²  Body mass index is 28.56 kg/m².      GENERAL: well developed, well nourished,in no apparent distress  SKIN: no rashes,no suspicious lesions  HEENT: normocephalic,ears and throat are clear, + tenderness in bridge of nose L>R and onto upper lip and left maxillary area, + swelling in the nose on the L>R. + bruising around left eye without swelling.   NECK: supple,no adenopathy,   LUNGS: clear to auscultation, no rales or wheezing   CARDIO: RRR without murmur  GI: good BS's,no masses, HSM or tenderness  EXTREMITIES: no cyanosis, clubbing or edema  Bilateral barefoot skin diabetic exam is normal, visualized feet and the appearance is normal.  Bilateral monofilament/sensation of both feet is normal.  Pulsation pedal pulse exam of both lower legs/feet is normal as well.      ASSESSMENT AND PLAN:     Encounter Diagnoses   Name Primary?    Injury of nose, initial encounter Yes    Cellulitis of face     Type 2 diabetes mellitus without complication, without long-term current use of insulin ([facility])        Diagnoses and all orders for this visit:    Injury of nose, initial encounter  -     CT FACIAL BONES (CPT=70486); Future    Cellulitis of face  -      doxycycline 100 MG Oral Cap; Take 1 capsule (100 mg total) by mouth 2 (two) times daily for 10 days.    Type 2 diabetes mellitus without complication, without long-term current use of insulin (HCC)  -     CBC With Differential With Platelet; Future  -     Comp Metabolic Panel (14); Future  -     Hemoglobin A1C; Future  -     Lipid Panel; Future  -     TSH W Reflex To Free T4; Future  -     Microalb/Creat Ratio, Random Urine; Future  -     VENIPUNCTURE    Testing as ordered.   Follow up for physical.     Orders Placed This Encounter   Procedures    CBC With Differential With Platelet     Standing Status:   Future     Number of Occurrences:   1     Standing Expiration Date:   2/28/2026    Comp Metabolic Panel (14)     Standing Status:   Future     Number of Occurrences:   1     Standing Expiration Date:   2/28/2026     Order Specific Question:   [facility]: Has the patient fasted?     Answer:   Yes    Hemoglobin A1C     Standing Status:   Future     Number of Occurrences:   1     Standing Expiration Date:   2/28/2026    Lipid Panel     Standing Status:   Future     Number of Occurrences:   1     Standing Expiration Date:   2/27/2026     Order Specific Question:   [facility]: Has the patient fasted?     Answer:   Yes    TSH W Reflex To Free T4     Standing Status:   Future     Number of Occurrences:   1     Standing Expiration Date:   2/28/2026    Microalb/Creat Ratio, Random Urine     Standing Status:   Future     Number of Occurrences:   1     Standing Expiration Date:   2/27/2026    VENIPUNCTURE     Order Specific Question:   Release to patient     Answer:   Immediate               Meds & Refills for this Visit:  Requested Prescriptions     Signed Prescriptions Disp Refills    doxycycline 100 MG Oral Cap 20 capsule 0     Sig: Take 1 capsule (100 mg total) by mouth 2 (two) times daily for 10 days.             The patient indicates understanding of these issues and agrees to the plan.               [1]   Allergies  Allergen  Reactions    Penicillins HIVES    Tramadol NAUSEA ONLY     Head ache    Sulfa Antibiotics HIVES    Eggs Or Egg-Derived Products DIARRHEA    Hydrocodone-Acetaminophen NAUSEA ONLY     Headaches  Patient can take generic Norco without any reactions  Headaches  Patient can take generic Norco without any reactions  Headaches  Patient can take generic Norco without any reactions

## 2025-02-28 ENCOUNTER — HOSPITAL ENCOUNTER (OUTPATIENT)
Dept: CT IMAGING | Age: 65
Discharge: HOME | End: 2025-02-28
Attending: FAMILY MEDICINE
Payer: COMMERCIAL

## 2025-02-28 DIAGNOSIS — S09.92XA INJURY OF NOSE, INITIAL ENCOUNTER: ICD-10-CM

## 2025-02-28 LAB
EST. AVERAGE GLUCOSE BLD GHB EST-MCNC: 258 MG/DL (ref 68–126)
HBA1C MFR BLD: 10.6 % (ref ?–5.7)

## 2025-02-28 PROCEDURE — 70486 CT MAXILLOFACIAL W/O DYE: CPT | Performed by: FAMILY MEDICINE

## 2025-02-28 PROCEDURE — 76377 3D RENDER W/INTRP POSTPROCES: CPT | Performed by: FAMILY MEDICINE

## 2025-03-04 ENCOUNTER — TELEPHONE (OUTPATIENT)
Dept: FAMILY MEDICINE CLINIC | Facility: CLINIC | Age: 65
End: 2025-03-04

## 2025-03-04 DIAGNOSIS — E11.9 NEW ONSET TYPE 2 DIABETES MELLITUS (HCC): ICD-10-CM

## 2025-03-04 DIAGNOSIS — J34.89 SORE IN NOSE: Primary | ICD-10-CM

## 2025-03-04 RX ORDER — MUPIROCIN CALCIUM 20 MG/G
1 CREAM TOPICAL 2 TIMES DAILY
Qty: 15 G | Refills: 0 | Status: SHIPPED | OUTPATIENT
Start: 2025-03-04 | End: 2025-03-11

## 2025-03-04 NOTE — TELEPHONE ENCOUNTER
Okay, for DM, I sent in jardiance. He's been on this before, at least I've sent it in before.   Take that once a day.   He also needs to work on diet, less sugars and carbs.     As far as the nose, I will send in mupirocin cream to put on his nose sore. Can use vaseline in addition to that if needed.   Do not use vicks.     If nose is bleeding heavily and not stopping after 30 min, I recommend ER.   Can see ENT if this continues to be a trend.

## 2025-03-04 NOTE — TELEPHONE ENCOUNTER
Spoke with patient who states yesterday he had a nose bleed out of the left side of his nose that lasted 3-4 hours. States it was not gushing but was consistent stream of blood. No bleeding today.    For the last few days he has noticed a funny smell. States Dr Will has him on an antibiotic for infection in his nose and is wondering if this could be related.    Also has a dry red area at the base of his nose. Area is not open or draining but is getting bigger.  He has but has been putting vicks on it. Advised vicks may irritate the area. Try vasoline.  Patient asking if maybe that is part of the infection as well and if Dr Will has a cream she can reocmmend.     See result note on 2/27/25 labs and 2/28/25 CT  Patient reports swelling is not getting worse, but not really any better  Is not taking any medication for diabetes. Does not want to take metfromin as his brother got cancer from taking    Routed to Dr Will to advise

## 2025-03-04 NOTE — TELEPHONE ENCOUNTER
Patient states his nose started bleeding again and it smells funny    He is also returning the nurses call from yesterday    Please adv  Thank you

## 2025-04-22 ENCOUNTER — APPOINTMENT (OUTPATIENT)
Dept: GENERAL RADIOLOGY | Facility: HOSPITAL | Age: 65
End: 2025-04-22
Payer: COMMERCIAL

## 2025-04-22 ENCOUNTER — APPOINTMENT (OUTPATIENT)
Dept: ULTRASOUND IMAGING | Facility: HOSPITAL | Age: 65
End: 2025-04-22
Attending: EMERGENCY MEDICINE
Payer: COMMERCIAL

## 2025-04-22 ENCOUNTER — HOSPITAL ENCOUNTER (EMERGENCY)
Facility: HOSPITAL | Age: 65
Discharge: HOME | End: 2025-04-22
Attending: EMERGENCY MEDICINE
Payer: COMMERCIAL

## 2025-04-22 ENCOUNTER — TELEPHONE (OUTPATIENT)
Dept: FAMILY MEDICINE CLINIC | Facility: CLINIC | Age: 65
End: 2025-04-22

## 2025-04-22 VITALS
OXYGEN SATURATION: 94 % | DIASTOLIC BLOOD PRESSURE: 68 MMHG | TEMPERATURE: 100 F | HEART RATE: 92 BPM | RESPIRATION RATE: 16 BRPM | SYSTOLIC BLOOD PRESSURE: 124 MMHG

## 2025-04-22 DIAGNOSIS — S83.91XA SPRAIN OF RIGHT KNEE, UNSPECIFIED LIGAMENT, INITIAL ENCOUNTER: Primary | ICD-10-CM

## 2025-04-22 PROCEDURE — 73562 X-RAY EXAM OF KNEE 3: CPT

## 2025-04-22 PROCEDURE — 73552 X-RAY EXAM OF FEMUR 2/>: CPT

## 2025-04-22 PROCEDURE — 99284 EMERGENCY DEPT VISIT MOD MDM: CPT

## 2025-04-22 PROCEDURE — 93971 EXTREMITY STUDY: CPT | Performed by: EMERGENCY MEDICINE

## 2025-04-22 PROCEDURE — S0119 ONDANSETRON 4 MG: HCPCS | Performed by: EMERGENCY MEDICINE

## 2025-04-22 RX ORDER — HYDROCODONE BITARTRATE AND ACETAMINOPHEN 5; 325 MG/1; MG/1
2 TABLET ORAL ONCE
Refills: 0 | Status: COMPLETED | OUTPATIENT
Start: 2025-04-22 | End: 2025-04-22

## 2025-04-22 RX ORDER — ONDANSETRON 4 MG/1
4 TABLET, ORALLY DISINTEGRATING ORAL ONCE
Status: DISCONTINUED | OUTPATIENT
Start: 2025-04-22 | End: 2025-04-22

## 2025-04-22 RX ORDER — NAPROXEN 500 MG/1
500 TABLET ORAL 2 TIMES DAILY PRN
Qty: 20 TABLET | Refills: 0 | Status: SHIPPED | OUTPATIENT
Start: 2025-04-22 | End: 2025-04-29

## 2025-04-22 NOTE — TELEPHONE ENCOUNTER
PT CALLED AND ADV WAS PUTTING TOGETHER A BED FRAME ON SATURDAY.    ADV LOST HIS BEARINGS AND HIT HEAD ON DRY WALL.    PT ADV TODAY WOKE UP AND R LEG IS EXTREMELY SWOLLEN AND VERY PAINFUL TO WALK ON IT, SAME LEG THAT HE HAS HAD 2 SURGERY'S ON. DIDN'T FEEL ANY POP, NO TRAUMA TO LEG THAT HE RECALLS    PT LOOKING FOR RECOMMENDATIONS - ADV  DID NOT HAVE ANY OPENINGS AND PT ADV WOULDN'T BE ABLE COME IN -NOT ABLE TO WALK.    PLEASE ADV    THANK YOU

## 2025-04-22 NOTE — ED PROVIDER NOTES
Patient Seen in: [facility] Emergency Department      History     Chief Complaint   Patient presents with    Leg Swelling     Stated Complaint: right leg swelling    Subjective:   HPI    64-year-old male comes to the hospital complaint of having difficulty pain in the of his right leg.  Swollen as well.  Feels greatly swollen and painful in the area of the knee.  He states on Saturday he was building a bed frame when he lost balance and fell.  He was having no significant pain on Saturday or Sunday but pain began yesterday and swelling as well.  He is concerned because he has a history of having DVTs in the past.  He is also had 2 knee replacements on the right as well.  He has pain with walking.  He denies any fevers or chills.  No chest pain or troubles breathing.  Is denying any other complaints at this time.  He is on no anticoagulants at this time.  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 Benítez    total shoulder arthroplasty    Appendectomy      rupture    Cholecystectomy  10/2003    Forearm/wrist surgery  unlisted Bilateral 1999    carpal tunnel R wrist, l wrist    Leg/ankle surgery proc unlisted      fx R leg    Other surgical history      exploratory laparotomy    Other surgical history      partial colectomy    Spine surgery procedure unlisted  3/2011    cervical vertebral fusion    Total knee replacement Right     Vasectomy  1999                Social History     Socioeconomic History    Marital status:    Occupational History    Occupation: Disabled   Tobacco Use    Smoking status: Never    Smokeless tobacco: Never   Vaping Use    Vaping status: Never Used   Substance and Sexual Activity    Alcohol use: Not Currently     Alcohol/week: 0.0 standard drinks of alcohol     Comment: 10 drinks per year    Drug use: No   Other Topics Concern    Caffeine Concern Yes     Comment: 1 liter of soda daily     Exercise Yes     Comment: PT 3 times per week    Seat Belt Yes     Social Drivers of Health     Food Insecurity: No Food Insecurity (2/27/2025)    NCSS - Food Insecurity     Worried About Running Out of Food in the Last Year: No     Ran Out of Food in the Last Year: No   Transportation Needs: No Transportation Needs (2/27/2025)    NCSS - Transportation     Lack of Transportation: No   Housing Stability: Not At Risk (2/27/2025)    NCSS - Housing/Utilities     Has Housing: Yes     Worried About Losing Housing: No     Unable to Get Utilities: No                                Physical Exam     ED Triage Vitals   BP 04/22/25 1339 124/68   Pulse 04/22/25 1336 92   Resp 04/22/25 1336 16   Temp 04/22/25 1336 99.9 °F (37.7 °C)   Temp src 04/22/25 1336 Temporal   SpO2 04/22/25 1336 94 %   O2 Device 04/22/25 1336 None (Room air)       Current Vitals:   Vital Signs  BP: 124/68  Pulse: 92  Resp: 16  Temp: 99.9 °F (37.7 °C)  Temp src: Temporal    Oxygen Therapy  SpO2: 94 %  O2 Device: None (Room air)        Physical Exam  HEENT; NCAT, EOMI, throat clear, neck supple, no LAD, no JVD  Heart S1S2 RRR  lungs: CTAB  abd: Soft NT,  ND,  NABS without rebound or guarding  Ext there is edema noted in the area the right compared left greatest at the area of the right knee.  Range of motion is.  Reduced pain with range of motion.  Knee effusion noted.  There is tenderness down the leg as well without erythema and is otherwise neurovascularly intact.  Physical Exam                ED Course   Labs Reviewed - No data to display    ED Course as of 04/22/25 1633  ------------------------------------------------------------  Time: 04/22 1631  Comment: While here the patient had a x-ray done of the right knee that I interpreted showed no acute fracture.  With radiology report as well.  His femur x-ray was negative as well.  He had an ultrasound on his right leg that showed a Baker's cyst but no DVT.  He was given Norco for pain.  Ace wrap was applied     Results            US VENOUS DOPPLER LEG RIGHT - DIAG IMG (CPT=93971)  Result Date: 4/22/2025  PROCEDURE:  US VENOUS DOPPLER LEG RIGHT - DIAG IMG (CPT=93971)  COMPARISON:  None.  INDICATIONS:  eval for DVT, right lower extremity pain and swelling  TECHNIQUE:  Real time, grey scale, and duplex ultrasound was used to evaluate the lower extremity venous system. B-mode two-dimensional images of the vascular structures, Doppler spectral analysis, and color flow.  Doppler imaging were performed.  The following veins were imaged:  Common, deep, and superficial femoral, popliteal, sapheno-femoral junction, posterior tibial veins, and the contralateral common femoral vein.  PATIENT STATED HISTORY: (As transcribed by Technologist)  Patient reports right leg pain and swelling.    FINDINGS:  EXTREMITY EXAMINED:  Right lower extremity THROMBI:  None visible. COMPRESSION:  Normal compressibility, phasicity, and augmentation. OTHER:  Popliteal cyst measuring 5.3 x 1.5 x 5.0 cm.            CONCLUSION:  No acute deep vein thrombosis   LOCATION:  [facility]    Dictated by (CST): Milton Brooke MD on 4/22/2025 at 4:21 PM      Finalized by (CST): Milton Brooke MD on 4/22/2025 at 4:23 PM       XR KNEE (3 VIEWS), RIGHT (CPT=73562)  Result Date: 4/22/2025  PROCEDURE:  XR KNEE ROUTINE (3 VIEWS), RIGHT (CPT=73562)  TECHNIQUE:  Three views were obtained including patellar view.  COMPARISON:  [facility], XR, XR KNEE (1 OR 2 VIEWS), RIGHT (CPT=73560), 12/31/2019, 11:00 AM.  INDICATIONS:  PATIENT STATED HISTORY: (As transcribed by Technologist)  Patient states that he fell on Saturday and now he has swelling and pain in his right femur and right knee. Patient adds that he had his right knee replaced around 2013.     FINDINGS:  Compared to 2019, interval knee arthroplasty revision with long stem femoral and tibial components.  No evidence of acute hardware or osseous fracture.               CONCLUSION:  Negative for acute fracture.  Continued clinical correlation recommended.   LOCATION:  [facility]   Dictated by (CST): Robert Hopson MD on 4/22/2025 at 2:46 PM     Finalized by (CST): Robert Hopson MD on 4/22/2025 at 2:47 PM       XR FEMUR MIN 2 VIEWS RIGHT (CPT=73552)  Result Date: 4/22/2025  PROCEDURE:  XR FEMUR MIN 2 VIEWS RIGHT (CPT=73552)  TECHNIQUE:  AP and lateral views were obtained.  COMPARISON:  None.  INDICATIONS:  right leg swelling  PATIENT STATED HISTORY: (As transcribed by Technologist)  Patient states that he fell on Saturday and now he has swelling and pain in his right femur and right knee. Patient adds that he had his right knee replaced around 2013.    FINDINGS:  There is mild to moderate right hip arthritis.  A knee arthroplasty system is partially imaged.  No acute osseous or hardware fracture.               CONCLUSION:  Negative for acute fracture.  Continued clinical correlation recommended.   LOCATION:  [facility]   Dictated by (CST): Robert Hopson MD on 4/22/2025 at 2:42 PM     Finalized by (CST): Robert Hopson MD on 4/22/2025 at 2:43 PM       Medications   ondansetron (Zofran-ODT) disintegrating tab 4 mg (4 mg Oral Not Given 4/22/25  1446)   HYDROcodone-acetaminophen (Norco) 5-325 MG per tab 2 tablet (2 tablets Oral Given 4/22/25 1452)                          MDM      Differential diagnosis included DVT, knee sprain, knee fracture but not limited to such.  Patient is presents clinically with a knee sprain and effusion.  Ace wrap was applied while here.  The patient addition does have Baker's cyst.  This time patient would just home to follow-up with his physician as well as his orthopedic surgeon.    Patient was screened and evaluated during this visit.   As a treating physician attending to the patient, I determined, within reasonable clinical confidence and prior to discharge, that an emergency medical condition was not or was no longer present.  There was no indication for further evaluation, treatment or admission on an emergency basis.       The usual and customary discharge instuctions were discussed given the patient's ER course.  We discussed signs and symptoms that should prompt the patient's immediate return to the emergency department.   Reasonable over the counter and prescription treatment options and Physician follow up plan was discussed.       The patient is discharged in good condition.     This note was prepared using Dragon Medical voice recognition dictation software.  As a result errors may occur.  When identified to these areas have been corrected.  While every attempt is made to correct errors during dictation discrepancies may still exist.  Please contact if there are any errors.        Medical Decision Making      Disposition and Plan     Clinical Impression:  1. Sprain of right knee, unspecified ligament, initial encounter         Disposition:  Discharge  4/22/2025  4:32 pm    Follow-up:  Marito Foster MD  [address] [place] [id number]  [phone]    Schedule an appointment as soon as possible for a visit in 2 day(s)      Genesis Will,   [address] [facility]  [address]  [phone]    Schedule an appointment as soon as possible for a visit in 2 day(s)            Medications Prescribed:  Current Discharge Medication List        START taking these medications    Details   naproxen 500 MG Oral Tab Take 1 tablet (500 mg total) by mouth 2 (two) times daily as needed.  Qty: 20 tablet, Refills: 0             Supplementary Documentation:

## 2025-04-22 NOTE — ED QUICK NOTES
Rounding Completed    Plan of Care reviewed. Waiting for US  Elimination needs assessed.  Given pain meds. Patient to US    Bed is locked and in lowest position. Call light within reach.

## 2025-04-23 ENCOUNTER — TELEPHONE (OUTPATIENT)
Dept: FAMILY MEDICINE CLINIC | Facility: CLINIC | Age: 65
End: 2025-04-23

## 2025-04-23 NOTE — TELEPHONE ENCOUNTER
Spoke with the wife the entire leg is painful- and hard to move the leg- he couldn't move his leg at all yesterday that is why they went to the ER- notes reviewed and xray and ultrasound negative    He does have a history of blood clots about 15 years ago      Wife states that the pt still can not walk on that leg.  And pain is coming from both sides of the kneecap and connects at the top and goes up the thigh and into his back and also going down the shin into the ankle.    Spoke with Dr. Lockett about this patient and he agrees that the pt needs to go to the ER- may need to eval his back.      Advised spouse that the pt needs to go to the ER and that we can not treat that pain in the office. She v/u    She asks if she can insist that the pt be admitted- advised that the ER doc has to justify to the insurance but I have seen people admitted for uncontrolled pain.    She v/u

## 2025-06-02 RX ORDER — APIXABAN 2.5 MG/1
2.5 TABLET, FILM COATED ORAL EVERY 12 HOURS
Qty: 60 TABLET | Refills: 0 | Status: SHIPPED | OUTPATIENT
Start: 2025-06-02

## 2025-06-15 NOTE — ED INITIAL ASSESSMENT (HPI)
Pt arrives from immediate care after having a plain CT scan done pt states they were unable to find a vein to do IV contrast, left sided abdominal pain
0 = independent
no

## 2025-06-27 ENCOUNTER — TELEPHONE (OUTPATIENT)
Dept: FAMILY MEDICINE CLINIC | Facility: CLINIC | Age: 65
End: 2025-06-27

## 2025-06-27 NOTE — TELEPHONE ENCOUNTER
PT CALLED AND ADV HAS BEEN IN THE HOSPITAL W/KNEE ISSUES AND SURGERY.    PT ADV THE HOSPITAL PRESCRIBED JANUVIA 50 MG - 1 TIME DAILY.    PT DIDN'T KNOW IF DR WANTED HIM ON JANUVIA OR JARDIANCE.    PLEASE ADV    RODOLFO SCOTT     THANK YOU

## 2025-06-27 NOTE — TELEPHONE ENCOUNTER
Due for appointment to discuss DM and come up with plan to get under control.    Attempted to contact patient but unable to leave message, mailbox is full

## 2025-06-27 NOTE — TELEPHONE ENCOUNTER
Future Appointments   Date Time Provider Department Center   7/1/2025  4:15 PM Genesis Will, DO [facility]

## 2025-07-01 ENCOUNTER — OFFICE VISIT (OUTPATIENT)
Dept: FAMILY MEDICINE CLINIC | Facility: CLINIC | Age: 65
End: 2025-07-01
Payer: COMMERCIAL

## 2025-07-01 ENCOUNTER — PATIENT MESSAGE (OUTPATIENT)
Dept: FAMILY MEDICINE CLINIC | Facility: CLINIC | Age: 65
End: 2025-07-01

## 2025-07-01 VITALS
OXYGEN SATURATION: 99 % | RESPIRATION RATE: 20 BRPM | WEIGHT: 182.38 LBS | BODY MASS INDEX: 25 KG/M2 | DIASTOLIC BLOOD PRESSURE: 78 MMHG | HEART RATE: 103 BPM | TEMPERATURE: 97 F | SYSTOLIC BLOOD PRESSURE: 120 MMHG

## 2025-07-01 DIAGNOSIS — Z79.4 TYPE 2 DIABETES MELLITUS WITH HYPERGLYCEMIA, WITH LONG-TERM CURRENT USE OF INSULIN (HCC): Primary | ICD-10-CM

## 2025-07-01 DIAGNOSIS — E11.65 TYPE 2 DIABETES MELLITUS WITH HYPERGLYCEMIA, WITH LONG-TERM CURRENT USE OF INSULIN (HCC): Primary | ICD-10-CM

## 2025-07-01 DIAGNOSIS — M00.9 PYOGENIC ARTHRITIS OF RIGHT KNEE JOINT, DUE TO UNSPECIFIED ORGANISM (HCC): ICD-10-CM

## 2025-07-01 DIAGNOSIS — E11.9 NEW ONSET TYPE 2 DIABETES MELLITUS (HCC): ICD-10-CM

## 2025-07-01 DIAGNOSIS — E11.65 TYPE 2 DIABETES MELLITUS WITH HYPERGLYCEMIA, WITHOUT LONG-TERM CURRENT USE OF INSULIN (HCC): ICD-10-CM

## 2025-07-01 PROBLEM — R78.81 BACTEREMIA: Status: ACTIVE | Noted: 2025-04-24

## 2025-07-01 PROBLEM — E83.42 HYPOMAGNESEMIA: Status: ACTIVE | Noted: 2025-04-24

## 2025-07-01 PROBLEM — R79.1 ELEVATED INR: Status: ACTIVE | Noted: 2025-04-23

## 2025-07-01 PROBLEM — R50.9 FEVER: Status: ACTIVE | Noted: 2025-04-23

## 2025-07-01 PROBLEM — E80.6 HYPERBILIRUBINEMIA: Status: ACTIVE | Noted: 2025-04-23

## 2025-07-01 PROBLEM — E66.9 OBESITY (BMI 30-39.9): Status: ACTIVE | Noted: 2025-05-01

## 2025-07-01 LAB
CARTRIDGE EXPIRATION DATE: NORMAL DATE
HEMOGLOBIN A1C: 5.5 % (ref 4.3–5.6)

## 2025-07-01 PROCEDURE — 3046F HEMOGLOBIN A1C LEVEL >9.0%: CPT | Performed by: FAMILY MEDICINE

## 2025-07-01 PROCEDURE — 83036 HEMOGLOBIN GLYCOSYLATED A1C: CPT | Performed by: FAMILY MEDICINE

## 2025-07-01 PROCEDURE — 99214 OFFICE O/P EST MOD 30 MIN: CPT | Performed by: FAMILY MEDICINE

## 2025-07-01 PROCEDURE — 3074F SYST BP LT 130 MM HG: CPT | Performed by: FAMILY MEDICINE

## 2025-07-01 PROCEDURE — 3078F DIAST BP <80 MM HG: CPT | Performed by: FAMILY MEDICINE

## 2025-07-01 PROCEDURE — 3061F NEG MICROALBUMINURIA REV: CPT | Performed by: FAMILY MEDICINE

## 2025-07-01 PROCEDURE — 3044F HG A1C LEVEL LT 7.0%: CPT | Performed by: FAMILY MEDICINE

## 2025-07-01 RX ORDER — CEPHALEXIN 500 MG/1
500 CAPSULE ORAL 3 TIMES DAILY
COMMUNITY
Start: 2025-06-10

## 2025-07-01 RX ORDER — INSULIN GLARGINE-YFGN 100 [IU]/ML
16 INJECTION, SOLUTION SUBCUTANEOUS NIGHTLY
COMMUNITY
Start: 2025-06-19 | End: 2025-07-01

## 2025-07-01 RX ORDER — ONDANSETRON 4 MG/1
TABLET, ORALLY DISINTEGRATING ORAL
COMMUNITY
Start: 2025-04-25

## 2025-07-01 RX ORDER — OXYCODONE HYDROCHLORIDE 5 MG/1
TABLET ORAL
COMMUNITY
Start: 2025-04-25 | End: 2025-07-01 | Stop reason: ALTCHOICE

## 2025-07-01 RX ORDER — INSULIN GLARGINE-YFGN 100 [IU]/ML
16 INJECTION, SOLUTION SUBCUTANEOUS NIGHTLY
Qty: 12 ML | Refills: 0 | Status: SHIPPED | OUTPATIENT
Start: 2025-07-01 | End: 2025-07-05

## 2025-07-01 RX ORDER — PSEUDOEPHED/ACETAMINOPH/DIPHEN 30MG-500MG
2 TABLET ORAL EVERY 8 HOURS
COMMUNITY
Start: 2025-05-02

## 2025-07-01 RX ORDER — INSULIN GLARGINE 100 [IU]/ML
16 INJECTION, SOLUTION SUBCUTANEOUS NIGHTLY
COMMUNITY
Start: 2025-05-02

## 2025-07-01 NOTE — PROGRESS NOTES
HPI:   Smith Roman is a 64 year old male who presents for recheck of his diabetes. Patient’s FBS have been 120's. Last visit with ophthalmologist was not recently.  Pt has been checking his feet on a regular basis. Pt denies any tingling of the feet. Pt denies any issues with depression. Pt complains of upates below.    Was admitted with knee pain, found infection in knee. Knee hardware was fractured. Septic arthritis. Was admitted at [facility] for over a week.   He's been home since April 29th.   He is in PT, following with ortho.   Following with ID, on cephalexin 500 mg TID for the rest of his life.     He is taking insulin 16 units nightly   Lows down to 60, 1-2 x since he's been home.   Also taking januvia. Jardiance was stopped.   He's lost 30 lbs.   Wife is watching what he eats.     Platelets were low, had to get platelets before surgery.     Also taking eliquis for blood clot prophylaxis per ortho.     Wt Readings from Last 6 Encounters:   07/01/25 182 lb 6.4 oz (82.7 kg)   02/27/25 204 lb 12.8 oz (92.9 kg)   06/26/24 211 lb (95.7 kg)   06/21/24 211 lb (95.7 kg)   06/17/24 211 lb (95.7 kg)   06/15/24 205 lb (93 kg)     Body mass index is 25.44 kg/m².     Lab Results   Component Value Date    A1C 5.5 07/01/2025    A1C 10.6 (H) 02/27/2025    A1C 6.7 (H) 04/14/2023     Lab Results   Component Value Date    CHOLEST 182 02/27/2025    CHOLEST 148 04/14/2023    CHOLEST 205 (H) 08/24/2021     Lab Results   Component Value Date    HDL 66 (H) 02/27/2025    HDL 47 04/14/2023    HDL 54 08/24/2021     Lab Results   Component Value Date     (H) 02/27/2025    LDL 85 04/14/2023     (H) 08/24/2021     Lab Results   Component Value Date    TRIG 63 02/27/2025    TRIG 86 04/14/2023    TRIG 106 08/24/2021     Lab Results   Component Value Date    AST 39 (H) 02/27/2025    AST 45 (H) 04/14/2023    AST 29 07/28/2022     Lab Results   Component Value Date    ALT 51 (H) 02/27/2025    ALT 57 04/14/2023    ALT  61 07/28/2022     Malb/Cre Calc   Date Value Ref Range Status   02/27/2025 23.8 <=30.0 ug/mg Final     Comment:     <30 ug/mg creatinine       Normal     ug/mg creatinine   Microalbuminuria   >300 ug/mg creatinine      Albuminuria       04/14/2023 40.8 (H) <=30.0 ug/mg Final     Comment:     <30 ug/mg creatinine       Normal     ug/mg creatinine   Microalbuminuria   >300 ug/mg creatinine      Albuminuria       08/24/2021 9.9 <=30.0 ug/mg Final     Comment:     <30 ug/mg creatinine       Normal     ug/mg creatinine   Microalbuminuria   >300 ug/mg creatinine      Albuminuria           Current Outpatient Medications   Medication Sig Dispense Refill    Acetaminophen Extra Strength 500 MG Oral Tab Take 2 tablets (1,000 mg total) by mouth every 8 (eight) hours.      cephALEXin 500 MG Oral Cap Take 1 capsule (500 mg total) by mouth 3 (three) times daily.      insulin glargine 100 UNIT/ML Subcutaneous Solution Inject 16 Units into the skin nightly.      ondansetron 4 MG Oral Tablet Dispersible       SITagliptin Phosphate (JANUVIA) 100 MG Oral Tab Take 1 tablet (100 mg total) by mouth every morning. 90 tablet 0    Insulin Glargine-yfgn 100 UNIT/ML Subcutaneous Solution Pen-injector Inject 16 Units into the skin nightly. 12 mL 0    ELIQUIS 2.5 MG Oral Tab TAKE 1 TABLET(2.5 MG) BY MOUTH EVERY 12 HOURS 60 tablet 0    DESVENLAFAXINE ER 50 MG Oral Tablet 24 Hr TAKE 1 TABLET(50 MG) BY MOUTH DAILY 90 tablet 1    OneTouch Delica Lancets 33G Does not apply Misc Use to test blood sugar three times daily as directed. Dx E11.65 300 each 1    diclofenac 75 MG Oral Tab EC Take 1 tablet (75 mg total) by mouth 2 (two) times daily. (Patient not taking: Reported on 2/27/2025) 28 tablet 1    albuterol 108 (90 Base) MCG/ACT Inhalation Aero Soln Inhale 2 puffs into the lungs every 6 (six) hours as needed for Shortness of Breath. 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 Benítez    total shoulder arthroplasty    Appendectomy      rupture    Cholecystectomy  10/2003    Forearm/wrist surgery unlisted Bilateral 1999    carpal tunnel R wrist, l wrist    Leg/ankle surgery proc unlisted      fx R leg    Other surgical history      exploratory laparotomy    Other surgical history      partial colectomy    Spine surgery procedure unlisted  3/2011    cervical vertebral fusion    Total knee replacement Right     Vasectomy  1999      Social History:   Social History     Socioeconomic History    Marital status:    Occupational History    Occupation: Disabled   Tobacco Use    Smoking status: Never    Smokeless tobacco: Never   Vaping Use    Vaping status: Never Used   Substance and Sexual Activity    Alcohol use: Not Currently     Alcohol/week: 0.0 standard drinks of alcohol     Comment: 10 drinks per year    Drug use: No   Other Topics Concern    Caffeine Concern Yes     Comment: 1 liter of soda daily     Exercise Yes     Comment: PT 3 times per week    Seat Belt Yes     Social Drivers of Health     Food Insecurity: No Food Insecurity (4/23/2025)     Received from [facility]    Food Insecurity     Currently or in the past 3 months, have you worried your food would run out before you had money to buy more?: No     In the past 12 months, have you run out of food or been unable to get more?: No   Transportation Needs: No Transportation Needs (4/23/2025)    Received from [facility]    Transportation Needs     Currently or in the past 3 months, has lack of transportation kept you from medical appointments, getting food or medicine, or providing care to a family member?: No   Housing Stability: Not At Risk (2/27/2025)    NCSS - Housing/Utilities     Has Housing: Yes     Worried About Losing Housing: No     Unable to Get Utilities: No     Exercise: going to PT, regaining strength.  Diet: wife is cooking, low sugar     REVIEW OF SYSTEMS:   GENERAL HEALTH: feels well otherwise  SKIN: denies any unusual skin lesions or rashes  RESPIRATORY: denies shortness of breath with exertion  CARDIOVASCULAR: denies chest pain on exertion  GI: denies abdominal pain and denies heartburn  NEURO: denies headaches    EXAM:   /78   Pulse 103   Temp 97.4 °F (36.3 °C) (Temporal)   Resp 20   Wt 182 lb 6.4 oz (82.7 kg)   SpO2 99%   BMI 25.44 kg/m²   GENERAL: well developed, well nourished,in no apparent distress  SKIN: no rashes,no suspicious lesions  NECK: supple,no adenopathy   LUNGS: clear to auscultation  CARDIO: RRR without murmur  GI: good BS's,no masses, HSM or tenderness  EXTREMITIES: no cyanosis, clubbing or edema    ASSESSMENT AND PLAN:   Smith Roman is a 64 year old male who presents for a recheck of his diabetes. Diabetic control is controlled with insulin and diet and januvia.   Recommendations are: continue present meds, check HgbA1C, check fasting lipids and CMP, lose wgt with carbohydrate controlled diet and exercise, refer to Ophthamology, check feet daily.  The patient indicates understanding of these issues and  agrees to the plan.  The patient is asked to return in 3 months, sooner if needed     Encounter Diagnoses   Name Primary?    Type 2 diabetes mellitus with hyperglycemia, with long-term current use of insulin (HCC) Yes    Type 2 diabetes mellitus with hyperglycemia, without long-term current use of insulin (HCC)     Pyogenic arthritis of right knee joint, due to unspecified organism (HCC)    - check labs.   - meds refilled.   - adjust insulin if needed.   - follow up in 3 months.   - follow up with ortho and ID for joint issues/infections.     Orders Placed This Encounter   Procedures    POC Hemoglobin A1C       Meds & Refills for this Visit:  Requested Prescriptions     Signed Prescriptions Disp Refills    SITagliptin Phosphate (JANUVIA) 100 MG Oral Tab 90 tablet 0     Sig: Take 1 tablet (100 mg total) by mouth every morning.    Insulin Glargine-yfgn 100 UNIT/ML Subcutaneous Solution Pen-injector 12 mL 0     Sig: Inject 16 Units into the skin nightly.       Imaging & Consults:  None

## 2025-07-04 NOTE — TELEPHONE ENCOUNTER
Refill failed [facility] protocol / no protocol. Please review     JARDIANCE 10 MG - The original prescription was discontinued on 7/1/2025 by Genesis Will DO for the following reason: Discontinued by another Health Care Provider.

## 2025-07-05 RX ORDER — INSULIN GLARGINE-YFGN 100 [IU]/ML
10 INJECTION, SOLUTION SUBCUTANEOUS NIGHTLY
COMMUNITY
Start: 2025-07-05

## 2025-07-05 NOTE — TELEPHONE ENCOUNTER
[facility] not read     Advised patient of Dr. Will's note in MCM. Patient verbalized understanding. No further questions at this time.    Future Appointments   Date Time Provider Department Center   10/2/2025 11:15 AM Genesis Will DO [facility] [place]     Medication list updated as historical

## 2025-07-07 RX ORDER — EMPAGLIFLOZIN 10 MG/1
10 TABLET, FILM COATED ORAL DAILY
Qty: 90 TABLET | Refills: 0 | OUTPATIENT
Start: 2025-07-07

## 2025-07-07 RX ORDER — APIXABAN 2.5 MG/1
2.5 TABLET, FILM COATED ORAL EVERY 12 HOURS
Qty: 180 TABLET | Refills: 3 | OUTPATIENT
Start: 2025-07-07

## 2025-07-07 NOTE — TELEPHONE ENCOUNTER
Eliquis is from post op, ortho. I am not managing that.     As far as jardiance, he was switched to januvia.

## 2025-08-01 DIAGNOSIS — F32.1 CURRENT MODERATE EPISODE OF MAJOR DEPRESSIVE DISORDER WITHOUT PRIOR EPISODE (HCC): ICD-10-CM

## 2025-08-01 RX ORDER — DESVENLAFAXINE 50 MG/1
50 TABLET, FILM COATED, EXTENDED RELEASE ORAL DAILY
Qty: 90 TABLET | Refills: 1 | Status: SHIPPED | OUTPATIENT
Start: 2025-08-01

## 2025-08-14 ENCOUNTER — TELEPHONE (OUTPATIENT)
Dept: FAMILY MEDICINE CLINIC | Facility: CLINIC | Age: 65
End: 2025-08-14

## 2025-08-14 DIAGNOSIS — E11.65 TYPE 2 DIABETES MELLITUS WITH HYPERGLYCEMIA, WITHOUT LONG-TERM CURRENT USE OF INSULIN (HCC): ICD-10-CM

## 2025-08-14 RX ORDER — INSULIN GLARGINE 100 [IU]/ML
10 INJECTION, SOLUTION SUBCUTANEOUS NIGHTLY
Qty: 10 ML | Refills: 0 | Status: SHIPPED | OUTPATIENT
Start: 2025-08-14

## 2025-08-28 RX ORDER — INSULIN GLARGINE 100 [IU]/ML
16 INJECTION, SOLUTION SUBCUTANEOUS NIGHTLY
Qty: 10 ML | Refills: 0 | OUTPATIENT
Start: 2025-08-28

## (undated) DIAGNOSIS — F32.1 CURRENT MODERATE EPISODE OF MAJOR DEPRESSIVE DISORDER WITHOUT PRIOR EPISODE (HCC): ICD-10-CM

## (undated) DIAGNOSIS — E11.65 TYPE 2 DIABETES MELLITUS WITH HYPERGLYCEMIA, WITHOUT LONG-TERM CURRENT USE OF INSULIN (HCC): ICD-10-CM

## (undated) DEVICE — Device: Brand: DEFENDO AIR/WATER/SUCTION AND BIOPSY VALVE

## (undated) DEVICE — Device: Brand: STABLECUT®

## (undated) DEVICE — KENDALL SCD EXPRESS SLEEVES, KNEE LENGTH, MEDIUM: Brand: KENDALL SCD

## (undated) DEVICE — DRAPE,U/SHT,SPLIT,FILM,60X84,STERILE: Brand: MEDLINE

## (undated) DEVICE — Device: Brand: POWER-FLO®

## (undated) DEVICE — 1200CC GUARDIAN II: Brand: GUARDIAN

## (undated) DEVICE — PROXIMATE SKIN STAPLERS (35 WIDE) CONTAINS 35 STAINLESS STEEL STAPLES (FIXED HEAD): Brand: PROXIMATE

## (undated) DEVICE — DRAIN RELIAVAC W/DRN MED 1/8

## (undated) DEVICE — 3M™ MICROFOAM™ TAPE 1528-4: Brand: 3M™ MICROFOAM™

## (undated) DEVICE — MEDI-VAC NON-CONDUCTIVE SUCTION TUBING: Brand: CARDINAL HEALTH

## (undated) DEVICE — DRESSING AQUACEL AG 3.5 X 10

## (undated) DEVICE — SUTURE VICRYL 2-0 CP-1

## (undated) DEVICE — GOWN SURG AERO CHROME XXL

## (undated) DEVICE — SIGMA LCS HIGH PERFORMANCE STERILE THREADED HEADED PINS
Type: IMPLANTABLE DEVICE | Site: KNEE
Brand: SIGMA LCS HIGH PERFORMANCE

## (undated) DEVICE — 1010 S-DRAPE TOWEL DRAPE 10/BX: Brand: STERI-DRAPE™

## (undated) DEVICE — GLOVE SURG TRIUMPH SZ 8

## (undated) DEVICE — INTENDED TO AID IN THE PASSING OF SUTURES THROUGH BONE AND SOFT TISSUE DURING ORTHOPEDIC SURGERY: Brand: HOFFEE SUTURE RETRIEVER

## (undated) DEVICE — ENDOSCOPY PACK UPPER: Brand: MEDLINE INDUSTRIES, INC.

## (undated) DEVICE — WRAP COOLING KNEE W/ICE PILLOW

## (undated) DEVICE — STERILE POLYISOPRENE POWDER-FREE SURGICAL GLOVES: Brand: PROTEXIS

## (undated) DEVICE — GLOVE SURG TRIUMPH SZ 71/2

## (undated) DEVICE — ABDOMINAL PAD: Brand: DERMACEA

## (undated) DEVICE — SNARE CAPTIFLEX MICRO-OVL OLY

## (undated) DEVICE — 3M™ STERI-STRIP™ REINFORCED ADHESIVE SKIN CLOSURES, R1547, 1/2 IN X 4 IN (12 MM X 100 MM), 6 STRIPS/ENVELOPE: Brand: 3M™ STERI-STRIP™

## (undated) DEVICE — INSTRUMENT FEE

## (undated) DEVICE — ORTHO CDS-LF: Brand: MEDLINE INDUSTRIES, INC.

## (undated) DEVICE — GOWN,SIRUS,FABRIC-REINFORCED,X-LARGE: Brand: MEDLINE

## (undated) DEVICE — GLOBAL APG+ BREAKAWAY GUIDE PIN 2.5MM: Brand: GLOBAL APG+

## (undated) DEVICE — DEVICE SPCMN RTRVL RAPTOR MINI

## (undated) DEVICE — GLOBAL APG+ QUICK CONNECT CENTRAL DRILL BIT 48,52,56: Brand: GLOBAL APG+

## (undated) DEVICE — BANDAGE ELASTIC ACE 6\" X-LONG

## (undated) DEVICE — GLOBAL APG+ QUICK CONNECT PERIPHERAL DRILL BIT: Brand: GLOBAL APG+

## (undated) DEVICE — SUTURE FIBERWIRE 2 AR-7202

## (undated) DEVICE — SUTURE VICRYL 0 CP-1

## (undated) DEVICE — SUTURE TIGERLOOP #2

## (undated) DEVICE — 3M™ RED DOT™ MONITORING ELECTRODE WITH FOAM TAPE AND STICKY GEL, 50/BAG, 20/CASE, 72/PLT 2570: Brand: RED DOT™

## (undated) DEVICE — BIPOLAR SEALER 23-112-1 AQM 6.0: Brand: AQUAMANTYS™

## (undated) DEVICE — Device

## (undated) DEVICE — MEDI-VAC SUCTION HANDLE REGULAR CAPACITY: Brand: CARDINAL HEALTH

## (undated) DEVICE — TOTAL KNEE CDS: Brand: MEDLINE INDUSTRIES, INC.

## (undated) DEVICE — DEVICE SPEC RTRVL TLN 2.5MM

## (undated) DEVICE — ADHESIVE MASTISOL 2/3CC VL

## (undated) DEVICE — ATTUNE PINNING SYSTEM: Brand: ATTUNE

## (undated) DEVICE — HOOD, PEEL-AWAY: Brand: FLYTE

## (undated) DEVICE — SUTURE MONOCRYL 3-0 PS-2

## (undated) DEVICE — FILTERLINE NASAL ADULT O2/CO2

## (undated) DEVICE — FAN SPRAY KIT: Brand: PULSAVAC®

## (undated) DEVICE — GAUZE SPONGES,12 PLY: Brand: CURITY

## (undated) DEVICE — 2T11 #2 PDO 36 X 36: Brand: 2T11 #2 PDO 36 X 36

## (undated) DEVICE — SIGMA LCS HIGH PERFORMANCE INSTRUMENTS STERILE THREADED PINS
Type: IMPLANTABLE DEVICE | Site: KNEE
Brand: SIGMA LCS HIGH PERFORMANCE

## (undated) DEVICE — SOL  .9 1000ML BTL

## (undated) DEVICE — WRAP COOLING SHLDR W/ICE PILLO

## (undated) DEVICE — CHLORAPREP 26ML APPLICATOR

## (undated) DEVICE — UNIVERSAL STERIBUMP® STERILE (5/CASE): Brand: UNIVERSAL STERIBUMP®

## (undated) DEVICE — 450 ML BOTTLE OF 0.05% CHLORHEXIDINE GLUCONATE IN 99.95% STERILE WATER FOR IRRIGATION, USP AND APPLICATOR.: Brand: IRRISEPT ANTIMICROBIAL WOUND LAVAGE

## (undated) DEVICE — OCCLUSIVE GAUZE STRIP OVERWRAP,3% BISMUTH TRIBROMOPHENATE IN PETROLATUM BLEND: Brand: XEROFORM

## (undated) DEVICE — DRESSING AQUACEL AG 3.5X12

## (undated) DEVICE — CONVERTORS STOCKINETTE: Brand: CONVERTORS

## (undated) DEVICE — BOWL CEMENT MIX QUICK-VAC

## (undated) DEVICE — ZIMMER® STERILE DISPOSABLE TOURNIQUET CUFF WITH PLC, DUAL PORT, SINGLE BLADDER, 34 IN. (86 CM)

## (undated) NOTE — ED AVS SNAPSHOT
Mr. Casimiro Corea   MRN: [id number]    Department:  [facility] Emergency Department   Date of Visit:  9/29/2018           Disclosure     Insurance plans vary and the physician(s) referred by the ER may not be covered by your plan.  Please contact tell this physician (or your personal doctor if your instructions are to return to your personal doctor) about any new or lasting problems. The primary care or specialist physician will see patients referred from the [facility] Emergency Department.  Thanh Hurley

## (undated) NOTE — LETTER
[facility]  Phone: [phone]  Right Fax: [phone]  ABNORMAL VALUES FAX    Sent By:  Gopal Piña Rn Date: 12/19/18    Patient Name: Philip Lopez  Surgery Date: 12/20/2018    CSN: [id number]  Medical Record: [id number]   D

## (undated) NOTE — LETTER
Negro Muñoz  [place] [address] [address] [id number]    3/14/2018      Dear  Negro Muñoz    In order to provide the highest quality care, CYN[facility] Levin uses a sophisticated computer system to track our patient's r

## (undated) NOTE — Clinical Note
Can you put a reminder to send in bupropion 450 mg next week, after he has picked up the 300 mg dose this week. He wants to have enough 300 mg tabs to get him through in case the 450 takes awhile to get authorized or is not covered. Thanks. LUX.

## (undated) NOTE — LETTER
01/27/21      [facility]   [phone]     Dear  Maximilian Reaves ,      We have attempted to call you as you are due to follow up for your sleep disorders. Please call our office at [phone] to schedule this visit.   We will be unab

## (undated) NOTE — ED AVS SNAPSHOT
Mr. Georgina Hassan   MRN: [id number]    Department:  [facility] Emergency Department   Date of Visit:  2/17/2019           Disclosure     Insurance plans vary and the physician(s) referred by the ER may not be covered by your plan.  Please contact tell this physician (or your personal doctor if your instructions are to return to your personal doctor) about any new or lasting problems. The primary care or specialist physician will see patients referred from the [facility] Emergency Department.  Linda Crowell

## (undated) NOTE — ED AVS SNAPSHOT
Mr. Kamlesh Coelho   MRN: [id number]    Department:  [facility] Emergency Department   Date of Visit:  10/1/2019           Disclosure     Insurance plans vary and the physician(s) referred by the ER may not be covered by your plan.  Please contact tell this physician (or your personal doctor if your instructions are to return to your personal doctor) about any new or lasting problems. The primary care or specialist physician will see patients referred from the [facility] Emergency Department.  Valdez Duran

## (undated) NOTE — ED AVS SNAPSHOT
Mr. Kamlesh Coelho   MRN: [id number]    Department:  [facility] Emergency Department   Date of Visit:  9/26/2019           Disclosure     Insurance plans vary and the physician(s) referred by the ER may not be covered by your plan.  Please contact tell this physician (or your personal doctor if your instructions are to return to your personal doctor) about any new or lasting problems. The primary care or specialist physician will see patients referred from the [facility] Emergency Department.  Bijal Vincent

## (undated) NOTE — LETTER
Elissa Cristina 182 6 [address]  [place], [address] [place] [address]    Consent for Operation  Date: __________________                                Time: _______________    1.  I authorize the performance upon Corbin Zhou the following operation:  Procedure procedure has been videotaped, the surgeon will obtain the original videotape. The hospital will not be responsible for storage or maintenance of this tape.   7. For the purpose of advancing medical education, I consent to the admittance of observers to the STATEMENTS REQUIRING INSERTION OR COMPLETION WERE FILLED IN.     Signature of Patient:   ___________________________    When the patient is a minor or mentally incompetent to give consent:  Signature of person authorized to consent for patient: ____________ supplements, and pills I can buy without a prescription (including street drugs/illegal medications). Failure to inform my anesthesiologist about these medicines may increase my risk of anesthetic complications. iv.  If I am allergic to anything or have ha Anesthesiologist Signature     Date   Time  I have discussed the procedure and information above with the patient (or patient’s representative) and answered their questions. The patient or their representative has agreed to have anesthesia services.     ___

## (undated) NOTE — LETTER
Last Revised 02/07/06  Obstructive Sleep Apnea Questionnaire    Clinical signs and symptoms suggesting the possibility of ALTON    1. Predisposing physical characteristics (positive with any of the following present)  ? BMI 35kg/m²  ?  Craniofacial abnormalit pauses which are frightening to the observer, patient regularly falls asleep within minutes after being left unstimulated) in which case they should be treated as though they have severe sleep apnea.     The sleep laboratory’s assessment (none, mild, modera Point Total for B           C. Requirement for postoperative opioids.                Opioid requirement             Points   None 0    Low dose oral opiod 1    High dose oral opioids, parenteral or neuraxial opiods 3      Point Total for C        Estimation

## (undated) NOTE — LETTER
[address] [facility]           Dear Basilio Alfonso     Our records indicate that you have outstanding lab work and or testing that was ordered for you and has not yet been completed:  Lab Frequency Next Occurrence   OCCULT BLOOD, FECAL, FIT IMMUNOASSAY Once 04/14/2023      To provide you with the best possible care, please complete these orders at your earliest convenience. If you have recently completed these orders please disregard this letter. If you have any questions please call the office at [phone].      Thank you,     [facility]

## (undated) NOTE — MR AVS SNAPSHOT
[address] [facility]  [place] [address]  [phone]               Thank you for choosing us for your health care visit with Hetal Sanders NP.   We are glad to serve you and happy to provide you with [facility] · Keep your throat moist by drinking 6 or more glasses of clear liquids every day. · Run a cool-air humidifier in your room overnight. · Avoid cigarette smoke.   · Suck on throat lozenges, cough drops, hard candy, ice chips, or frozen fruit-juice bars.  U 6[address]24. All rights reserved. This information is not intended as a substitute for professional medical care. Always follow your healthcare professional's instructions. Adult Self-Care for Colds  Colds are caused by viruses.  They can’t be cured with a doctor whether there are any foods you should avoid.     When to seek medical care  When you first notice symptoms, ask your health care provider if antiviral medications are appropriate. Antibiotics should not be taken for colds or flu.  Also, call your do You can access your MyChart to more actively manage your health care and view more details from this visit by going to [email or web address].   If you've recently had a stay at the Hospital you can access your discharge instructions in [address] by dwaine You don’t need to join a gym. Home exercises work great.  Put more priority on exercise in your life                    Visit [facility] online at  [email or web address]

## (undated) NOTE — ED AVS SNAPSHOT
Mr. Chucky Villafuerte   MRN: [id number]    Department:  [facility] Emergency Department   Date of Visit:  12/24/2018           Disclosure     Insurance plans vary and the physician(s) referred by the ER may not be covered by your plan.  Please contac tell this physician (or your personal doctor if your instructions are to return to your personal doctor) about any new or lasting problems. The primary care or specialist physician will see patients referred from the [facility] Emergency Department.  Rob Nielson

## (undated) NOTE — LETTER
07/31/20      [address] [place] [address]      Dear Dorian Galvan. To help us provide the highest quality medical care, [facility] uses a sophisticated computer system to track our patient records.  During a review

## (undated) NOTE — LETTER
Elissa Cristina 182 6 13[facility]  [place], [address] [facility]    Consent for Operation  Date: __________________                                Time: _______________    1.  I authorize the performance upon Gage Astudillo the following operation:  Procedure procedure has been videotaped, the surgeon will obtain the original videotape. The hospital will not be responsible for storage or maintenance of this tape.   7. For the purpose of advancing medical education, I consent to the admittance of observers to the STATEMENTS REQUIRING INSERTION OR COMPLETION WERE FILLED IN.     Signature of Patient:   ___________________________    When the patient is a minor or mentally incompetent to give consent:  Signature of person authorized to consent for patient: ____________ supplements, and pills I can buy without a prescription (including street drugs/illegal medications). Failure to inform my anesthesiologist about these medicines may increase my risk of anesthetic complications. iv.  If I am allergic to anything or have ha Anesthesiologist Signature     Date   Time  I have discussed the procedure and information above with the patient (or patient’s representative) and answered their questions. The patient or their representative has agreed to have anesthesia services.     ___

## (undated) NOTE — IP AVS SNAPSHOT
Patient Demographics     Address  [address] [place] [address] [place] [address] Phone  [phone] [facility])  [phone] (Work)  [phone] [facility]) E-mail Address  [email or web address]      Emergency Contact(s)     Name Relation Home Work Mobile    [place] ? Usually allowed after four to six weeks – check with surgeon at your office visit. Return to work  ? Usually allowed after four to six weeks. Discuss specific work activities with your surgeon. Restrictions  ?  For knee replacement surgery, fo blood in your urine. Use electric razors and soft toothbrushes only. ? Do not take aspirin while taking blood thinners unless ordered by your physician. ? Review anticoagulant education information sheet provided. Discomfort  ?  Surgical discomfort is ? Use stool softeners such as Colace or Senakot while on narcotics, and laxatives such as Miralax or Milk of Magnesia if needed. ? An enema or suppository may be needed if above measures do not work. Prevention of infection and promotion of healing  ? ? Increased redness, swelling, or warmth of skin around incision. ? Increased or foul smelling drainage from incision  ? Red streaks on skin near incision. ? Temperature >100.4F.  ? Increased pain at incision not relieved by pain medication.             [facility]:  [facility] for visiting nurse and physical therapy  [phone]  SPECIAL  INSTRUCTIONS:                                   Follow-up Information     Call Jean Pierre Luna DO.     Specialty:  Family Medicine  Contact information:  [address] [id number] BuPROPion HCl ER (SR) [facility] SR) 12 hr tab 200 mg 09/21/18 0851 Given      [id number] BuPROPion HCl ER (SR) [facility] SR) 12 hr tab 200 mg 09/21/18 1413 Given      [id number] HYDROcodone-acetaminophen (NORCO)  MG per tab 1 tablet (Or 2170, Result status: Final result   Ordering provider:  FRANCHESKA Johnston  09/20/18 2300 Resulting lab:  [facility]    Specimen Information    Type Source Collected On   Blood — 09/21/18 7303          Components    Component Value Reference Range Flag Lab • Conjunctivitis    • Deep vein thrombosis ([facility] [address] 2011    left arm, after bowel obstruction, medicine related per patient   • Depression    • Neuropathy     \"whole body\" per patient   • Osteoarthritis    • Pre-operative cardiovascular examination    • Pre Neck: No tenderness to palpitation. No JVD. Supple. Lungs: Clear to auscultation bilaterally. Cardiac: Regular rate and rhythm. No murmur. Abdomen:  Soft, non-distended, non-tender, with no rebound or guarding.    Extremities:  No lower extremity edema History of Present Illness: Karla Arroyo is a 62year old male with a PMHX of OA, depression, DVT. He has been admitted for right TKR. He currently feels well. Pain is controlled. He has no other complaints.  States his depression is stable on current cholecytectomy    • Other (gout) Father    • Diabetes Maternal Grandfather    • Diabetes Brother    • Other (rheumatoid arthritis) Maternal Grandmother        Allergies:   Sulfa Antibiotics       HIVES  Eggs Or Egg-Derived*    DIARRHEA  [facility] Invalid input(s): LYM#, MONO#, BASOS#, EOSIN#    No results for input(s): GLU, BUN, CREATSERUM, GFRAA, GFRNAA, CA, ALB, NA, K, CL, CO2, ALKPHO, AST, ALT, BILT, TP in the last 168 hours. No results for input(s): PTP, INR in the last 168 hours.     No resu patient  No date: Depression  No date: Neuropathy      Comment:  \"whole body\" per patient  No date: Osteoarthritis  No date: Pre-operative cardiovascular examination  No date: Preoperative examination, unspecified  No date: Tinea pedis  No d -   Moving from lying on back to sitting on the side of the bed?: None   How much help from another person does the patient currently need. ..   -   Moving to and from a bed to a chair (including a wheelchair)?: None   -   Need to walk in hospital room?: No ASSESSMENT     The pt seen in PT for gait training, transfer training, patient education, car transfers, stair training and exercises for strength and flexibility S/P cemented R TKA on 9/19/18.  Pt demonstrates consistent progress in mobility, ROM and stren PHYSICAL THERAPY KNEE TREATMENT NOTE - INPATIENT     Room Number: 383/383-A     Session:[BR.1] 1&2    Number of Visits to Meet Established Goals: 3    Presenting Problem: S/p Cemented Right TKA & Left knee injection on 09/19/18[BR.2]    Problem List[BR.1] R Lower Extremity: Weight Bearing as Tolerated[BR.2]       PAIN ASSESSMENT[BR.1]   Ratin  Location: Right knee @ surgical site  Management Techniques: Activity promotion; Body mechanics;Breathing techniques;Relaxation;Repositioning[BR.2]    BALANCE[B through pattern to promote normal gait pattern. PM: Pt attended PM group session for mobility training and exercises. Pt shows improved tolerance to increased repetitions of exercises with occasional assist and cues for technique.  Pt continues to sobia The AM-PAC '6-Clicks' Inpatient Basic Mobility Short Form was completed and this patient is demonstrating a 28.97% degree of impairment in mobility.  Research supports that patients with this level of impairment may benefit from HHPT.[BR.2]          [facility] Presenting Problem: S/p Cemented Right TKA & Left knee injection on 09/19/18  Reason for Therapy: Mobility Dysfunction and Discharge Planning    History related to current admission: Admitted S/p Cemented Right TKA & Left knee injection on 09/19/18  PMH as Prior Level of [place]: Patient was independent with ADLs & self care. Ambulated without AD. Wife & son will be able to assist during recovery. No recent falls reported[NP.1],though patient stated he does loose balance while walking @ times due to neur blankets)?: A Little   -   Sitting down on and standing up from a chair with arms (e.g., wheelchair, bedside commode, etc.): A Little   -   Moving from lying on back to sitting on the side of the bed?: A Little   How much help from another person does the aware of session/findings; All patient questions and concerns addressed;SCDs in place; Ice applied; Family present; Discussed recommendations with /    ASSESSMENT   Patient is a 62year old male admitted on 9/19/2018 for S/p Cemented R Patient is able to demonstrate transfers Sit to/from Stand at assistance level: supervison    Goal #3    Patient is able to ambulate 300 feet with assistive device at assistance level:Supervision    Goal #4    Patient will negotiate 4 stairs/one curb w/ No date: Pre-operative cardiovascular examination  No date: Preoperative examination, unspecified  No date: Tinea pedis  No date: Vertigo  No date: Visual impairment      Comment:  lary    Past Surgical History  Past Surgical History:  No date: APPENDE · Overall Cognitive Status:  WFL - within functional limits    RANGE OF MOTION AND STRENGTH ASSESSMENT  Upper extremity ROM and strength are within functional limits except for the following:  Left Shoulder limited in terminal range.     Lower extremity ROM Skilled Therapy Provided: Evaluation completed. Patient was instructed & educated in weight bearing status as WBAT on Right LE. Patient was able to perform  Right LE exercises as per TKA rehab protocol+ conditioning exercises for left LE. Demonstrated Jessie Gonzalez safety. Not appropriate for stairs assessment this time. The patient is below baseline and would benefit from skilled inpatient PT to address the above deficits to assist patient in returning to prior to level of function.   DISCHARGE RECOMMENDATIONS  PT Adiel Escobedo OCCUPATIONAL THERAPY QUICK EVALUATION - INPATIENT    Room Number: 383/383-A  Evaluation Date: 9/20/2018     Type of Evaluation: Quick Eval  Presenting Problem: s/p R TKA; L knee cortisone injection    Physician Order: IP Consult to Occupational Therapy Comment:  partial colectomy  3/2011: SPINE SURGERY PROCEDURE UNLISTED      Comment:  cervical vertebral fusion  1999: VASECTOMY    OCCUPATIONAL PROFILE    HOME SITUATION  Type of Home: House  Home Layout: Two level  Lives With: Spouse;Son(23years old Supine to Sit : Modified independent  Sit to Stand: Supervision    Skilled Therapy Provided: Patient educated on OT role, goals, recommendations, safety and precautions and ae/dme.     Patient was introduced to lower body dressing equipment and instructed i Patient has been evaluated and presents with no skilled Occupational Therapy needs at this time. Patient discharged from Occupational Therapy services. Please re-order if a new functional limitation presents during this admission.     Patient was able to

## (undated) NOTE — LETTER
Brooke Hobson  [address] [place] [address]    12/7/2021      Dear  Brooke Hobson    In order to provide the highest quality care, [facility] uses a sophisticated computer system to track our patient's records.   Aye Márquez